# Patient Record
Sex: FEMALE | Employment: FULL TIME | URBAN - METROPOLITAN AREA
[De-identification: names, ages, dates, MRNs, and addresses within clinical notes are randomized per-mention and may not be internally consistent; named-entity substitution may affect disease eponyms.]

---

## 2017-02-28 ENCOUNTER — HOSPITAL ENCOUNTER (OUTPATIENT)
Dept: MAMMOGRAPHY | Age: 57
Discharge: HOME | End: 2017-02-28
Attending: FAMILY MEDICINE
Payer: COMMERCIAL

## 2017-02-28 DIAGNOSIS — Z91.89 AT RISK FOR OSTEOPOROSIS: ICD-10-CM

## 2017-02-28 PROCEDURE — 77080 DXA BONE DENSITY AXIAL: CPT

## 2017-03-08 ENCOUNTER — HOSPITAL ENCOUNTER (EMERGENCY)
Age: 57
Discharge: HOME | End: 2017-03-09
Attending: EMERGENCY MEDICINE
Payer: COMMERCIAL

## 2017-03-08 ENCOUNTER — APPOINTMENT (OUTPATIENT)
Dept: CT IMAGING | Age: 57
End: 2017-03-08
Attending: EMERGENCY MEDICINE
Payer: COMMERCIAL

## 2017-03-08 DIAGNOSIS — R51.9 HEADACHE, UNSPECIFIED HEADACHE TYPE: Primary | ICD-10-CM

## 2017-03-08 LAB
ALBUMIN SERPL BCP-MCNC: 3.6 G/DL (ref 3.5–5)
ALBUMIN/GLOB SERPL: 1 {RATIO} (ref 1.1–2.2)
ALP SERPL-CCNC: 63 U/L (ref 45–117)
ALT SERPL-CCNC: 29 U/L (ref 12–78)
ANION GAP BLD CALC-SCNC: 8 MMOL/L (ref 5–15)
AST SERPL W P-5'-P-CCNC: 19 U/L (ref 15–37)
ATRIAL RATE: 61 BPM
BASOPHILS # BLD AUTO: 0 K/UL (ref 0–0.1)
BASOPHILS # BLD: 1 % (ref 0–1)
BILIRUB SERPL-MCNC: 0.4 MG/DL (ref 0.2–1)
BUN SERPL-MCNC: 13 MG/DL (ref 6–20)
BUN/CREAT SERPL: 19 (ref 12–20)
CALCIUM SERPL-MCNC: 8.3 MG/DL (ref 8.5–10.1)
CALCULATED P AXIS, ECG09: 31 DEGREES
CALCULATED R AXIS, ECG10: 14 DEGREES
CALCULATED T AXIS, ECG11: 31 DEGREES
CHLORIDE SERPL-SCNC: 106 MMOL/L (ref 97–108)
CK SERPL-CCNC: 103 U/L (ref 26–192)
CO2 SERPL-SCNC: 27 MMOL/L (ref 21–32)
CREAT SERPL-MCNC: 0.7 MG/DL (ref 0.55–1.02)
DIAGNOSIS, 93000: NORMAL
EOSINOPHIL # BLD: 0 K/UL (ref 0–0.4)
EOSINOPHIL NFR BLD: 0 % (ref 0–7)
ERYTHROCYTE [DISTWIDTH] IN BLOOD BY AUTOMATED COUNT: 14 % (ref 11.5–14.5)
GLOBULIN SER CALC-MCNC: 3.6 G/DL (ref 2–4)
GLUCOSE SERPL-MCNC: 78 MG/DL (ref 65–100)
HCT VFR BLD AUTO: 36.3 % (ref 35–47)
HGB BLD-MCNC: 12.1 G/DL (ref 11.5–16)
LYMPHOCYTES # BLD AUTO: 24 % (ref 12–49)
LYMPHOCYTES # BLD: 0.9 K/UL (ref 0.8–3.5)
MCH RBC QN AUTO: 28.6 PG (ref 26–34)
MCHC RBC AUTO-ENTMCNC: 33.3 G/DL (ref 30–36.5)
MCV RBC AUTO: 85.8 FL (ref 80–99)
MONOCYTES # BLD: 0.8 K/UL (ref 0–1)
MONOCYTES NFR BLD AUTO: 20 % (ref 5–13)
NEUTS SEG # BLD: 2.2 K/UL (ref 1.8–8)
NEUTS SEG NFR BLD AUTO: 55 % (ref 32–75)
P-R INTERVAL, ECG05: 138 MS
PLATELET # BLD AUTO: 233 K/UL (ref 150–400)
POTASSIUM SERPL-SCNC: 4.6 MMOL/L (ref 3.5–5.1)
PROT SERPL-MCNC: 7.2 G/DL (ref 6.4–8.2)
Q-T INTERVAL, ECG07: 418 MS
QRS DURATION, ECG06: 80 MS
QTC CALCULATION (BEZET), ECG08: 420 MS
RBC # BLD AUTO: 4.23 M/UL (ref 3.8–5.2)
SODIUM SERPL-SCNC: 141 MMOL/L (ref 136–145)
TROPONIN I SERPL-MCNC: <0.04 NG/ML
VENTRICULAR RATE, ECG03: 61 BPM
WBC # BLD AUTO: 4 K/UL (ref 3.6–11)

## 2017-03-08 PROCEDURE — 77030003666 HC NDL SPINAL BD -A

## 2017-03-08 PROCEDURE — 84157 ASSAY OF PROTEIN OTHER: CPT | Performed by: EMERGENCY MEDICINE

## 2017-03-08 PROCEDURE — 75810000133 HC LUMBAR PUNCTURE

## 2017-03-08 PROCEDURE — 87205 SMEAR GRAM STAIN: CPT | Performed by: EMERGENCY MEDICINE

## 2017-03-08 PROCEDURE — 80053 COMPREHEN METABOLIC PANEL: CPT | Performed by: EMERGENCY MEDICINE

## 2017-03-08 PROCEDURE — 96361 HYDRATE IV INFUSION ADD-ON: CPT

## 2017-03-08 PROCEDURE — 87186 SC STD MICRODIL/AGAR DIL: CPT | Performed by: EMERGENCY MEDICINE

## 2017-03-08 PROCEDURE — 82945 GLUCOSE OTHER FLUID: CPT | Performed by: EMERGENCY MEDICINE

## 2017-03-08 PROCEDURE — 74011250637 HC RX REV CODE- 250/637: Performed by: EMERGENCY MEDICINE

## 2017-03-08 PROCEDURE — 70450 CT HEAD/BRAIN W/O DYE: CPT

## 2017-03-08 PROCEDURE — 82550 ASSAY OF CK (CPK): CPT | Performed by: EMERGENCY MEDICINE

## 2017-03-08 PROCEDURE — 87077 CULTURE AEROBIC IDENTIFY: CPT | Performed by: EMERGENCY MEDICINE

## 2017-03-08 PROCEDURE — 84484 ASSAY OF TROPONIN QUANT: CPT | Performed by: EMERGENCY MEDICINE

## 2017-03-08 PROCEDURE — 77030014143 HC TY PUNC LUMBR BD -A

## 2017-03-08 PROCEDURE — 93005 ELECTROCARDIOGRAM TRACING: CPT

## 2017-03-08 PROCEDURE — 36415 COLL VENOUS BLD VENIPUNCTURE: CPT | Performed by: EMERGENCY MEDICINE

## 2017-03-08 PROCEDURE — 70496 CT ANGIOGRAPHY HEAD: CPT

## 2017-03-08 PROCEDURE — 99285 EMERGENCY DEPT VISIT HI MDM: CPT

## 2017-03-08 PROCEDURE — 74011636320 HC RX REV CODE- 636/320: Performed by: EMERGENCY MEDICINE

## 2017-03-08 PROCEDURE — 96374 THER/PROPH/DIAG INJ IV PUSH: CPT

## 2017-03-08 PROCEDURE — 74011250636 HC RX REV CODE- 250/636: Performed by: EMERGENCY MEDICINE

## 2017-03-08 PROCEDURE — 96375 TX/PRO/DX INJ NEW DRUG ADDON: CPT

## 2017-03-08 PROCEDURE — 89050 BODY FLUID CELL COUNT: CPT | Performed by: EMERGENCY MEDICINE

## 2017-03-08 PROCEDURE — 85025 COMPLETE CBC W/AUTO DIFF WBC: CPT | Performed by: EMERGENCY MEDICINE

## 2017-03-08 RX ORDER — BUTALBITAL, ACETAMINOPHEN AND CAFFEINE 300; 40; 50 MG/1; MG/1; MG/1
1 CAPSULE ORAL
Qty: 20 CAP | Refills: 0 | Status: SHIPPED | OUTPATIENT
Start: 2017-03-08 | End: 2017-03-13

## 2017-03-08 RX ORDER — TRAMADOL HYDROCHLORIDE 50 MG/1
50 TABLET ORAL
Qty: 20 TAB | Refills: 0 | Status: ON HOLD | OUTPATIENT
Start: 2017-03-08 | End: 2017-03-12

## 2017-03-08 RX ORDER — KETOROLAC TROMETHAMINE 30 MG/ML
15 INJECTION, SOLUTION INTRAMUSCULAR; INTRAVENOUS
Status: COMPLETED | OUTPATIENT
Start: 2017-03-08 | End: 2017-03-08

## 2017-03-08 RX ORDER — SODIUM CHLORIDE 0.9 % (FLUSH) 0.9 %
10 SYRINGE (ML) INJECTION
Status: COMPLETED | OUTPATIENT
Start: 2017-03-08 | End: 2017-03-08

## 2017-03-08 RX ORDER — MORPHINE SULFATE 2 MG/ML
4 INJECTION, SOLUTION INTRAMUSCULAR; INTRAVENOUS
Status: COMPLETED | OUTPATIENT
Start: 2017-03-08 | End: 2017-03-08

## 2017-03-08 RX ORDER — ONDANSETRON 2 MG/ML
4 INJECTION INTRAMUSCULAR; INTRAVENOUS
Status: COMPLETED | OUTPATIENT
Start: 2017-03-08 | End: 2017-03-08

## 2017-03-08 RX ORDER — SODIUM CHLORIDE 9 MG/ML
50 INJECTION, SOLUTION INTRAVENOUS
Status: COMPLETED | OUTPATIENT
Start: 2017-03-08 | End: 2017-03-09

## 2017-03-08 RX ORDER — DEXAMETHASONE SODIUM PHOSPHATE 4 MG/ML
10 INJECTION, SOLUTION INTRA-ARTICULAR; INTRALESIONAL; INTRAMUSCULAR; INTRAVENOUS; SOFT TISSUE
Status: COMPLETED | OUTPATIENT
Start: 2017-03-08 | End: 2017-03-08

## 2017-03-08 RX ORDER — BUTALBITAL, ACETAMINOPHEN AND CAFFEINE 50; 325; 40 MG/1; MG/1; MG/1
2 TABLET ORAL
Status: COMPLETED | OUTPATIENT
Start: 2017-03-08 | End: 2017-03-08

## 2017-03-08 RX ADMIN — ONDANSETRON HYDROCHLORIDE 4 MG: 2 INJECTION, SOLUTION INTRAMUSCULAR; INTRAVENOUS at 18:25

## 2017-03-08 RX ADMIN — Medication 10 ML: at 21:55

## 2017-03-08 RX ADMIN — BUTALBITAL, ACETAMINOPHEN AND CAFFEINE 2 TABLET: 50; 325; 40 TABLET ORAL at 20:41

## 2017-03-08 RX ADMIN — KETOROLAC TROMETHAMINE 15 MG: 30 INJECTION, SOLUTION INTRAMUSCULAR at 18:26

## 2017-03-08 RX ADMIN — Medication 4 MG: at 21:27

## 2017-03-08 RX ADMIN — SODIUM CHLORIDE 1000 ML: 900 INJECTION, SOLUTION INTRAVENOUS at 18:23

## 2017-03-08 RX ADMIN — DEXAMETHASONE SODIUM PHOSPHATE 10 MG: 4 INJECTION, SOLUTION INTRAMUSCULAR; INTRAVENOUS at 18:28

## 2017-03-08 RX ADMIN — IOPAMIDOL 100 ML: 755 INJECTION, SOLUTION INTRAVENOUS at 21:55

## 2017-03-08 RX ADMIN — SODIUM CHLORIDE 50 ML/HR: 900 INJECTION, SOLUTION INTRAVENOUS at 21:55

## 2017-03-08 NOTE — ED NOTES
States she became dizzy yesterday after leaving work. Saw MD at place of work today and was referred to ED for further eval.  States that when she turns her head to the left she becomes very dizzy and when she turns her head to the right she becomes nauseated. Also complaining of bilateral temporal headache that is extending down into her jaw.

## 2017-03-08 NOTE — ED PROVIDER NOTES
HPI Comments: Savannah Sarmiento is a 49 y.o. female with PMHx of hypothyroid disease, presenting ambulatory to ED c/o intermittent dizziness for the past two days. Pt reports dizziness is worse with ambulation. She notes associated constant 7/10 throbbing diffuse HA, worse since last night. Pt states the HA is worse with moving her neck from side to side, unrelieved with taking Advil this morning. She also c/o diffuse neck pain, nausea, and photophobia secondary to HA. Pt notes she can hear her heartbeat. She reports evaluation for symptoms yesterday by a [facility] physician during which she had positive orthostatic test; pt notes she had other tests completed but does not provide specifics. Pt denies history of migraines and HA. She denies other PMHx. Pt specifically denies fever, CP, SOB, vomiting, vision changes, and numbness/tingling anywhere. PCP: Demetrius Rubin MD  Social Hx: never smoker; + EtOH (socially); There are no other complaints, changes, or physical findings at this time. Written by DEBRA Saenz, as dictated by Cisco Lee DO. The history is provided by the patient. Past Medical History:   Diagnosis Date    Thyroid disease     hypothyroid       Past Surgical History:   Procedure Laterality Date    HX ORTHOPAEDIC  2002    ACL repair, pt unsure which side    HX ORTHOPAEDIC  2013    left shoulder surgery    TX COLONOSCOPY FLX DX W/COLLJ SPEC WHEN PFRMD  5/6/2013              No family history on file. Social History     Social History    Marital status:      Spouse name: N/A    Number of children: N/A    Years of education: N/A     Occupational History    Not on file.      Social History Main Topics    Smoking status: Never Smoker    Smokeless tobacco: Not on file    Alcohol use Yes      Comment: socially    Drug use: Not on file    Sexual activity: Not on file     Other Topics Concern    Not on file     Social History Narrative ALLERGIES: Percocet [oxycodone-acetaminophen]    Review of Systems   Constitutional: Negative for fatigue and fever. Eyes: Positive for photophobia. Negative for visual disturbance. Respiratory: Negative for shortness of breath and wheezing. Cardiovascular: Negative for chest pain and leg swelling. Gastrointestinal: Positive for nausea. Negative for blood in stool, constipation, diarrhea and vomiting. Endocrine: Negative. Genitourinary: Negative for difficulty urinating and dysuria. Musculoskeletal: Positive for neck pain (diffuse). Skin: Negative for rash. Allergic/Immunologic: Negative. Neurological: Positive for dizziness and headaches (diffuse). Negative for weakness and numbness (or tingling). Hematological: Negative. Psychiatric/Behavioral: Negative. All other systems reviewed and are negative. Vitals:    03/08/17 1900 03/08/17 2042 03/08/17 2044 03/08/17 2046   BP: 154/79 149/68 143/85 137/82   Pulse:  79 81 83   Resp:       Temp:       SpO2: 97%      Weight:       Height:                Physical Exam   Constitutional: She is oriented to person, place, and time. She appears well-developed and well-nourished. HENT:   Head: Normocephalic and atraumatic. Mouth/Throat: Mucous membranes are normal.   Eyes: EOM are normal. Pupils are equal, round, and reactive to light. Neck: Normal range of motion. No JVD present. No tracheal deviation present. No neck stiffness, neck is supple;   Cardiovascular: Normal rate, regular rhythm, normal heart sounds and intact distal pulses. Exam reveals no gallop and no friction rub. No murmur heard. Pulmonary/Chest: Effort normal and breath sounds normal. No stridor. She has no wheezes. She has no rales. Abdominal: Soft. Bowel sounds are normal. She exhibits no distension and no mass. There is no tenderness. There is no guarding. Musculoskeletal: Normal range of motion. She exhibits no edema or tenderness.    Neurological: She is alert and oriented to person, place, and time. Skin: Skin is warm and dry. No rash noted. Psychiatric: She has a normal mood and affect. Her behavior is normal. Judgment and thought content normal.   Nursing note and vitals reviewed. MDM  Number of Diagnoses or Management Options  Diagnosis management comments: Pt presenting with headache and dizziness with walking. Pt was seen by her PCP today and was sent here for further evaluation. Pt ambulatory, with no focal deficits on exam. Pt has full ROM of her neck, no fevers, and no nuchal rigidity. Low suspicion for meningitis. DDx includes migraine headache, tension headache, cluster headache, dehydration, orthostatic hypotension, electrolyte abnormality. BP is WNL. Will treat symptomatically, check labs, orthostatics, then reassess. Amount and/or Complexity of Data Reviewed  Clinical lab tests: ordered and reviewed  Tests in the medicine section of CPT®: ordered and reviewed  Review and summarize past medical records: yes  Discuss the patient with other providers: yes (Neurosurgery)  Independent visualization of images, tracings, or specimens: yes    Patient Progress  Patient progress: stable    Procedures    EKG interpretation: (1431)  Rhythm: normal sinus rhythm; and regular . Rate (approx.): 61; Axis: normal; IA interval: normal; QRS interval: normal ; ST/T wave: normal; Other findings: normal.  Written by DEBRA Archibald, as dictated by Hever Paiz DD. Progress Note:  10:01 PM  Updated patient on lab and imaging results. Pt states she is feeling a little better but still having a significant headache. Pt had just received fioricet and morphine has been ordered. Discussed with patient that I had a low suspicion for meningitis given her non-toxic appearance and lack of nuchal rigidity. Discussed CTA to r/o vertebral artery dissection and patient was agreeable.  If imaging negative, will dc home with analgesia, pcp and neuro follow up. Pt was agreeable with plan. CONSULT NOTE:   10:32 PM  Jordon Calderon DO spoke with Dr. Saranya Biswas,   Specialty: Neurosurgery  Discussed pt's hx, disposition, and available diagnostic and imaging results. Reviewed care plans. Consultant agrees with plans as outlined. Dr. Saranya Biswas states pt's head CT findings are not causing her symptoms. Written by Marcin Mann ED Scribe, as dictated by Jordon Calderon DO. Procedure Note - Lumbar Puncture:   10:50 PM  Performed by Jordon Calderon DO. Immediately prior to the procedure, the patient was reevaluated and found suitable for the planned procedure and any planned medications. Immediately prior to the procedure a time out was called to verify the correct patient, procedure, equipment, staff, and marking as appropriate. The site prepped with Betadine. Anesthesia was obtained with 4 mLs 1% lidocaine. Patient position: sitting  Intervertebral space: L3-L4  A 20 gauge spinal needle was introduced with 4 attempts. CSF was collected, clear in appearance, and sent for analysis. Sterile dressing applied. Estimated blood loss: none  The procedure took 16-30 minutes, and pt tolerated well. Written by Marcin Mann ED Scribe, as dictated by Jordon Calderon DO. SIGN OUT:  11:51 PM  Patient's presentation, labs/imaging and plan of care was reviewed with Darion Chester MD as part of sign out. They will wait for the pt's LP results as part of the plan discussed with the patient. Darion Chester MD's assistance in completion of this plan is greatly appreciated but it should be noted that I will be the provider of record for this patient. Jordon Calderon DO     This note is prepared by Marcin Mann, acting as Scribe for Jordon Calderon DO. Jordon Calderon DO: The scribe's documentation has been prepared under my direction and personally reviewed by me in its entirety.  I confirm that the note above accurately reflects all work, treatment, procedures, and medical decision making performed by me. Progress Note:  1:15 AM  Pt reports feeling better without being given pain medication. Pt was sleeping and her heart rate was in mid 40s and after she woke up heart rate increased to mid 60s. Written by Rl Young ED Scribreuben, as dictated by Agatha Zaman MD.      LABORATORY TESTS:  Recent Results (from the past 12 hour(s))   EKG, 12 LEAD, INITIAL    Collection Time: 03/08/17  2:31 PM   Result Value Ref Range    Ventricular Rate 61 BPM    Atrial Rate 61 BPM    P-R Interval 138 ms    QRS Duration 80 ms    Q-T Interval 418 ms    QTC Calculation (Bezet) 420 ms    Calculated P Axis 31 degrees    Calculated R Axis 14 degrees    Calculated T Axis 31 degrees    Diagnosis       Normal sinus rhythm  No previous ECGs available  Confirmed by Morena Ramsey ([id number]) on 3/8/2017 9:48:53 PM     CBC WITH AUTOMATED DIFF    Collection Time: 03/08/17  3:12 PM   Result Value Ref Range    WBC 4.0 3.6 - 11.0 K/uL    RBC 4.23 3.80 - 5.20 M/uL    HGB 12.1 11.5 - 16.0 g/dL    HCT 36.3 35.0 - 47.0 %    MCV 85.8 80.0 - 99.0 FL    MCH 28.6 26.0 - 34.0 PG    MCHC 33.3 30.0 - 36.5 g/dL    RDW 14.0 11.5 - 14.5 %    PLATELET 704 856 - 042 K/uL    NEUTROPHILS 55 32 - 75 %    LYMPHOCYTES 24 12 - 49 %    MONOCYTES 20 (H) 5 - 13 %    EOSINOPHILS 0 0 - 7 %    BASOPHILS 1 0 - 1 %    ABS. NEUTROPHILS 2.2 1.8 - 8.0 K/UL    ABS. LYMPHOCYTES 0.9 0.8 - 3.5 K/UL    ABS. MONOCYTES 0.8 0.0 - 1.0 K/UL    ABS. EOSINOPHILS 0.0 0.0 - 0.4 K/UL    ABS.  BASOPHILS 0.0 0.0 - 0.1 K/UL   METABOLIC PANEL, COMPREHENSIVE    Collection Time: 03/08/17  3:12 PM   Result Value Ref Range    Sodium 141 136 - 145 mmol/L    Potassium 4.6 3.5 - 5.1 mmol/L    Chloride 106 97 - 108 mmol/L    CO2 27 21 - 32 mmol/L    Anion gap 8 5 - 15 mmol/L    Glucose 78 65 - 100 mg/dL    BUN 13 6 - 20 MG/DL    Creatinine 0.70 0.55 - 1.02 MG/DL    BUN/Creatinine ratio 19 12 - 20      GFR est AA >60 >60 ml/min/1.73m2    GFR est non-AA >60 >60 ml/min/1.73m2    Calcium 8.3 (L) 8.5 - 10.1 MG/DL    Bilirubin, total 0.4 0.2 - 1.0 MG/DL    ALT (SGPT) 29 12 - 78 U/L    AST (SGOT) 19 15 - 37 U/L    Alk. phosphatase 63 45 - 117 U/L    Protein, total 7.2 6.4 - 8.2 g/dL    Albumin 3.6 3.5 - 5.0 g/dL    Globulin 3.6 2.0 - 4.0 g/dL    A-G Ratio 1.0 (L) 1.1 - 2.2     CK W/ REFLX CKMB    Collection Time: 03/08/17  3:12 PM   Result Value Ref Range     26 - 192 U/L   TROPONIN I    Collection Time: 03/08/17  3:12 PM   Result Value Ref Range    Troponin-I, Qt. <0.04 <0.05 ng/mL   CELL COUNT, CSF    Collection Time: 03/08/17 11:19 PM   Result Value Ref Range    CSF TUBE NO. 1      CSF COLOR PINK      SPUN COLOR PINK      CSF APPEARANCE CLEAR      CSF RBCS 2970 (H) 0 /cu mm    CSF WBCS 0 0 - 5 /cu mm   GLUCOSE, CSF    Collection Time: 03/08/17 11:19 PM   Result Value Ref Range    Tube No. 3      Glucose,CSF 55 40 - 70 MG/DL   PROTEIN, CSF    Collection Time: 03/08/17 11:19 PM   Result Value Ref Range    Tube No. 3      Protein,CSF 55 (H) 15 - 45 MG/DL   CELL COUNT, CSF    Collection Time: 03/08/17 11:20 PM   Result Value Ref Range    CSF TUBE NO. 3      CSF COLOR COLORLESS      CSF APPEARANCE CLEAR      CSF RBCS 122 (H) 0 /cu mm    CSF WBCS 3 0 - 5 /cu mm   CULTURE, CSF W GRAM STAIN    Collection Time: 03/08/17 11:21 PM   Result Value Ref Range    Special Requests: NO SPECIAL REQUESTS      GRAM STAIN NO WBC'S SEEN      GRAM STAIN NO ORGANISMS SEEN      Culture result: PENDING        IMAGING RESULTS:  EXAM: CT HEAD WO CONT     INDICATION: dizziness, headache     COMPARISON: None.     TECHNIQUE: Unenhanced CT of the head was performed using 5 mm images. Brain and  bone windows were generated.  CT dose reduction was achieved through use of a  standardized protocol tailored for this examination and automatic exposure  control for dose modulation.      FINDINGS:  There Is no extra-axial fluid collection, hemorrhage shift or masses .     IMPRESSION  impression: Negative. 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 There is no vascular occlusion or significant  flow-limiting stenosis in the head or neck.     Preliminary report was provided by Dr. Rosey Kyle, the on-call radiologist, on  3/8/2017 at 2205 hours.     Final report to follow.     **END PRELIMINARY REPORT**      MEDICATIONS GIVEN:  Medications   sodium chloride 0.9 % bolus infusion 1,000 mL (1,000 mL IntraVENous New Bag 3/9/17 0031)   sodium chloride 0.9 % bolus infusion 1,000 mL (0 mL IntraVENous IV Completed 3/8/17 2042)   ondansetron (ZOFRAN) injection 4 mg (4 mg IntraVENous Given 3/8/17 1825)   dexamethasone (DECADRON) 4 mg/mL injection 10 mg (10 mg IntraVENous Given 3/8/17 1828)   ketorolac (TORADOL) injection 15 mg (15 mg IntraVENous Given 3/8/17 1826)   butalbital-acetaminophen-caffeine (FIORICET, ESGIC) -40 mg per tablet 2 Tab (2 Tabs Oral Given 3/8/17 2041)   morphine injection 4 mg (4 mg IntraVENous Given 3/8/17 2127)   sodium chloride (NS) flush 10 mL (10 mL IntraVENous Given 3/8/17 2155)   iopamidol (ISOVUE-370) 76 % injection 100 mL (100 mL IntraVENous Given 3/8/17 2155)   0.9% sodium chloride infusion (50 mL/hr IntraVENous New Bag 3/8/17 2155)       IMPRESSION:  1. Headache, unspecified headache type        PLAN:  1. Current Discharge Medication List      START taking these medications    Details   butalbital-acetaminophen-caff (FIORICET) -40 mg per capsule Take 1 Cap by mouth every four (4) hours as needed for Pain for up to 4 days. Max Daily Amount: 6 Caps. Qty: 20 Cap, Refills: 0      traMADol (ULTRAM) 50 mg tablet Take 1 Tab by mouth every six (6) hours as needed for Pain for up to 10 days. Max Daily Amount: 200 mg.   Qty: 20 Tab, Refills: 0         CONTINUE these medications which have NOT CHANGED    Details   HYDROmorphone (DILAUDID) 2 mg tablet Take 2 Tabs by mouth every four (4) hours as needed for Pain. Qty: 50 Tab, Refills: 0      levothyroxine (SYNTHROID) 25 mcg tablet Take 25 mcg by mouth Daily (before breakfast). 2.   Follow-up Information     Follow up With Details Comments Contact Info    Balta Contreras NP Schedule an appointment as soon as possible for a visit in 1 day      Rodríguez Sena MD Schedule an appointment as soon as possible for a visit  [id number] [facility]  [phone]      [facility] EMERGENCY DEPT  As needed, If symptoms worsen [address]  [address] [facility]  [phone]        Return to ED if worse     DISCHARGE NOTE  1:18 AM  The patient has been re-evaluated and is ready for discharge. Reviewed available results with patient. Counseled pt on diagnosis and care plan. Pt has expressed understanding, and all questions have been answered. Pt agrees with plan and agrees to F/U as recommended, or return to the ED if their sxs worsen. Discharge instructions have been provided and explained to the pt, along with reasons to return to the ED. Written by Rl Young, ED Scribe, as dictated by Agatha Zaman MD.      This note is prepared by Rl Young, acting as Scribe for Agatha Zaman MD.    Agatha Zaman MD : The scribe's documentation has been prepared under my direction and personally reviewed by me in its entirety. I confirm that the note above accurately reflects all work, treatment, procedures, and medical decision making performed by me.

## 2017-03-09 VITALS
HEART RATE: 62 BPM | WEIGHT: 126.13 LBS | HEIGHT: 59 IN | OXYGEN SATURATION: 95 % | BODY MASS INDEX: 25.43 KG/M2 | SYSTOLIC BLOOD PRESSURE: 105 MMHG | RESPIRATION RATE: 18 BRPM | DIASTOLIC BLOOD PRESSURE: 57 MMHG | TEMPERATURE: 98.5 F

## 2017-03-09 VITALS
TEMPERATURE: 98.4 F | HEIGHT: 59 IN | OXYGEN SATURATION: 97 % | RESPIRATION RATE: 18 BRPM | SYSTOLIC BLOOD PRESSURE: 112 MMHG | WEIGHT: 124.34 LBS | DIASTOLIC BLOOD PRESSURE: 56 MMHG | BODY MASS INDEX: 25.07 KG/M2 | HEART RATE: 83 BPM

## 2017-03-09 DIAGNOSIS — R51.9 HEADACHE, UNSPECIFIED HEADACHE TYPE: Primary | ICD-10-CM

## 2017-03-09 LAB
ALBUMIN SERPL BCP-MCNC: 3.2 G/DL (ref 3.5–5)
ALBUMIN/GLOB SERPL: 1 {RATIO} (ref 1.1–2.2)
ALP SERPL-CCNC: 58 U/L (ref 45–117)
ALT SERPL-CCNC: 27 U/L (ref 12–78)
ANION GAP BLD CALC-SCNC: 5 MMOL/L (ref 5–15)
AST SERPL W P-5'-P-CCNC: 16 U/L (ref 15–37)
BASOPHILS # BLD AUTO: 0 K/UL (ref 0–0.1)
BASOPHILS # BLD: 0 % (ref 0–1)
BILIRUB SERPL-MCNC: 0.2 MG/DL (ref 0.2–1)
BUN SERPL-MCNC: 12 MG/DL (ref 6–20)
BUN/CREAT SERPL: 16 (ref 12–20)
CALCIUM SERPL-MCNC: 8.2 MG/DL (ref 8.5–10.1)
CHARACTER SMN: CLEAR
CHARACTER SMN: CLEAR
CHLORIDE SERPL-SCNC: 104 MMOL/L (ref 97–108)
CO2 SERPL-SCNC: 29 MMOL/L (ref 21–32)
COLOR SPUN CSF: ABNORMAL
COLOR SPUN CSF: ABNORMAL
COLOR SPUN CSF: COLORLESS
CREAT SERPL-MCNC: 0.77 MG/DL (ref 0.55–1.02)
EOSINOPHIL # BLD: 0 K/UL (ref 0–0.4)
EOSINOPHIL NFR BLD: 0 % (ref 0–7)
ERYTHROCYTE [DISTWIDTH] IN BLOOD BY AUTOMATED COUNT: 14.5 % (ref 11.5–14.5)
GLOBULIN SER CALC-MCNC: 3.3 G/DL (ref 2–4)
GLUCOSE CSF-MCNC: 55 MG/DL (ref 40–70)
GLUCOSE SERPL-MCNC: 86 MG/DL (ref 65–100)
HCT VFR BLD AUTO: 35.7 % (ref 35–47)
HGB BLD-MCNC: 11.3 G/DL (ref 11.5–16)
LYMPHOCYTES # BLD AUTO: 12 % (ref 12–49)
LYMPHOCYTES # BLD: 1.1 K/UL (ref 0.8–3.5)
MCH RBC QN AUTO: 27.4 PG (ref 26–34)
MCHC RBC AUTO-ENTMCNC: 31.7 G/DL (ref 30–36.5)
MCV RBC AUTO: 86.7 FL (ref 80–99)
MONOCYTES # BLD: 0.9 K/UL (ref 0–1)
MONOCYTES NFR BLD AUTO: 10 % (ref 5–13)
NEUTS SEG # BLD: 6.7 K/UL (ref 1.8–8)
NEUTS SEG NFR BLD AUTO: 78 % (ref 32–75)
PLATELET # BLD AUTO: 260 K/UL (ref 150–400)
POTASSIUM SERPL-SCNC: 3.9 MMOL/L (ref 3.5–5.1)
PROT CSF-MCNC: 55 MG/DL (ref 15–45)
PROT SERPL-MCNC: 6.5 G/DL (ref 6.4–8.2)
RBC # BLD AUTO: 4.12 M/UL (ref 3.8–5.2)
RBC # CSF: 122 /CU MM
RBC # CSF: 2970 /CU MM
SODIUM SERPL-SCNC: 138 MMOL/L (ref 136–145)
TUBE # CSF: 1
TUBE # CSF: 3
WBC # BLD AUTO: 8.7 K/UL (ref 3.6–11)
WBC # CSF: 0 /CU MM (ref 0–5)
WBC # CSF: 3 /CU MM (ref 0–5)

## 2017-03-09 PROCEDURE — 85025 COMPLETE CBC W/AUTO DIFF WBC: CPT | Performed by: EMERGENCY MEDICINE

## 2017-03-09 PROCEDURE — 74011250636 HC RX REV CODE- 250/636: Performed by: EMERGENCY MEDICINE

## 2017-03-09 PROCEDURE — 96374 THER/PROPH/DIAG INJ IV PUSH: CPT

## 2017-03-09 PROCEDURE — 96375 TX/PRO/DX INJ NEW DRUG ADDON: CPT

## 2017-03-09 PROCEDURE — 80053 COMPREHEN METABOLIC PANEL: CPT | Performed by: EMERGENCY MEDICINE

## 2017-03-09 PROCEDURE — 96361 HYDRATE IV INFUSION ADD-ON: CPT

## 2017-03-09 PROCEDURE — 99285 EMERGENCY DEPT VISIT HI MDM: CPT

## 2017-03-09 RX ORDER — MORPHINE SULFATE 4 MG/ML
4 INJECTION, SOLUTION INTRAMUSCULAR; INTRAVENOUS
Status: COMPLETED | OUTPATIENT
Start: 2017-03-09 | End: 2017-03-09

## 2017-03-09 RX ORDER — METOCLOPRAMIDE HYDROCHLORIDE 5 MG/ML
10 INJECTION INTRAMUSCULAR; INTRAVENOUS
Status: COMPLETED | OUTPATIENT
Start: 2017-03-09 | End: 2017-03-09

## 2017-03-09 RX ORDER — DEXAMETHASONE SODIUM PHOSPHATE 4 MG/ML
10 INJECTION, SOLUTION INTRA-ARTICULAR; INTRALESIONAL; INTRAMUSCULAR; INTRAVENOUS; SOFT TISSUE
Status: COMPLETED | OUTPATIENT
Start: 2017-03-09 | End: 2017-03-09

## 2017-03-09 RX ORDER — ONDANSETRON 2 MG/ML
8 INJECTION INTRAMUSCULAR; INTRAVENOUS
Status: COMPLETED | OUTPATIENT
Start: 2017-03-09 | End: 2017-03-09

## 2017-03-09 RX ORDER — KETOROLAC TROMETHAMINE 30 MG/ML
30 INJECTION, SOLUTION INTRAMUSCULAR; INTRAVENOUS
Status: COMPLETED | OUTPATIENT
Start: 2017-03-09 | End: 2017-03-09

## 2017-03-09 RX ADMIN — DEXAMETHASONE SODIUM PHOSPHATE 10 MG: 4 INJECTION, SOLUTION INTRAMUSCULAR; INTRAVENOUS at 16:20

## 2017-03-09 RX ADMIN — Medication 4 MG: at 16:26

## 2017-03-09 RX ADMIN — ONDANSETRON 8 MG: 2 INJECTION INTRAMUSCULAR; INTRAVENOUS at 16:17

## 2017-03-09 RX ADMIN — SODIUM CHLORIDE 1000 ML: 900 INJECTION, SOLUTION INTRAVENOUS at 00:31

## 2017-03-09 RX ADMIN — SODIUM CHLORIDE 1000 ML: 900 INJECTION, SOLUTION INTRAVENOUS at 16:12

## 2017-03-09 RX ADMIN — METOCLOPRAMIDE 10 MG: 5 INJECTION, SOLUTION INTRAMUSCULAR; INTRAVENOUS at 16:23

## 2017-03-09 RX ADMIN — SODIUM CHLORIDE 1000 ML: 900 INJECTION, SOLUTION INTRAVENOUS at 16:26

## 2017-03-09 RX ADMIN — KETOROLAC TROMETHAMINE 30 MG: 30 INJECTION, SOLUTION INTRAMUSCULAR at 16:19

## 2017-03-09 NOTE — ED NOTES
Dr. Rich Ho gave and reviewed discharge instructions with the patient. The patient verbalized understanding. The patient was given opportunity for questions. Patient discharged in stable condition to the waiting room via wheelchair with RN and male visitor.

## 2017-03-09 NOTE — ED TRIAGE NOTES
Pt stated she was seen at [facility] yesterday for a headache, pt stated she felt better after morphine, pt stated her headache is still there, pt stated she had a CT scan and a lumbar puncture, pt stated she is having problems with her vision and she woke up with her right arm being numb , denies fever, +nausea

## 2017-03-09 NOTE — ED NOTES
Discharged home with written instructions and no questions. Ambulating out of department, gait steady.

## 2017-03-09 NOTE — ED NOTES
Bedside and Verbal shift change report given to [address] [place] [address] Street (oncoming nurse) by Skip Vogel (offgoing nurse).  Report included the following information SBAR, ED Summary, Intake/Output, MAR, Recent Results and Cardiac Rhythm SR.

## 2017-03-09 NOTE — ED NOTES
Assumed care of pt from [facility], RN at bedside with verbal report consisting of Situation, Background, Assessment, and Recommendations (SBAR). Pt is A&O x 4. Pt resting comfortably on the stretcher in a position of comfort. Call bell within reach. Side rails x 2. Cardiac monitor x 2. Stretcher locked in the lowest position. Concerns and questions addressed at this time. Pt in no acute distress at this the time. Will continue to monitor.

## 2017-03-09 NOTE — PROGRESS NOTES
Spoke with patient to refer to [facility] neur[facility]adiology, [phone]. Discussed CT results with patient. Patient states she is feeling worse today and at 1 point could not read the numbers on her insurance card this morning. Provider strongly advised patient multiple times to come back to be evaluated. Patient states she did not want to wait to be seen again. Provider again strongly advised she come back and be evaluated or she may even go to another ER as long as she is re-evaluated for her symptoms.    Cathi Wagoner, [address]

## 2017-03-09 NOTE — ED NOTES
Hourly rounds completed. Concerns and questions addressed at this time. Pt in no acute distress at this time. Call bell within reach. Side rails x 2. Cardiac Monitor x 2. Stretcher locked in lowest position. Consent obtained for Lumbar puncture.

## 2017-03-09 NOTE — ED NOTES
Hourly rounds completed. Concerns and questions addressed at this time. Pt in no acute distress at this time. Call bell within reach. Side rails x 2. Cardiac Monitor x 2. Stretcher locked in lowest position.

## 2017-03-09 NOTE — Clinical Note
Home to continue your current medications and make an appointment to follow up with Dr. Monisha Naik or someone in the office as soon as you can in follow up. Return for severe pain, fever or vomiting.

## 2017-03-09 NOTE — DISCHARGE INSTRUCTIONS

## 2017-03-10 ENCOUNTER — TELEPHONE (OUTPATIENT)
Dept: NEUROSURGERY | Age: 57
End: 2017-03-10

## 2017-03-10 NOTE — TELEPHONE ENCOUNTER
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 Pt had been seen earlier for acute HA, dizziness photophobia, and nausea. Pt had taken OTC without significant relief. No prior hx of similar complaints. She had been discharged with instructions to call and make appt. Per her subjective history and imaging report I determined that a clinic visit would be beneficial, does not have to be urgent. Her sx are unrelated to aneurysm. Emotional reassurance was given to patient that caverous aneurysms do not rupture or cause complications, nor do they cause the current sx she is experiencing. She made noted she was also having some numbness now. I requested that she may need to follow up with the ED again. She was hesitant with this advice. I told her we could see her in clinic next week and would show her imaging to her so she could visibly see the aneurysm. She will need to call back to schedule that appt, she agreed. I told her that her sx were more likely due to variant migraine, but was unable to make a definitive diagnosis over the telephone. Addenedum: later that day Dr Opal Crawford called, I reiterated the same information. She had gone to the ED.

## 2017-03-11 ENCOUNTER — APPOINTMENT (OUTPATIENT)
Dept: GENERAL RADIOLOGY | Age: 57
DRG: 103 | End: 2017-03-11
Attending: EMERGENCY MEDICINE
Payer: COMMERCIAL

## 2017-03-11 ENCOUNTER — HOSPITAL ENCOUNTER (INPATIENT)
Age: 57
LOS: 2 days | Discharge: HOME | DRG: 103 | End: 2017-03-13
Attending: EMERGENCY MEDICINE | Admitting: INTERNAL MEDICINE
Payer: COMMERCIAL

## 2017-03-11 DIAGNOSIS — J06.9 ACUTE UPPER RESPIRATORY INFECTION: ICD-10-CM

## 2017-03-11 DIAGNOSIS — F34.1 DYSTHYMIA: ICD-10-CM

## 2017-03-11 DIAGNOSIS — I67.1 RIGHT INTERNAL CAROTID ARTERY ANEURYSM: ICD-10-CM

## 2017-03-11 DIAGNOSIS — R51.9 ACUTE INTRACTABLE HEADACHE, UNSPECIFIED HEADACHE TYPE: Primary | ICD-10-CM

## 2017-03-11 LAB
ALBUMIN SERPL BCP-MCNC: 2.8 G/DL (ref 3.5–5)
ALBUMIN/GLOB SERPL: 0.9 {RATIO} (ref 1.1–2.2)
ALP SERPL-CCNC: 71 U/L (ref 45–117)
ALT SERPL-CCNC: 88 U/L (ref 12–78)
ANION GAP BLD CALC-SCNC: 8 MMOL/L (ref 5–15)
AST SERPL W P-5'-P-CCNC: 56 U/L (ref 15–37)
BASOPHILS # BLD AUTO: 0 K/UL (ref 0–0.1)
BASOPHILS # BLD: 0 % (ref 0–1)
BILIRUB SERPL-MCNC: 0.3 MG/DL (ref 0.2–1)
BUN SERPL-MCNC: 9 MG/DL (ref 6–20)
BUN/CREAT SERPL: 14 (ref 12–20)
CALCIUM SERPL-MCNC: 8 MG/DL (ref 8.5–10.1)
CHARACTER SMN: CLEAR
CHARACTER SMN: CLEAR
CHLORIDE SERPL-SCNC: 104 MMOL/L (ref 97–108)
CO2 SERPL-SCNC: 28 MMOL/L (ref 21–32)
COLOR SPUN CSF: COLORLESS
COLOR SPUN CSF: COLORLESS
CREAT SERPL-MCNC: 0.65 MG/DL (ref 0.55–1.02)
EOSINOPHIL # BLD: 0 K/UL (ref 0–0.4)
EOSINOPHIL NFR BLD: 0 % (ref 0–7)
ERYTHROCYTE [DISTWIDTH] IN BLOOD BY AUTOMATED COUNT: 14 % (ref 11.5–14.5)
GLOBULIN SER CALC-MCNC: 3.1 G/DL (ref 2–4)
GLUCOSE CSF-MCNC: 45 MG/DL (ref 40–70)
GLUCOSE SERPL-MCNC: 80 MG/DL (ref 65–100)
HCT VFR BLD AUTO: 33.9 % (ref 35–47)
HGB BLD-MCNC: 11 G/DL (ref 11.5–16)
LACTATE SERPL-SCNC: 0.7 MMOL/L (ref 0.4–2)
LYMPHOCYTES # BLD AUTO: 16 % (ref 12–49)
LYMPHOCYTES # BLD: 1.2 K/UL (ref 0.8–3.5)
MCH RBC QN AUTO: 27 PG (ref 26–34)
MCHC RBC AUTO-ENTMCNC: 32.4 G/DL (ref 30–36.5)
MCV RBC AUTO: 83.3 FL (ref 80–99)
MONOCYTES # BLD: 0.6 K/UL (ref 0–1)
MONOCYTES NFR BLD AUTO: 8 % (ref 5–13)
NEUTS SEG # BLD: 6 K/UL (ref 1.8–8)
NEUTS SEG NFR BLD AUTO: 76 % (ref 32–75)
PLATELET # BLD AUTO: 214 K/UL (ref 150–400)
POTASSIUM SERPL-SCNC: 3.6 MMOL/L (ref 3.5–5.1)
PROT CSF-MCNC: 30 MG/DL (ref 15–45)
PROT SERPL-MCNC: 5.9 G/DL (ref 6.4–8.2)
RBC # BLD AUTO: 4.07 M/UL (ref 3.8–5.2)
RBC # CSF: 0 /CU MM
RBC # CSF: 85 /CU MM
SODIUM SERPL-SCNC: 140 MMOL/L (ref 136–145)
TUBE # CSF: 1
TUBE # CSF: 2
TUBE # CSF: 2
TUBE # CSF: 4
WBC # BLD AUTO: 7.8 K/UL (ref 3.6–11)
WBC # CSF: 0 /CU MM (ref 0–5)
WBC # CSF: 0 /CU MM (ref 0–5)

## 2017-03-11 PROCEDURE — 83605 ASSAY OF LACTIC ACID: CPT | Performed by: EMERGENCY MEDICINE

## 2017-03-11 PROCEDURE — 96375 TX/PRO/DX INJ NEW DRUG ADDON: CPT

## 2017-03-11 PROCEDURE — 87040 BLOOD CULTURE FOR BACTERIA: CPT | Performed by: EMERGENCY MEDICINE

## 2017-03-11 PROCEDURE — 74011250636 HC RX REV CODE- 250/636: Performed by: INTERNAL MEDICINE

## 2017-03-11 PROCEDURE — 74011000258 HC RX REV CODE- 258: Performed by: EMERGENCY MEDICINE

## 2017-03-11 PROCEDURE — 99285 EMERGENCY DEPT VISIT HI MDM: CPT

## 2017-03-11 PROCEDURE — 74011000258 HC RX REV CODE- 258: Performed by: INTERNAL MEDICINE

## 2017-03-11 PROCEDURE — 84157 ASSAY OF PROTEIN OTHER: CPT | Performed by: EMERGENCY MEDICINE

## 2017-03-11 PROCEDURE — 74011250637 HC RX REV CODE- 250/637: Performed by: INTERNAL MEDICINE

## 2017-03-11 PROCEDURE — 74011250636 HC RX REV CODE- 250/636: Performed by: EMERGENCY MEDICINE

## 2017-03-11 PROCEDURE — 77030003666 HC NDL SPINAL BD -A

## 2017-03-11 PROCEDURE — 96365 THER/PROPH/DIAG IV INF INIT: CPT

## 2017-03-11 PROCEDURE — 009U3ZX DRAINAGE OF SPINAL CANAL, PERCUTANEOUS APPROACH, DIAGNOSTIC: ICD-10-PCS | Performed by: EMERGENCY MEDICINE

## 2017-03-11 PROCEDURE — 86695 HERPES SIMPLEX TYPE 1 TEST: CPT | Performed by: INTERNAL MEDICINE

## 2017-03-11 PROCEDURE — 80053 COMPREHEN METABOLIC PANEL: CPT | Performed by: EMERGENCY MEDICINE

## 2017-03-11 PROCEDURE — 36415 COLL VENOUS BLD VENIPUNCTURE: CPT | Performed by: EMERGENCY MEDICINE

## 2017-03-11 PROCEDURE — 82945 GLUCOSE OTHER FLUID: CPT | Performed by: EMERGENCY MEDICINE

## 2017-03-11 PROCEDURE — 65660000000 HC RM CCU STEPDOWN

## 2017-03-11 PROCEDURE — 96367 TX/PROPH/DG ADDL SEQ IV INF: CPT

## 2017-03-11 PROCEDURE — 85025 COMPLETE CBC W/AUTO DIFF WBC: CPT | Performed by: EMERGENCY MEDICINE

## 2017-03-11 PROCEDURE — 75810000133 HC LUMBAR PUNCTURE

## 2017-03-11 PROCEDURE — 77030014143 HC TY PUNC LUMBR BD -A

## 2017-03-11 PROCEDURE — 71010 XR CHEST PORT: CPT

## 2017-03-11 PROCEDURE — 89050 BODY FLUID CELL COUNT: CPT | Performed by: EMERGENCY MEDICINE

## 2017-03-11 PROCEDURE — 87070 CULTURE OTHR SPECIMN AEROBIC: CPT | Performed by: EMERGENCY MEDICINE

## 2017-03-11 RX ORDER — IBUPROFEN 200 MG
600 TABLET ORAL
COMMUNITY
End: 2017-04-11 | Stop reason: ALTCHOICE

## 2017-03-11 RX ORDER — FLUOXETINE 10 MG/1
20 TABLET ORAL DAILY
COMMUNITY
End: 2018-08-02

## 2017-03-11 RX ORDER — LEVOTHYROXINE SODIUM 25 UG/1
25 TABLET ORAL
Status: DISCONTINUED | OUTPATIENT
Start: 2017-03-12 | End: 2017-03-13 | Stop reason: HOSPADM

## 2017-03-11 RX ORDER — MORPHINE SULFATE 4 MG/ML
4 INJECTION, SOLUTION INTRAMUSCULAR; INTRAVENOUS
Status: COMPLETED | OUTPATIENT
Start: 2017-03-11 | End: 2017-03-11

## 2017-03-11 RX ORDER — SODIUM CHLORIDE 9 MG/ML
125 INJECTION, SOLUTION INTRAVENOUS CONTINUOUS
Status: DISCONTINUED | OUTPATIENT
Start: 2017-03-11 | End: 2017-03-13 | Stop reason: HOSPADM

## 2017-03-11 RX ORDER — TEMAZEPAM 30 MG/1
CAPSULE ORAL
COMMUNITY
End: 2017-03-30

## 2017-03-11 RX ORDER — MORPHINE SULFATE 2 MG/ML
2 INJECTION, SOLUTION INTRAMUSCULAR; INTRAVENOUS
Status: DISCONTINUED | OUTPATIENT
Start: 2017-03-11 | End: 2017-03-12

## 2017-03-11 RX ORDER — HYDROMORPHONE HYDROCHLORIDE 1 MG/ML
1 INJECTION, SOLUTION INTRAMUSCULAR; INTRAVENOUS; SUBCUTANEOUS ONCE
Status: COMPLETED | OUTPATIENT
Start: 2017-03-11 | End: 2017-03-11

## 2017-03-11 RX ORDER — IBUPROFEN 400 MG/1
400 TABLET ORAL
Status: DISCONTINUED | OUTPATIENT
Start: 2017-03-11 | End: 2017-03-12

## 2017-03-11 RX ORDER — ONDANSETRON 2 MG/ML
4 INJECTION INTRAMUSCULAR; INTRAVENOUS
Status: COMPLETED | OUTPATIENT
Start: 2017-03-11 | End: 2017-03-11

## 2017-03-11 RX ORDER — ONDANSETRON 2 MG/ML
4 INJECTION INTRAMUSCULAR; INTRAVENOUS
Status: DISCONTINUED | OUTPATIENT
Start: 2017-03-11 | End: 2017-03-13 | Stop reason: HOSPADM

## 2017-03-11 RX ORDER — SODIUM CHLORIDE 0.9 % (FLUSH) 0.9 %
5-10 SYRINGE (ML) INJECTION EVERY 8 HOURS
Status: DISCONTINUED | OUTPATIENT
Start: 2017-03-11 | End: 2017-03-13 | Stop reason: HOSPADM

## 2017-03-11 RX ORDER — VANCOMYCIN/0.9 % SOD CHLORIDE 1.5G/250ML
1500 PLASTIC BAG, INJECTION (ML) INTRAVENOUS ONCE
Status: COMPLETED | OUTPATIENT
Start: 2017-03-11 | End: 2017-03-13

## 2017-03-11 RX ORDER — SODIUM CHLORIDE 0.9 % (FLUSH) 0.9 %
5-10 SYRINGE (ML) INJECTION AS NEEDED
Status: DISCONTINUED | OUTPATIENT
Start: 2017-03-11 | End: 2017-03-13 | Stop reason: HOSPADM

## 2017-03-11 RX ORDER — DEXTROAMPHETAMINE SACCHARATE, AMPHETAMINE ASPARTATE, DEXTROAMPHETAMINE SULFATE AND AMPHETAMINE SULFATE 7.5; 7.5; 7.5; 7.5 MG/1; MG/1; MG/1; MG/1
15 TABLET ORAL
COMMUNITY
End: 2017-03-30

## 2017-03-11 RX ORDER — DEXTROAMPHETAMINE SACCHARATE, AMPHETAMINE ASPARTATE, DEXTROAMPHETAMINE SULFATE AND AMPHETAMINE SULFATE 7.5; 7.5; 7.5; 7.5 MG/1; MG/1; MG/1; MG/1
20 TABLET ORAL 2 TIMES DAILY
COMMUNITY
End: 2018-08-02

## 2017-03-11 RX ORDER — BISACODYL 5 MG
5 TABLET, DELAYED RELEASE (ENTERIC COATED) ORAL DAILY PRN
Status: DISCONTINUED | OUTPATIENT
Start: 2017-03-11 | End: 2017-03-13 | Stop reason: HOSPADM

## 2017-03-11 RX ORDER — ACETAMINOPHEN 325 MG/1
650 TABLET ORAL
Status: DISCONTINUED | OUTPATIENT
Start: 2017-03-11 | End: 2017-03-13 | Stop reason: HOSPADM

## 2017-03-11 RX ORDER — DEXAMETHASONE SODIUM PHOSPHATE 4 MG/ML
10 INJECTION, SOLUTION INTRA-ARTICULAR; INTRALESIONAL; INTRAMUSCULAR; INTRAVENOUS; SOFT TISSUE EVERY 6 HOURS
Status: DISCONTINUED | OUTPATIENT
Start: 2017-03-11 | End: 2017-03-12

## 2017-03-11 RX ORDER — DEXAMETHASONE SODIUM PHOSPHATE 4 MG/ML
10 INJECTION, SOLUTION INTRA-ARTICULAR; INTRALESIONAL; INTRAMUSCULAR; INTRAVENOUS; SOFT TISSUE
Status: COMPLETED | OUTPATIENT
Start: 2017-03-11 | End: 2017-03-11

## 2017-03-11 RX ORDER — ALPRAZOLAM 2 MG/1
2 TABLET, EXTENDED RELEASE ORAL
COMMUNITY
End: 2017-03-30

## 2017-03-11 RX ORDER — HYDRALAZINE HYDROCHLORIDE 20 MG/ML
10 INJECTION INTRAMUSCULAR; INTRAVENOUS
Status: DISCONTINUED | OUTPATIENT
Start: 2017-03-11 | End: 2017-03-13 | Stop reason: HOSPADM

## 2017-03-11 RX ADMIN — VANCOMYCIN HYDROCHLORIDE 1500 MG: 10 INJECTION, POWDER, LYOPHILIZED, FOR SOLUTION INTRAVENOUS at 15:31

## 2017-03-11 RX ADMIN — MORPHINE SULFATE 4 MG: 4 INJECTION, SOLUTION INTRAMUSCULAR; INTRAVENOUS at 14:27

## 2017-03-11 RX ADMIN — SODIUM CHLORIDE 1000 ML: 900 INJECTION, SOLUTION INTRAVENOUS at 14:18

## 2017-03-11 RX ADMIN — SODIUM CHLORIDE 125 ML/HR: 900 INJECTION, SOLUTION INTRAVENOUS at 20:00

## 2017-03-11 RX ADMIN — HYDROMORPHONE HYDROCHLORIDE 1 MG: 1 INJECTION, SOLUTION INTRAMUSCULAR; INTRAVENOUS; SUBCUTANEOUS at 17:41

## 2017-03-11 RX ADMIN — IBUPROFEN 400 MG: 400 TABLET ORAL at 21:28

## 2017-03-11 RX ADMIN — AMPICILLIN SODIUM 2 G: 2 INJECTION, POWDER, FOR SOLUTION INTRAMUSCULAR; INTRAVENOUS at 21:29

## 2017-03-11 RX ADMIN — AMPICILLIN SODIUM 2 G: 2 INJECTION, POWDER, FOR SOLUTION INTRAMUSCULAR; INTRAVENOUS at 17:46

## 2017-03-11 RX ADMIN — Medication 10 ML: at 20:01

## 2017-03-11 RX ADMIN — ACYCLOVIR SODIUM 244 MG: 50 INJECTION, SOLUTION INTRAVENOUS at 20:01

## 2017-03-11 RX ADMIN — CEFTRIAXONE 2 G: 2 INJECTION, POWDER, FOR SOLUTION INTRAMUSCULAR; INTRAVENOUS at 14:19

## 2017-03-11 RX ADMIN — ONDANSETRON HYDROCHLORIDE 4 MG: 2 INJECTION, SOLUTION INTRAMUSCULAR; INTRAVENOUS at 14:24

## 2017-03-11 RX ADMIN — DEXAMETHASONE SODIUM PHOSPHATE 10 MG: 4 INJECTION, SOLUTION INTRAMUSCULAR; INTRAVENOUS at 17:33

## 2017-03-11 RX ADMIN — DEXAMETHASONE SODIUM PHOSPHATE 10 MG: 4 INJECTION, SOLUTION INTRAMUSCULAR; INTRAVENOUS at 14:29

## 2017-03-11 NOTE — ED NOTES
Assumed care of patient for shift change. Bedside verbal report received from [facility]. Patient alert and oriented x 4 and c/o HA and posterior neck pain 5/10. 0.9% NS infusing left AC 20g PIV which is intact. NSR on cardiac monitor. VSS. Vancomycin infusion pending.  at bedside and call bell in reach. Will continue to monitor.

## 2017-03-11 NOTE — ED PROVIDER NOTES
HPI Comments: Norah Keating is a 49 y.o. female, pmhx significant for hypothyroidism, spinal stenosis, who presents ambulatory to the ED c/o gradually worsening 7/10 HA with associated photophobia, 9/10 neck pain with movement, nausea worse with movement, chills and BLE weakness x 3/8/2017. Pt reports that she had dizziness at onset of her HA on 3/8/17, so she came into the [id number] [facility] ED that night and was dx'd with an internal carotid aneurysm. They did a spinal tap and found bacteria. However, she refused admission and was d/c with rx's for tramadol and Fioricet. Pt had little relief from her pain medications, so she went to [facility] ED the next day, where she received pain control and was d/c. She has not been out of bed since her [facility] ED visit save going to the bathroom and that she has not been eating or drinking due to her nausea. Of note, pt states that while at home for the past 2 days, she began developing cold sxs including congestion. She mentions a small amount of blood present when her sputum comes up. Pt specifically denies fever, vomiting, numbness or tingling in her BLE. PCP: Demetrius Rubin MD      Social Hx: -tobacco, +EtOH (socially), -Illicit Drugs   FHx: no pertinent family hx   Medication Allergies: percocet      There are no other complaints, changes, or physical findings at this time. The history is provided by the patient. Past Medical History:   Diagnosis Date    Spinal stenosis     Thyroid disease     hypothyroid       Past Surgical History:   Procedure Laterality Date    HX ORTHOPAEDIC  2002    ACL repair, pt unsure which side    HX ORTHOPAEDIC  2013    left shoulder surgery    VT COLONOSCOPY FLX DX W/COLLJ SPEC WHEN PFRMD  5/6/2013              History reviewed. No pertinent family history. Social History     Social History    Marital status:      Spouse name: N/A    Number of children: N/A    Years of education: N/A     Occupational History    Not on file. Social History Main Topics    Smoking status: Never Smoker    Smokeless tobacco: Not on file    Alcohol use Yes      Comment: socially    Drug use: No    Sexual activity: Not on file     Other Topics Concern    Not on file     Social History Narrative         ALLERGIES: Percocet [oxycodone-acetaminophen]    Review of Systems   Constitutional: Positive for chills. Eyes: Negative. Respiratory: Negative for chest tightness. Gastrointestinal: Positive for nausea. Negative for vomiting. Endocrine: Negative for heat intolerance. Genitourinary: Negative. Musculoskeletal: Positive for neck pain. Skin: Negative for rash. Allergic/Immunologic: Negative for immunocompromised state. Neurological: Positive for dizziness, weakness (BLE) and headaches. Negative for numbness (or tingling of the BLE). Hematological: Does not bruise/bleed easily. Psychiatric/Behavioral: Negative. All other systems reviewed and are negative. Patient Vitals for the past 12 hrs:   Temp Pulse Resp BP SpO2   03/11/17 1500 - - - 121/77 90 %   03/11/17 1430 - 62 - 131/78 97 %   03/11/17 1345 - (!) 56 - 140/81 94 %   03/11/17 1330 - (!) 55 - 122/76 96 %   03/11/17 1300 - (!) 59 18 121/77 93 %   03/11/17 1230 - 60 - 125/75 94 %   03/11/17 1208 98 °F (36.7 °C) 64 18 147/76 98 %       Physical Exam   Constitutional: She is oriented to person, place, and time. She appears well-developed and well-nourished. She appears distressed (moderate). HENT:   Head: Normocephalic and atraumatic. Eyes: EOM are normal. Pupils are equal, round, and reactive to light. Neck: Normal range of motion. Neck non-tender, but pain with movement    Cardiovascular: Normal rate, regular rhythm and normal heart sounds. Pulmonary/Chest: Effort normal and breath sounds normal. No respiratory distress. Lungs clear    Abdominal: Soft. Bowel sounds are normal. She exhibits no mass. There is no tenderness.    Musculoskeletal: Normal range of motion. She exhibits no edema. Neurological: She is alert and oriented to person, place, and time. Coordination normal.   Photophobic  Sensory motor intact    Skin: Skin is warm and dry. Psychiatric: She has a normal mood and affect. Nursing note and vitals reviewed. MDM  Number of Diagnoses or Management Options  Acute intractable headache, unspecified headache type:   Acute upper respiratory infection:   Right internal carotid artery aneurysm:   Diagnosis management comments: DDx: meningitis, viral syndrome, intractable HA, internal carotid aneurysm, dehydration, bronchitis, pneumonia        Amount and/or Complexity of Data Reviewed  Clinical lab tests: ordered and reviewed  Tests in the radiology section of CPT®: ordered and reviewed  Review and summarize past medical records: yes  Discuss the patient with other providers: yes (Hospitalist )  Independent visualization of images, tracings, or specimens: yes    Patient Progress  Patient progress: stable    ED Course       Procedures  CONSULT NOTE:   1:52 PM  Bruce Ahumada MD spoke with Dr. Saleem Galvan,   Specialty: Hospitalist  Discussed pt's hx, disposition, and available diagnostic and imaging results. Reviewed care plans. Consultant will admit  Written by Lauren Zamora ED Scribe, as dictated by Bruce Ahumada MD.    4:10 PM  Dr. Gary Heard believes that her last LP could have been contaminated, so she would like another one. Written by Lauren Zamora ED Scribe as dictated by Bruce Ahumada MD    Procedure Note - Lumbar Puncture:   4:42 PM  Performed by Bruce Ahumada MD .     Immediately prior to the procedure, the patient was reevaluated and found suitable for the planned procedure and any planned medications. Immediately prior to the procedure a time out was called to verify the correct patient, procedure, equipment, staff, and marking as appropriate. The site prepped with Betadine. Anesthesia was obtained with 5 mLs 1% lidocaine. Patient position: seated  Intervertebral space: L4-L5  A 20 gauge spinal needle was introduced with 4 attempts. CSF was collected, clear in appearance, and sent for analysis. Sterile dressing applied. Estimated blood loss: 4 mLs  The procedure took 16-30 minutes, and pt tolerated well. Written by Allison Solano and Ella Koroma, ED Scribes, as dictated by Nicanor Higgins MD.        LABORATORY TESTS:  Recent Results (from the past 12 hour(s))   CBC WITH AUTOMATED DIFF    Collection Time: 03/11/17  1:21 PM   Result Value Ref Range    WBC 7.8 3.6 - 11.0 K/uL    RBC 4.07 3.80 - 5.20 M/uL    HGB 11.0 (L) 11.5 - 16.0 g/dL    HCT 33.9 (L) 35.0 - 47.0 %    MCV 83.3 80.0 - 99.0 FL    MCH 27.0 26.0 - 34.0 PG    MCHC 32.4 30.0 - 36.5 g/dL    RDW 14.0 11.5 - 14.5 %    PLATELET 409 712 - 087 K/uL    NEUTROPHILS 76 (H) 32 - 75 %    LYMPHOCYTES 16 12 - 49 %    MONOCYTES 8 5 - 13 %    EOSINOPHILS 0 0 - 7 %    BASOPHILS 0 0 - 1 %    ABS. NEUTROPHILS 6.0 1.8 - 8.0 K/UL    ABS. LYMPHOCYTES 1.2 0.8 - 3.5 K/UL    ABS. MONOCYTES 0.6 0.0 - 1.0 K/UL    ABS. EOSINOPHILS 0.0 0.0 - 0.4 K/UL    ABS. BASOPHILS 0.0 0.0 - 0.1 K/UL   METABOLIC PANEL, COMPREHENSIVE    Collection Time: 03/11/17  1:21 PM   Result Value Ref Range    Sodium 140 136 - 145 mmol/L    Potassium 3.6 3.5 - 5.1 mmol/L    Chloride 104 97 - 108 mmol/L    CO2 28 21 - 32 mmol/L    Anion gap 8 5 - 15 mmol/L    Glucose 80 65 - 100 mg/dL    BUN 9 6 - 20 MG/DL    Creatinine 0.65 0.55 - 1.02 MG/DL    BUN/Creatinine ratio 14 12 - 20      GFR est AA >60 >60 ml/min/1.73m2    GFR est non-AA >60 >60 ml/min/1.73m2    Calcium 8.0 (L) 8.5 - 10.1 MG/DL    Bilirubin, total 0.3 0.2 - 1.0 MG/DL    ALT (SGPT) 88 (H) 12 - 78 U/L    AST (SGOT) 56 (H) 15 - 37 U/L    Alk.  phosphatase 71 45 - 117 U/L    Protein, total 5.9 (L) 6.4 - 8.2 g/dL    Albumin 2.8 (L) 3.5 - 5.0 g/dL    Globulin 3.1 2.0 - 4.0 g/dL    A-G Ratio 0.9 (L) 1.1 - 2.2     LACTIC ACID, PLASMA    Collection Time: 03/11/17 1:21 PM   Result Value Ref Range    Lactic acid 0.7 0.4 - 2.0 MMOL/L       MEDICATIONS GIVEN:  Medications   cefTRIAXone (ROCEPHIN) 2 g in 0.9% sodium chloride (MBP/ADV) 50 mL (2 g IntraVENous New Bag 3/11/17 1419)   vancomycin (VANCOCIN) 1500 mg in  ml infusion (1,500 mg IntraVENous New Bag 3/11/17 1531)   vancomycin (VANCOCIN) 1,000 mg in 0.9% sodium chloride (MBP/ADV) 250 mL (not administered)   sodium chloride (NS) flush 5-10 mL (not administered)   sodium chloride (NS) flush 5-10 mL (not administered)   0.9% sodium chloride infusion (not administered)   ibuprofen (MOTRIN) tablet 400 mg (not administered)   ondansetron (ZOFRAN) injection 4 mg (not administered)   bisacodyl (DULCOLAX) tablet 5 mg (not administered)   dexamethasone (DECADRON) 4 mg/mL injection 10 mg (10 mg IntraVENous Given 3/11/17 1429)   sodium chloride 0.9 % bolus infusion 1,000 mL (1,000 mL IntraVENous New Bag 3/11/17 1418)   morphine injection 4 mg (4 mg IntraVENous Given 3/11/17 1427)   ondansetron (ZOFRAN) injection 4 mg (4 mg IntraVENous Given 3/11/17 1424)       IMPRESSION:  1. Acute intractable headache, unspecified headache type    2. Right internal carotid artery aneurysm    3. Acute upper respiratory infection        PLAN: Admit to Hospitalist  1:53 PM  Patient is being admitted to the hospital by Dr. Stacy Goldsmith. The results of their tests and reasons for their admission have been discussed with them and/or available family. They convey agreement and understanding for the need to be admitted and for their admission diagnosis. Written by DEBRA Gimenez, as dictated by Sana Pope MD.    This note is prepared by Carol Rod acting as scribe for MD Sana Lanier MD : The scribe's documentation has been prepared under my direction and personally reviewed by me in its entirety.  I confirm that the note above accurately reflects all work, treatment, procedures, and medical decision making performed by me.

## 2017-03-11 NOTE — ED TRIAGE NOTES
Pt notified by YULIYA Rubio that she has bacteria in her spinal fluid from an LP earlier this week and advised to return to the ED.  Pt continues to have a YEH

## 2017-03-11 NOTE — ED NOTES
TRANSFER - OUT REPORT:    Verbal report given to [address] Vahid Wade RN on Nikki Whitley  being transferred to [facility] for routine progression of care       Report consisted of patients Situation, Background, Assessment and   Recommendations(SBAR). Information from the following report(s) SBAR, ED Summary, Procedure Summary, Intake/Output, MAR and Recent Results was reviewed with the receiving nurse. Lines:   Peripheral IV 03/11/17 Left Antecubital (Active)   Site Assessment Clean, dry, & intact 3/11/2017  1:19 PM   Phlebitis Assessment 0 3/11/2017  1:19 PM   Infiltration Assessment 0 3/11/2017  1:19 PM   Dressing Status Clean, dry, & intact 3/11/2017  1:19 PM   Dressing Type Transparent 3/11/2017  1:19 PM   Hub Color/Line Status Pink;Flushed 3/11/2017  1:19 PM   Action Taken Catheter retaped 3/11/2017  1:19 PM       Peripheral IV 03/11/17 Right Wrist (Active)   Site Assessment Clean, dry, & intact 3/11/2017  5:33 PM   Phlebitis Assessment 0 3/11/2017  5:33 PM   Infiltration Assessment 0 3/11/2017  5:33 PM   Dressing Status Clean, dry, & intact 3/11/2017  5:33 PM   Dressing Type Tape;Transparent 3/11/2017  5:33 PM   Hub Color/Line Status Pink;Flushed;Patent 3/11/2017  5:33 PM        Opportunity for questions and clarification was provided.       Patient transported with:   [employer]

## 2017-03-11 NOTE — ED NOTES
From the hallway, could hear IV pump beeping. Went into the room, the patient was lying in bed with her head covered with the blanket. Patients arm was repositioned and the pump was reset.

## 2017-03-11 NOTE — ED NOTES
Pt called and asked to come back to ED because bacteria was grown in her spinal fluid sample from her visit this past Wednesday. Pt states she continues to have headache and the medicine that was prescribed at last visit is not relieving her pain.

## 2017-03-11 NOTE — PROGRESS NOTES
Pharmacy Clarification of Prior to Admission Medication Regimen     The patient was interviewed regarding current PTA medication list, use and drug allergies;  family member present in room and obtained permission from patient to discuss drug regimen with visitor(s) present. The patient was questioned regarding use of any other inhalers, topical products, over the counter medications, herbal medications, vitamin products or ophthalmic/nasal/otic medication use. Allergy Update: confirmed allergy to oxycodone/APAP as itching    Recommendations/Findings: The following amendments were made to the patient's active medication list on file at [facility]:   1) Additions:    Alprazolam XR   Fluoxetine   Dextroamphetamine-amphetamine   Ibuprofen   Temazepam     2) Deletions:    Hydromorphone     3) Changes: NONE        -Clarified PTA med list with information provided by Katy Cook, patient and patient's family member. PTA medication list was corrected to the following:     Prior to Admission Medications   Prescriptions Last Dose Informant Patient Reported? Taking? ALPRAZolam (XANAX XR) 2 mg XR tablet 3/7/2017 Significant Other Yes Yes   Sig: Take 2 mg by mouth every morning. FLUoxetine (PROZAC) 10 mg tablet 3/11/2017 at Unknown time Significant Other Yes Yes   Sig: Take 20 mg by mouth daily. butalbital-acetaminophen-caff (FIORICET) -40 mg per capsule 3/11/2017 at Unknown time Significant Other No Yes   Sig: Take 1 Cap by mouth every four (4) hours as needed for Pain for up to 4 days. Max Daily Amount: 6 Caps. dextroamphetamine-amphetamine (ADDERALL) 30 mg tablet 3/7/2017 Significant Other Yes Yes   Sig: Take 30 mg by mouth daily. Dextroamphetamine-amphetamine 30 mg tab administration instructions: Take one tab orally every morning and 1/2 tab (15 mg) every afternoon)   dextroamphetamine-amphetamine (ADDERALL) 30 mg tablet 3/7/2017 Significant Other Yes Yes   Sig: Take 15 mg by mouth daily (with lunch). Dextroamphetamine-amphetamine 30 mg tab administration instructions: Take one tab orally every morning and 1/2 tab (15 mg) every afternoon)   ibuprofen (MOTRIN) 200 mg tablet 3/10/2017 at Unknown time Significant Other Yes Yes   Sig: Take 600 mg by mouth every twelve (12) hours as needed for Pain.   levothyroxine (SYNTHROID) 25 mcg tablet 3/11/2017 at Unknown time Significant Other Yes Yes   Sig: Take 25 mcg by mouth Daily (before breakfast). temazepam (RESTORIL) 30 mg capsule 3/10/2017 Significant Other Yes Yes   Sig: Take  by mouth nightly as needed for Sleep.   traMADol (ULTRAM) 50 mg tablet 3/10/2017 at Unknown time Significant Other No Yes   Sig: Take 1 Tab by mouth every six (6) hours as needed for Pain for up to 10 days. Max Daily Amount: 200 mg.       Facility-Administered Medications: None          Thank you,  Lauren Francis, PharmD, BCPS  Clinical Pharmacy Specialist

## 2017-03-11 NOTE — PROGRESS NOTES
Called and spoke with patient and  with her permission. She feels no better, notes headache and stiffness in her neck and has been in bed since 3/9 due to the pain. Discussed gram + cocci in CSF, could be contaminate however may not and since she is feeling worse I advised her to be re-evaluated. Patient and  agree and he will bring her in shortly.   Lorraine Singh PA-C

## 2017-03-11 NOTE — PROGRESS NOTES
Pharmacy Automatic Renal Dosing Protocol - Antimicrobials    Indication for Antimicrobials: CNS infection  Current Regimen of Each Antimicrobial (Start Day & Day of Therapy):  Vancomycin - pharmacy dosing (start 3/11, day 1)  Ceftriaxone 2 gm every 12 hours (start 3/11, day 1)    Goal Vancomycin Level (if needed): 15-20  Level(s) Ordered (if needed):   Measured / Extrapolated Vancomycin Levels (if drawn):     Significant Cultures:   3/8 CSF: GPC pending    CAPD, Hemodialysis or Renal Replacement Therapy: n/a   Paralysis, amputations, malnutrition: n/a  Recent Labs      17   1321  17   1335  17   1512   CREA  0.65  0.77  0.70   BUN  9  12  13   WBC  7.8  8.7  4.0     Temp (24hrs), Av °F (36.7 °C), Min:98 °F (36.7 °C), Max:98 °F (36.7 °C)    Creatinine Clearance (Creatinine Clearance (ml/min)):  70    Impression/Plan:   Vancomycin 1500 mg x 1 dose, then 1000 mg every 12 hours       Pharmacy will follow daily and adjust medications as appropriate for renal function and/or serum levels.     Thank you,  Balta Bustillo, PHARMD

## 2017-03-11 NOTE — IP AVS SNAPSHOT
[address] [facility] 
[phone] Patient: Gabbi Paniagua MRN: [id number] :1960 You are allergic to the following Allergen Reactions Percocet (Oxycodone-Acetaminophen) Itching Immunizations Administered for This Admission Name Date Influenza Vaccine (Quad) PF 3/13/2017 Recent Documentation Weight BMI OB Status Smoking Status 55.5 kg 24.71 kg/m2 Postmenopausal Never Smoker Unresulted Labs Order Current Status CULTURE, BLOOD, PAIRED Preliminary result CULTURE, CSF W GRAM STAIN Preliminary result Emergency Contacts Name Discharge Info Relation Home Work Mobile Bebo Gama  Spouse [3] [phone] About your hospitalization You were admitted on:  2017 You last received care in the:  [facility] 3 NEUROSCIENCE TELEMETRY You were discharged on:  2017 Unit phone number:  [phone] Why you were hospitalized Your primary diagnosis was:  Not on File Your diagnoses also included:  Headache Providers Seen During Your Hospitalizations Provider Role Specialty Primary office phone Jazmyn Duenas MD Attending Provider Emergency Medicine [phone] Brad Wagoner MD Attending Provider Internal Medicine [phone] Regla Valdez MD Attending Provider Internal Medicine [phone] Your Primary Care Physician (PCP) Primary Care Physician Office Phone Office Fax OTHER, PHYS ** None ** ** None ** Follow-up Information Follow up With Details Comments Contact Info Demetrius Rubin MD   Patient can only remember the practice name and not the physician Your Appointments   8:40 AM EDT  
[facility] MAMMO SCREENING with ED [facility] 3 [facility] [address]) [address] [facility] [address] [address] [phone] Shower or bathe using soap and water. Do not use deodorant, powder, perfumes, or lotion the day of your exam.  If your prior mammograms were not performed at [place] [address] please bring films with you or forward prior images 2 days before your procedure. Check in at registration 15min before your appointment time unless you were instructed to do otherwise. A script is not necessary, but if you have one, please bring it on the day of the mammogram or have it faxed to the department. [facility] [phone] [facility]  [phone] [facility] [address] [place]  [phone] [phone] [place] [address] [facility] [phone] Patient should report to outpatient registration (Medical Office Building One) 30 minutes prior to the appointment time unless instructed otherwise. Current Discharge Medication List  
  
START taking these medications Dose & Instructions Dispensing Information Comments Morning Noon Evening Bedtime  
 cyclobenzaprine 10 mg tablet Commonly known as:  FLEXERIL Your last dose was: Your next dose is: Other:  _________ Dose:  5 mg Take 0.5 Tabs by mouth three (3) times daily as needed for Muscle Spasm(s). Quantity:  30 Tab Refills:  0  
     
   
   
   
  
 methylPREDNISolone 4 mg tablet Commonly known as:  Rad Dima Your last dose was: Your next dose is: Other:  _________ Take 1 tablets 3 times a day for 2 days, then take 1 tablet 2 times a day for 2 days, then take 1 tablet daily for 2 days then stop Quantity:  1 Dose Pack Refills:  0 CONTINUE these medications which have NOT CHANGED Dose & Instructions Dispensing Information Comments Morning Noon Evening Bedtime ALPRAZolam 2 mg XR tablet Commonly known as:  XANAX XR Your last dose was: Your next dose is: Other:  _________ Dose:  2 mg Take 2 mg by mouth every morning. Refills:  0  
     
   
   
   
  
 * dextroamphetamine-amphetamine 30 mg tablet Commonly known as:  ADDERALL Your last dose was: Your next dose is: Other:  _________ Dose:  30 mg Take 30 mg by mouth daily. Dextroamphetamine-amphetamine 30 mg tab administration instructions: Take one tab orally every morning and 1/2 tab (15 mg) every afternoon) Refills:  0  
     
   
   
   
  
 * dextroamphetamine-amphetamine 30 mg tablet Commonly known as:  ADDERALL Your last dose was: Your next dose is: Other:  _________ Dose:  15 mg Take 15 mg by mouth daily (with lunch). Dextroamphetamine-amphetamine 30 mg tab administration instructions: Take one tab orally every morning and 1/2 tab (15 mg) every afternoon) Refills:  0 FLUoxetine 10 mg tablet Commonly known as:  PROzac Your last dose was: Your next dose is: Other:  _________ Dose:  20 mg Take 20 mg by mouth daily. Refills:  0  
     
   
   
   
  
 ibuprofen 200 mg tablet Commonly known as:  MOTRIN Your last dose was: Your next dose is: Other:  _________ Dose:  600 mg Take 600 mg by mouth every twelve (12) hours as needed for Pain. Refills:  0  
     
   
   
   
  
 levothyroxine 25 mcg tablet Commonly known as:  SYNTHROID Your last dose was: Your next dose is: Other:  _________ Dose:  25 mcg Take 25 mcg by mouth Daily (before breakfast). Refills:  0  
     
   
   
   
  
 temazepam 30 mg capsule Commonly known as:  RESTORIL Your last dose was: Your next dose is: Other:  _________ Take  by mouth nightly as needed for Sleep. Refills:  0  
     
   
   
   
  
 * Notice:   This list has 2 medication(s) that are the same as other medications prescribed for you. Read the directions carefully, and ask your doctor or other care provider to review them with you. STOP taking these medications   
 butalbital-acetaminophen-caff -40 mg per capsule Commonly known as:  FIORICET  
   
  
 traMADol 50 mg tablet Commonly known as:  ULTRAM  
   
  
  
  
Where to Get Your Medications Information on where to get these meds will be given to you by the nurse or doctor. ! Ask your nurse or doctor about these medications  
  cyclobenzaprine 10 mg tablet  
 methylPREDNISolone 4 mg tablet Discharge Instructions None Discharge Orders None Introducing [facility] SERVICES! Sen Ja[employer] introduces [employer] patient portal. Now you can access parts of your medical record, email your doctor's office, and request medication refills online. 1. In your internet browser, go to [email or web address] 2. Click on the First Time User? Click Here link in the Sign In box. You will see the New Member Sign Up page. 3. Enter your [employer] Access Code exactly as it appears below. You will not need to use this code after youve completed the sign-up process. If you do not sign up before the expiration date, you must request a new code. · [employer] Access Code: [email or web address] Expires: 5/24/2017 11:33 AM 
 
4. Enter the last four digits of your Social Security Number (xxxx) and Date of Birth (mm/dd/yyyy) as indicated and click Submit. You will be taken to the next sign-up page. 5. Create a [employer] ID. This will be your [employer] login ID and cannot be changed, so think of one that is secure and easy to remember. 6. Create a [employer] password. You can change your password at any time. 7. Enter your Password Reset Question and Answer. This can be used at a later time if you forget your password. 8. Enter your e-mail address. You will receive e-mail notification when new information is available in [address] 9. Click Sign Up. You can now view and download portions of your medical record. 10. Click the Download Summary menu link to download a portable copy of your medical information. If you have questions, please visit the Frequently Asked Questions section of the [employer] website. Remember, [employer] is NOT to be used for urgent needs. For medical emergencies, dial 911. Now available from your iPhone and Android! General Information Please provide this summary of care documentation to your next provider. Patient Signature:  ____________________________________________________________ Date:  ____________________________________________________________  
  
[facility] Provider Signature:  ____________________________________________________________ Date:  ____________________________________________________________

## 2017-03-11 NOTE — H&P
Hospitalist Admission Note    NAME: Clint Abreu   :  1960   MRN:  [id number]     Date/Time:  3/11/2017 4:09 PM    Patient PCP: Renita Rubin MD  ________________________________________________________________________    My assessment of this patient's clinical condition and my plan of care is as follows. Assessment / Plan:  Headache  Presumed Acute Meningitis  -head CT/CTA head and neck on 3/8 reviewed  -presenting with headache,nuchal rigidity, however no fever. Last LP not convincing for meningitis, however culture grew gram + cocci in the setting of persistent HA and nuchal rigidity and photophobia  -pt received rocephin and vancomycin ~ 2 hrs ago, discussed with ED physician to repeat LP due to concern of contaminant on previous csf culture  -will continue empiric abx with rocephin, vancomycin, and ampicillin given her age  -Acyclovir will be added for HSV coverage. Will need to f/u with csf culture and de-escalate abx  -high dose decadron x4 days, de-escalate as appropriate  -gentle IVF  -tylenol and Fioricet prn for pain and fever control if possible. Avoid narcs to avoid medication overuse headache    Anxiety  -stable    Hypothyroidism  -continue synthroid      Code Status: Full  Surrogate Decision Maker:  Jimmie Chauhan at [phone]    DVT Prophylaxis: scds  GI Prophylaxis: not indicated    Baseline: independent        Subjective:   CHIEF COMPLAINT: headache with photophobia and nuchal rigidity    HISTORY OF PRESENT ILLNESS:     Clint Abreu is a 49 y.o.  female w p hx significant for hypothyroidism, anxiety, spinal stenosis present to ED c/o of worsening HA associated with 8/10 in severity, photophobia, neck pain with any movement, nausea and poor appetite for ~4 days. Per hx and chart review, pt presented to ED on 3/8 for similar symptoms, had LP done then and was sent home on pain meds.   She presented to [facility] ED the next day for similar complaints and was also sent home to continue pain meds for headache. Patient states since last ER visit, she had remained debilitated, in bed with poor appetite. She states her HA is throbbing, lights and movment of neck makes HA worst.  No relief with pain meds. She was called today to return to ED due to gram + cocci on csf culture. Patient denies any known fevers, cp, sob, palpitations, n/v/d. In the ED, vitals stable. cxr neg. CT head/CTA head and neck reviewed. We were asked to admit for work up and evaluation of the above problems. Past Medical History:   Diagnosis Date    Spinal stenosis     Thyroid disease     hypothyroid        Past Surgical History:   Procedure Laterality Date    HX ORTHOPAEDIC  2002    ACL repair, pt unsure which side    HX ORTHOPAEDIC  2013    left shoulder surgery    WI COLONOSCOPY FLX DX W/COLLJ SPEC WHEN PFRMD  5/6/2013            Social History   Substance Use Topics    Smoking status: Never Smoker    Smokeless tobacco: Not on file    Alcohol use Yes      Comment: socially        History reviewed. No pertinent family history. Allergies   Allergen Reactions    Percocet [Oxycodone-Acetaminophen] Itching        Prior to Admission medications    Medication Sig Start Date End Date Taking? Authorizing Provider   butalbital-acetaminophen-caff (FIORICET) -40 mg per capsule Take 1 Cap by mouth every four (4) hours as needed for Pain for up to 4 days. Max Daily Amount: 6 Caps. 3/8/17 3/12/17  Bridget Murrell DO   traMADol (ULTRAM) 50 mg tablet Take 1 Tab by mouth every six (6) hours as needed for Pain for up to 10 days. Max Daily Amount: 200 mg. 3/8/17 3/18/17  Bridget Murrell DO   HYDROmorphone (DILAUDID) 2 mg tablet Take 2 Tabs by mouth every four (4) hours as needed for Pain. 3/8/13   Jordy Buckley MD   levothyroxine (SYNTHROID) 25 mcg tablet Take 25 mcg by mouth Daily (before breakfast).     Historical Provider       REVIEW OF SYSTEMS:     I am not able to complete the review of systems because: The patient is intubated and sedated    The patient has altered mental status due to his acute medical problems    The patient has baseline aphasia from prior stroke(s)    The patient has baseline dementia and is not reliable historian    The patient is in acute medical distress and unable to provide information           Total of 12 systems reviewed as follows:       POSITIVE= underlined text  Negative = text not underlined  General:  fever, chills, sweats, generalized weakness, weight loss/gain,      loss of appetite   Eyes:    blurred vision, eye pain, loss of vision, double vision  ENT:    rhinorrhea, pharyngitis, + neck pain with movement   Respiratory:   cough, sputum production, SOB, MANN, wheezing, pleuritic pain   Cardiology:   chest pain, palpitations, orthopnea, PND, edema, syncope   Gastrointestinal:  abdominal pain , N/V, diarrhea, dysphagia, constipation, bleeding   Genitourinary:  frequency, urgency, dysuria, hematuria, incontinence   Muskuloskeletal :  arthralgia, myalgia, back pain  Hematology:  easy bruising, nose or gum bleeding, lymphadenopathy   Dermatological: rash, ulceration, pruritis, color change / jaundice  Endocrine:   hot flashes or polydipsia   Neurological:  headache, dizziness, confusion, focal weakness, paresthesia,     Speech difficulties, memory loss, gait difficulty  Psychological: Feelings of anxiety, depression, agitation    Objective:   VITALS:    Visit Vitals    /77    Pulse 62    Temp 98 °F (36.7 °C)    Resp 18    Wt 55.5 kg (122 lb 5.7 oz)    SpO2 90%    BMI 24.71 kg/m2       PHYSICAL EXAM:    General:    Alert, cooperative, no distress, appears stated age. HEENT: Atraumatic, anicteric sclerae, pink conjunctivae     No oral ulcers, mucosa moist, + nuchal rigidity, photophobia  Neck:  Supple, symmetrical,  thyroid: non tender  Lungs:   Clear to auscultation bilaterally. No Wheezing or Rhonchi. No rales.   Chest wall:  No tenderness No Accessory muscle use. Heart:   Regular  rhythm,  No  murmur   No edema  Abdomen:   Soft, non-tender. Not distended. Bowel sounds normal  Extremities: No cyanosis. No clubbing      Capillary refill normal,  Radial pulse 2+  Skin:     Not pale. Not Jaundiced  No rashes   Psych:  Good insight. Not depressed. Not anxious or agitated. Neurologic: EOMs intact. No facial asymmetry. No aphasia or slurred speech. Symmetrical strength, Sensation grossly intact. Alert and oriented X 4.     _______________________________________________________________________  Care Plan discussed with:    Comments   Patient x    Family  x    RN     Care Manager                    Consultant:      _______________________________________________________________________  Expected  Disposition:   Home with Family x   HH/PT/OT/RN    SNF/LTC    ALEJO    ________________________________________________________________________  TOTAL TIME:  61 Minutes    Critical Care Provided     Minutes non procedure based      Comments    x Reviewed previous records   >50% of visit spent in counseling and coordination of care  Discussion with patient and/or family and questions answered       ________________________________________________________________________  Signed: Sherron Buneo MD    Procedures: see electronic medical records for all procedures/Xrays and details which were not copied into this note but were reviewed prior to creation of Plan.     LAB DATA REVIEWED:    Recent Results (from the past 24 hour(s))   CBC WITH AUTOMATED DIFF    Collection Time: 03/11/17  1:21 PM   Result Value Ref Range    WBC 7.8 3.6 - 11.0 K/uL    RBC 4.07 3.80 - 5.20 M/uL    HGB 11.0 (L) 11.5 - 16.0 g/dL    HCT 33.9 (L) 35.0 - 47.0 %    MCV 83.3 80.0 - 99.0 FL    MCH 27.0 26.0 - 34.0 PG    MCHC 32.4 30.0 - 36.5 g/dL    RDW 14.0 11.5 - 14.5 %    PLATELET 843 386 - 731 K/uL    NEUTROPHILS 76 (H) 32 - 75 %    LYMPHOCYTES 16 12 - 49 %    MONOCYTES 8 5 - 13 %    EOSINOPHILS 0 0 - 7 %    BASOPHILS 0 0 - 1 %    ABS. NEUTROPHILS 6.0 1.8 - 8.0 K/UL    ABS. LYMPHOCYTES 1.2 0.8 - 3.5 K/UL    ABS. MONOCYTES 0.6 0.0 - 1.0 K/UL    ABS. EOSINOPHILS 0.0 0.0 - 0.4 K/UL    ABS. BASOPHILS 0.0 0.0 - 0.1 K/UL   METABOLIC PANEL, COMPREHENSIVE    Collection Time: 03/11/17  1:21 PM   Result Value Ref Range    Sodium 140 136 - 145 mmol/L    Potassium 3.6 3.5 - 5.1 mmol/L    Chloride 104 97 - 108 mmol/L    CO2 28 21 - 32 mmol/L    Anion gap 8 5 - 15 mmol/L    Glucose 80 65 - 100 mg/dL    BUN 9 6 - 20 MG/DL    Creatinine 0.65 0.55 - 1.02 MG/DL    BUN/Creatinine ratio 14 12 - 20      GFR est AA >60 >60 ml/min/1.73m2    GFR est non-AA >60 >60 ml/min/1.73m2    Calcium 8.0 (L) 8.5 - 10.1 MG/DL    Bilirubin, total 0.3 0.2 - 1.0 MG/DL    ALT (SGPT) 88 (H) 12 - 78 U/L    AST (SGOT) 56 (H) 15 - 37 U/L    Alk.  phosphatase 71 45 - 117 U/L    Protein, total 5.9 (L) 6.4 - 8.2 g/dL    Albumin 2.8 (L) 3.5 - 5.0 g/dL    Globulin 3.1 2.0 - 4.0 g/dL    A-G Ratio 0.9 (L) 1.1 - 2.2     LACTIC ACID, PLASMA    Collection Time: 03/11/17  1:21 PM   Result Value Ref Range    Lactic acid 0.7 0.4 - 2.0 MMOL/L

## 2017-03-11 NOTE — ED NOTES
Patient laying supine as instructed. CSF samples to lab. Discussed proper labeling with . Pharmacist in to discuss home meds with patient.  Lunch served to patient after approval from Dr. Luis Muhammad.

## 2017-03-11 NOTE — ED NOTES
Bedside shift change report given to Terese Coats RN (oncoming nurse) by Carl King RN and Devika Pond RN (offgoing nurse). Report included the following information SBAR, Kardex, ED Summary and MAR. Pt resting comfortably with call bell within reach.    at bedside

## 2017-03-11 NOTE — ED NOTES
LP performed by Dr. Chuck Joiner while patient in in sitting position. Patient tolerated well.  Samples obtained by MD.

## 2017-03-12 LAB
ANION GAP BLD CALC-SCNC: 10 MMOL/L (ref 5–15)
BASOPHILS # BLD AUTO: 0 K/UL (ref 0–0.1)
BASOPHILS # BLD: 0 % (ref 0–1)
BUN SERPL-MCNC: 9 MG/DL (ref 6–20)
BUN/CREAT SERPL: 12 (ref 12–20)
CALCIUM SERPL-MCNC: 7.2 MG/DL (ref 8.5–10.1)
CHLORIDE SERPL-SCNC: 108 MMOL/L (ref 97–108)
CO2 SERPL-SCNC: 27 MMOL/L (ref 21–32)
CREAT SERPL-MCNC: 0.74 MG/DL (ref 0.55–1.02)
DIFFERENTIAL METHOD BLD: ABNORMAL
EOSINOPHIL # BLD: 0 K/UL (ref 0–0.4)
EOSINOPHIL NFR BLD: 0 % (ref 0–7)
ERYTHROCYTE [DISTWIDTH] IN BLOOD BY AUTOMATED COUNT: 14.2 % (ref 11.5–14.5)
GLUCOSE SERPL-MCNC: 194 MG/DL (ref 65–100)
HCT VFR BLD AUTO: 34.7 % (ref 35–47)
HERPES SIMPLEX VIRUS, CSF, UHSPT: NORMAL
HGB BLD-MCNC: 11 G/DL (ref 11.5–16)
LYMPHOCYTES # BLD AUTO: 10 % (ref 12–49)
LYMPHOCYTES # BLD: 0.6 K/UL (ref 0.8–3.5)
MCH RBC QN AUTO: 27 PG (ref 26–34)
MCHC RBC AUTO-ENTMCNC: 31.7 G/DL (ref 30–36.5)
MCV RBC AUTO: 85 FL (ref 80–99)
MONOCYTES # BLD: 0.2 K/UL (ref 0–1)
MONOCYTES NFR BLD AUTO: 3 % (ref 5–13)
NEUTS SEG # BLD: 5.5 K/UL (ref 1.8–8)
NEUTS SEG NFR BLD AUTO: 87 % (ref 32–75)
PLATELET # BLD AUTO: 225 K/UL (ref 150–400)
POTASSIUM SERPL-SCNC: 4.1 MMOL/L (ref 3.5–5.1)
RBC # BLD AUTO: 4.08 M/UL (ref 3.8–5.2)
RBC MORPH BLD: ABNORMAL
SODIUM SERPL-SCNC: 145 MMOL/L (ref 136–145)
WBC # BLD AUTO: 6.3 K/UL (ref 3.6–11)

## 2017-03-12 PROCEDURE — 74011250636 HC RX REV CODE- 250/636: Performed by: EMERGENCY MEDICINE

## 2017-03-12 PROCEDURE — 74011250637 HC RX REV CODE- 250/637: Performed by: INTERNAL MEDICINE

## 2017-03-12 PROCEDURE — 74011250636 HC RX REV CODE- 250/636: Performed by: PSYCHIATRY & NEUROLOGY

## 2017-03-12 PROCEDURE — 74011000258 HC RX REV CODE- 258: Performed by: INTERNAL MEDICINE

## 2017-03-12 PROCEDURE — 74011250636 HC RX REV CODE- 250/636: Performed by: HOSPITALIST

## 2017-03-12 PROCEDURE — 74011000250 HC RX REV CODE- 250: Performed by: INTERNAL MEDICINE

## 2017-03-12 PROCEDURE — 80048 BASIC METABOLIC PNL TOTAL CA: CPT | Performed by: INTERNAL MEDICINE

## 2017-03-12 PROCEDURE — 36415 COLL VENOUS BLD VENIPUNCTURE: CPT | Performed by: INTERNAL MEDICINE

## 2017-03-12 PROCEDURE — 74011000258 HC RX REV CODE- 258: Performed by: EMERGENCY MEDICINE

## 2017-03-12 PROCEDURE — 74011250636 HC RX REV CODE- 250/636: Performed by: INTERNAL MEDICINE

## 2017-03-12 PROCEDURE — 65660000000 HC RM CCU STEPDOWN

## 2017-03-12 PROCEDURE — 85025 COMPLETE CBC W/AUTO DIFF WBC: CPT | Performed by: INTERNAL MEDICINE

## 2017-03-12 RX ORDER — METHYLPREDNISOLONE 4 MG/1
4 TABLET ORAL
Status: DISCONTINUED | OUTPATIENT
Start: 2017-03-17 | End: 2017-03-13 | Stop reason: HOSPADM

## 2017-03-12 RX ORDER — METHYLPREDNISOLONE 4 MG/1
4 TABLET ORAL
Status: DISCONTINUED | OUTPATIENT
Start: 2017-03-16 | End: 2017-03-13 | Stop reason: HOSPADM

## 2017-03-12 RX ORDER — FLUOXETINE HYDROCHLORIDE 20 MG/1
20 CAPSULE ORAL DAILY
Status: DISCONTINUED | OUTPATIENT
Start: 2017-03-12 | End: 2017-03-13 | Stop reason: HOSPADM

## 2017-03-12 RX ORDER — CYCLOBENZAPRINE HCL 10 MG
5 TABLET ORAL 3 TIMES DAILY
Status: DISCONTINUED | OUTPATIENT
Start: 2017-03-12 | End: 2017-03-13 | Stop reason: HOSPADM

## 2017-03-12 RX ORDER — MORPHINE SULFATE 2 MG/ML
2 INJECTION, SOLUTION INTRAMUSCULAR; INTRAVENOUS
Status: DISCONTINUED | OUTPATIENT
Start: 2017-03-12 | End: 2017-03-13 | Stop reason: HOSPADM

## 2017-03-12 RX ORDER — METHYLPREDNISOLONE 4 MG/1
4 TABLET ORAL ONCE
Status: COMPLETED | OUTPATIENT
Start: 2017-03-12 | End: 2017-03-12

## 2017-03-12 RX ORDER — METHYLPREDNISOLONE 4 MG/1
4 TABLET ORAL
Status: DISCONTINUED | OUTPATIENT
Start: 2017-03-15 | End: 2017-03-13 | Stop reason: HOSPADM

## 2017-03-12 RX ORDER — METHYLPREDNISOLONE 4 MG/1
8 TABLET ORAL ONCE
Status: COMPLETED | OUTPATIENT
Start: 2017-03-12 | End: 2017-03-12

## 2017-03-12 RX ORDER — METHYLPREDNISOLONE 4 MG/1
4 TABLET ORAL
Status: DISCONTINUED | OUTPATIENT
Start: 2017-03-14 | End: 2017-03-13 | Stop reason: HOSPADM

## 2017-03-12 RX ORDER — KETOROLAC TROMETHAMINE 30 MG/ML
30 INJECTION, SOLUTION INTRAMUSCULAR; INTRAVENOUS EVERY 6 HOURS
Status: DISCONTINUED | OUTPATIENT
Start: 2017-03-12 | End: 2017-03-12

## 2017-03-12 RX ORDER — METHYLPREDNISOLONE 4 MG/1
8 TABLET ORAL ONCE
Status: DISCONTINUED | OUTPATIENT
Start: 2017-03-13 | End: 2017-03-13 | Stop reason: HOSPADM

## 2017-03-12 RX ORDER — HYDROMORPHONE HYDROCHLORIDE 1 MG/ML
1 INJECTION, SOLUTION INTRAMUSCULAR; INTRAVENOUS; SUBCUTANEOUS
Status: DISCONTINUED | OUTPATIENT
Start: 2017-03-12 | End: 2017-03-13 | Stop reason: HOSPADM

## 2017-03-12 RX ORDER — METHYLPREDNISOLONE 4 MG/1
4 TABLET ORAL
Status: DISCONTINUED | OUTPATIENT
Start: 2017-03-13 | End: 2017-03-13 | Stop reason: HOSPADM

## 2017-03-12 RX ORDER — DIPHENHYDRAMINE HYDROCHLORIDE 50 MG/ML
25 INJECTION, SOLUTION INTRAMUSCULAR; INTRAVENOUS ONCE
Status: COMPLETED | OUTPATIENT
Start: 2017-03-12 | End: 2017-03-12

## 2017-03-12 RX ADMIN — ACYCLOVIR SODIUM 244 MG: 50 INJECTION, SOLUTION INTRAVENOUS at 11:13

## 2017-03-12 RX ADMIN — Medication 10 ML: at 22:09

## 2017-03-12 RX ADMIN — SODIUM CHLORIDE 125 ML/HR: 900 INJECTION, SOLUTION INTRAVENOUS at 21:56

## 2017-03-12 RX ADMIN — ACYCLOVIR SODIUM 244 MG: 50 INJECTION, SOLUTION INTRAVENOUS at 06:39

## 2017-03-12 RX ADMIN — CYCLOBENZAPRINE HYDROCHLORIDE 5 MG: 10 TABLET, FILM COATED ORAL at 15:05

## 2017-03-12 RX ADMIN — AMPICILLIN SODIUM 2 G: 2 INJECTION, POWDER, FOR SOLUTION INTRAMUSCULAR; INTRAVENOUS at 12:46

## 2017-03-12 RX ADMIN — METHYLPREDNISOLONE 4 MG: 4 TABLET ORAL at 16:26

## 2017-03-12 RX ADMIN — SODIUM CHLORIDE 10 MG: 9 INJECTION INTRAMUSCULAR; INTRAVENOUS; SUBCUTANEOUS at 15:07

## 2017-03-12 RX ADMIN — FLUOXETINE 20 MG: 20 CAPSULE ORAL at 11:41

## 2017-03-12 RX ADMIN — Medication 10 ML: at 14:53

## 2017-03-12 RX ADMIN — CEFTRIAXONE 2 G: 2 INJECTION, POWDER, FOR SOLUTION INTRAMUSCULAR; INTRAVENOUS at 15:02

## 2017-03-12 RX ADMIN — CEFTRIAXONE 2 G: 2 INJECTION, POWDER, FOR SOLUTION INTRAMUSCULAR; INTRAVENOUS at 01:57

## 2017-03-12 RX ADMIN — DEXAMETHASONE SODIUM PHOSPHATE 10 MG: 4 INJECTION, SOLUTION INTRAMUSCULAR; INTRAVENOUS at 01:00

## 2017-03-12 RX ADMIN — AMPICILLIN SODIUM 2 G: 2 INJECTION, POWDER, FOR SOLUTION INTRAMUSCULAR; INTRAVENOUS at 08:41

## 2017-03-12 RX ADMIN — ACETAMINOPHEN 650 MG: 325 TABLET, FILM COATED ORAL at 08:40

## 2017-03-12 RX ADMIN — METHYLPREDNISOLONE 8 MG: 4 TABLET ORAL at 21:56

## 2017-03-12 RX ADMIN — METHYLPREDNISOLONE 8 MG: 4 TABLET ORAL at 16:25

## 2017-03-12 RX ADMIN — DIPHENHYDRAMINE HYDROCHLORIDE 25 MG: 50 INJECTION, SOLUTION INTRAMUSCULAR; INTRAVENOUS at 15:06

## 2017-03-12 RX ADMIN — AMPICILLIN SODIUM 2 G: 2 INJECTION, POWDER, FOR SOLUTION INTRAMUSCULAR; INTRAVENOUS at 00:59

## 2017-03-12 RX ADMIN — Medication 10 ML: at 06:39

## 2017-03-12 RX ADMIN — DEXAMETHASONE SODIUM PHOSPHATE 10 MG: 4 INJECTION, SOLUTION INTRAMUSCULAR; INTRAVENOUS at 11:21

## 2017-03-12 RX ADMIN — DEXAMETHASONE SODIUM PHOSPHATE 10 MG: 4 INJECTION, SOLUTION INTRAMUSCULAR; INTRAVENOUS at 06:39

## 2017-03-12 RX ADMIN — VANCOMYCIN HYDROCHLORIDE 1000 MG: 1 INJECTION, POWDER, LYOPHILIZED, FOR SOLUTION INTRAVENOUS at 03:46

## 2017-03-12 RX ADMIN — Medication 2 MG: at 08:40

## 2017-03-12 RX ADMIN — LEVOTHYROXINE SODIUM 25 MCG: 25 TABLET ORAL at 06:39

## 2017-03-12 RX ADMIN — METHYLPREDNISOLONE 4 MG: 4 TABLET ORAL at 20:25

## 2017-03-12 RX ADMIN — HYDROMORPHONE HYDROCHLORIDE 1 MG: 1 INJECTION, SOLUTION INTRAMUSCULAR; INTRAVENOUS; SUBCUTANEOUS at 11:21

## 2017-03-12 RX ADMIN — CYCLOBENZAPRINE HYDROCHLORIDE 5 MG: 10 TABLET, FILM COATED ORAL at 21:56

## 2017-03-12 RX ADMIN — Medication 2 MG: at 03:04

## 2017-03-12 NOTE — PROGRESS NOTES
* No surgery found *  Bedside and Verbal shift change report given to Anjel Manley (oncoming nurse) by [facility] (offgoing nurse). Report included the following information SBAR, Kardex, Intake/Output, MAR and Recent Results. Zone Phone:   [phone]      Significant changes during shift:  Pt c/o headache 6/10. Prn motrin given with little to no relief. PRN IV morphine ordered. Pt sinus phi during the night in the mid 40's, pt asymptomatic. Patient Information    Kory Smalls  49 y.o.  3/11/2017 12:09 PM by Alfred Guevara MD. Kory Smalls was admitted from Home    Problem List    Patient Active Problem List    Diagnosis Date Noted    Headache 03/11/2017     Past Medical History:   Diagnosis Date    Spinal stenosis     Thyroid disease     hypothyroid         Core Measures:    CVA: No No  CHF:No No  PNA:No No      Activity Status:    OOB to Chair No  Ambulated this shift Yes   Bed Rest No    Supplemental O2: (If Applicable)    NC No  NRB No  Venti-mask No  On  Liters/min      LINES AND DRAINS:    Central Line? No Placement date  Reason Medically Necessary     PICC LINE? No Placement date Reason Medically Necessary     Urinary Catheter? No Placement Date  Reason Medically Necessary     DVT prophylaxis:    DVT prophylaxis Med- No  DVT prophylaxis SCD or JOYCE- No     Wounds: (If Applicable)    Wounds- No    Location     Patient Safety:    Falls Score Total Score: 1  Safety Level_______  Bed Alarm On? No  Sitter? No    Plan for upcoming shift: IV antibiotics, IV fluids, IV steroids.          Discharge Plan: No     Active Consults:  IP CONSULT TO HOSPITALIST

## 2017-03-12 NOTE — PROGRESS NOTES
System downtime was enacted for one hour to accomodate the ending of Daylight Saving Time at 2:00 a.m. Clinical documentation has been captured on paper to be scanned into the system. Medications administered during this time have been entered when the system became available.

## 2017-03-12 NOTE — PROGRESS NOTES
No surgery found *  Bedside and Verbal shift change report given to Phillip Woods R.N  (oncoming nurse) by Tino Anderson R.N (offgoing nurse). Report included the following information SBAR, Kardex, Intake/Output, MAR and Recent Results. Sinus Terrance     Zone Phone: [phone]        Significant changes during shift:  HR sustained to 40-46 ,drop to 38-39 briefly,pt.is asymptomatic.           Patient Information     Wilfredo Virk  49 y.o.  3/11/2017 12:09 PM by Nader Luong MD. Wilfredo Virk was admitted from Home     Problem List          Patient Active Problem List     Diagnosis Date Noted    Headache 03/11/2017           Past Medical History:   Diagnosis Date    Spinal stenosis      Thyroid disease       hypothyroid            Core Measures:     CVA: No No  CHF:No No  PNA:No No        Activity Status:     OOB to Chair No  Ambulated this shift Yes   Bed Rest No     Supplemental O2: (If Applicable)     NC No  NRB No  Venti-mask No  On Liters/min        LINES AND DRAINS:     Central Line? No Placement date Reason Medically Necessary      PICC LINE? No Placement date Reason Medically Necessary      Urinary Catheter? No Placement Date Reason Medically Necessary      DVT prophylaxis:     DVT prophylaxis Med- No  DVT prophylaxis SCD or JOYCE- No      Wounds: (If Applicable)     Wounds- No     Location      Patient Safety:     Falls Score Total Score: 1  Safety Level_______  Bed Alarm On? No  Sitter?  No     Plan for upcoming shift:  IV fluids, po steroids.            Discharge Plan: No      Active Consults:  IP CONSULT TO HOSPITALIST

## 2017-03-12 NOTE — PROGRESS NOTES
Hospitalist Progress Note    NAME: Gabbi Paniagua   :  1960   MRN:  [id number]   Hospital day:  1      Hospitalist: Regla Valdez MD       Assessment / Plan:  Intractable headache  -initially concern for meningitis however initial culture appears to be contaminant, repeat cell count normal, protein normal  -appreciate neurology eval, likely viral syndrome  -stopped all antibiotics  -continue steroids per neuro for pain  -started flexeril for tension like symptoms  -trial IV benadryl and compazine    Right internal carotid artery aneurysm  -4x5 mm on CTA  -will need neurosurgery follow up    Anxiety  Hypothyroidism   -cont home med      Code status: Full  Prophylaxis: SCD  Recommended Disposition: HOME     Subjective:     Chief Complaint: headache  Continues to have headache, dilaudid does help a little  Says it is mainly on the top the head over the neck worse with flexion of neck        Review of Systems:  Symptom Y/N Comments  Symptom Y/N Comments   Fever/Chills n   Chest Pain     Poor Appetite n   Edema     Cough    Abdominal Pain     Sputum    Joint Pain     SOB/MANN    Pruritis/Rash     Nausea/vomit    Tolerating PT/OT     Diarrhea    Tolerating Diet     Constipation    Other       Could NOT obtain due to:      Objective:     VITALS:   Last 24hrs VS reviewed since prior progress note.  Most recent are:  Patient Vitals for the past 24 hrs:   Temp Pulse Resp BP SpO2   17 1552 97.7 °F (36.5 °C) (!) 50 16 120/74 96 %   17 1120 97.4 °F (36.3 °C) (!) 47 18 174/88 96 %   17 0749 97.7 °F (36.5 °C) (!) 41 18 133/80 98 %   17 0431 97.5 °F (36.4 °C) (!) 45 18 118/66 98 %   17 0104 97.5 °F (36.4 °C) (!) 48 18 129/83 96 %   17 1947 97.7 °F (36.5 °C) 72 18 110/62 91 %   17 1830 - - - 122/70 93 %   17 1750 - - - 126/69 92 %   17 1700 - - - 94/70 95 %     No intake or output data in the 24 hours ending 17 1651     PHYSICAL EXAM:  General: Alert, cooperative, no acute distress    EENT:  EOMI. Anicteric sclerae. Mucous membranes moist  Resp:  CTA bilaterally, no wheezing or rales. No accessory muscle use  CV:  Regular rhythm, no edema  GI:  Soft, non distended, non tender. +Bowel sounds  Neurologic:  Alert and oriented X 3, normal speech  Psych:   Good insight, not anxious nor agitated  Skin:  No rashes, no jaundice  MSK:  Some tenderness over the back of neck  ________________________________________________________________________  Care Plan discussed with:    Comments   Patient x    Family      RN x    Care Manager     Consultant                        Multidiciplinary team rounds were held today with , nursing, pharmacist and clinical coordinator. Patient's plan of care was discussed; medications were reviewed and discharge planning was addressed. ________________________________________________________________________  Total NON critical care TIME:  20   Minutes  ________________________________________________________________________  Papo Huggins MD     Procedures: see electronic medical records for all procedures/Xrays and details which were not copied into this note but were reviewed prior to creation of Plan. LABS:  I reviewed today's most current labs and imaging studies.   Pertinent labs include:  Recent Labs      03/12/17   0111 03/11/17   1321   WBC  6.3  7.8   HGB  11.0*  11.0*   HCT  34.7*  33.9*   PLT  225  214     Recent Labs      03/12/17   0111  03/11/17   1321   NA  145  140   K  4.1  3.6   CL  108  104   CO2  27  28   GLU  194*  80   BUN  9  9   CREA  0.74  0.65   CA  7.2*  8.0*   ALB   --   2.8*   TBILI   --   0.3   SGOT   --   56*   ALT   --   88*       Signed: Papo Huggins MD

## 2017-03-12 NOTE — ROUTINE PROCESS
Bedside and Verbal shift change report given to [address] [place], [place] [address]Odin gregoryRN (oncoming nurse) by Titus Wilkes RN (offgoing nurse). Report included the following information SBAR, Kardex, Intake/Output, MAR and Recent Results.     Zone Phone: [phone]        Significant changes during shift: None           Patient Information     Kar Baez  49 y.o.  3/11/2017 12:09 PM by Gale Prieto MD. Kar Baez was admitted from Home     Problem List          Patient Active Problem List     Diagnosis Date Noted    Headache 03/11/2017           Past Medical History:   Diagnosis Date    Spinal stenosis      Thyroid disease       hypothyroid            Core Measures:     CVA: No No  CHF:No No  PNA:No No        Activity Status:     OOB to Chair No  Ambulated this shift Yes   Bed Rest No     Supplemental O2: (If Applicable)     NC No  NRB No  Venti-mask No  On Liters/min        LINES AND DRAINS:     Central Line? No Placement date Reason Medically Necessary      PICC LINE? No Placement date Reason Medically Necessary      Urinary Catheter? No Placement Date Reason Medically Necessary      DVT prophylaxis:     DVT prophylaxis Med- No  DVT prophylaxis SCD or JOYCE- No      Wounds: (If Applicable)     Wounds- No     Location      Patient Safety:     Falls Score Total Score: 1  Safety Level_______  Bed Alarm On? No  Sitter?  No     Plan for upcoming shift: None             Discharge Plan: No      Active Consults:  IP CONSULT TO HOSPITALIST

## 2017-03-12 NOTE — CONSULTS
IP CONSULT TO NEUROLOGY  Consult performed by: Kaye Cintron  Consult ordered by: [facility]  Reason for consult: headache            NEUROLOGY HISTORY AND PHYSICAL    Name Pham Vega Age 49 y.o. MRN [id number]  1960     Consulting Physician: Angel Paz MD      Chief Complaint:  Headache     This is a 49 y.o. right handed female with no significant medical history. Began feeling malaise and fatigue approximately 5 days ago. She developed a holocephalic headache with photophobia and nausea. She was seen in ED and underwent a spinal tap on the . This was bloody and contaminated with staph. She was called back and treated empirically for bacterial meningitis. A second tap was clean. Incidentally and a cavernous segment right ICA aneurysm was seen. I have personally reviewed the chart   I have personally reviewed available imaging   I have the reviewed the available laboratory results. Assessment and Plan     1. Acute intractable headache, unspecified headache type  Most likely unrelated to the aneurysm and more likely a viral syndrome. Symptoms are improving Start a medrol dose Lyndon and continue hydration. 2. Right internal carotid artery aneurysm  Discussed the aneurysm and the necessity of neurosurgery follow up. Discussed the lethality of aneurysmal rupture. 3. Hypothyroidism  Continue levoxyl    4.  Depression  Continue prozac      Patient Allergies    Percocet [oxycodone-acetaminophen]     Current Facility-Administered Medications   Medication Dose Route Frequency    HYDROmorphone (PF) (DILAUDID) injection 1 mg  1 mg IntraVENous Q6H PRN    morphine injection 2 mg  2 mg IntraVENous Q3H PRN    FLUoxetine (PROzac) capsule 20 mg  20 mg Oral DAILY    cyclobenzaprine (FLEXERIL) tablet 5 mg  5 mg Oral TID    methylPREDNISolone (MEDROL) tablet 8 mg  8 mg Oral ONCE    Followed by   [facility] methylPREDNISolone (MEDROL) tablet 4 mg  4 mg Oral ONCE    Followed by   [facility] methylPREDNISolone (MEDROL) tablet 4 mg  4 mg Oral ONCE    Followed by   Etienne Goncalves methylPREDNISolone (MEDROL) tablet 8 mg  8 mg Oral ONCE    Followed by   Pavithra Griffith ON 3/13/2017] methylPREDNISolone (MEDROL) tablet 4 mg  4 mg Oral TID WITH MEALS    Followed by   Pavithra Griffith ON 3/13/2017] methylPREDNISolone (MEDROL) tablet 8 mg  8 mg Oral ONCE    Followed by   Pavithra Griffith ON 3/14/2017] methylPREDNISolone (MEDROL) tablet 4 mg  4 mg Oral QID WITH MEALS    Followed by   Pavithra Griffith ON 3/15/2017] methylPREDNISolone (MEDROL) tablet 4 mg  4 mg Oral TID WITH MEALS    Followed by   Pavithra Griffith ON 3/16/2017] methylPREDNISolone (MEDROL) tablet 4 mg  4 mg Oral ACB/HS    Followed by   Pavithra Griffith ON 3/17/2017] methylPREDNISolone (MEDROL) tablet 4 mg  4 mg Oral ACB    sodium chloride (NS) flush 5-10 mL  5-10 mL IntraVENous Q8H    sodium chloride (NS) flush 5-10 mL  5-10 mL IntraVENous PRN    0.9% sodium chloride infusion  125 mL/hr IntraVENous CONTINUOUS    ondansetron (ZOFRAN) injection 4 mg  4 mg IntraVENous Q4H PRN    bisacodyl (DULCOLAX) tablet 5 mg  5 mg Oral DAILY PRN    levothyroxine (SYNTHROID) tablet 25 mcg  25 mcg Oral ACB    hydrALAZINE (APRESOLINE) 20 mg/mL injection 10 mg  10 mg IntraVENous Q6H PRN    acetaminophen (TYLENOL) tablet 650 mg  650 mg Oral Q6H PRN    influenza vaccine 2016-17 (36mos+)(PF) (FLUZONE/FLUARIX/FLULAVAL QUAD) injection 0.5 mL  0.5 mL IntraMUSCular PRIOR TO DISCHARGE       Past Medical History:   Diagnosis Date    Spinal stenosis     Thyroid disease     hypothyroid       Social History   Substance Use Topics    Smoking status: Never Smoker    Smokeless tobacco: Not on file    Alcohol use Yes      Comment: socially     Family History  No seizures  No aneurysm  No sudden death    Review of Systems   Constitutional: Negative for chills and fever. HENT: Positive for tinnitus. Negative for ear pain. Eyes: Positive for blurred vision and pain. Negative for discharge. Respiratory: Negative for cough and hemoptysis. Cardiovascular: Negative for chest pain and claudication. Gastrointestinal: Negative for constipation and diarrhea. Genitourinary: Negative for flank pain and hematuria. Musculoskeletal: Negative for back pain and myalgias. Skin: Negative for itching and rash. Neurological: Positive for dizziness and headaches. Endo/Heme/Allergies: Negative for environmental allergies. Does not bruise/bleed easily. Psychiatric/Behavioral: Positive for depression. Negative for hallucinations, substance abuse and suicidal ideas. Visit Vitals    /88     Pulse (!) 47    Temp 97.4 °F (36.3 °C)    Resp 18    Wt 122 lb 5.7 oz (55.5 kg)    LMP 04/22/2013    SpO2 96%    BMI 24.71 kg/m2      Neurologic Exam     Mental Status   Oriented to person. Oriented to place. Speech: speech is normal   Level of consciousness: alert  Knowledge: good. Cranial Nerves     CN II   Visual fields full to confrontation. CN III, IV, VI   Pupils are equal, round, and reactive to light. Extraocular motions are normal.     CN V   Facial sensation intact. CN VII   Facial expression full, symmetric. CN VIII   Hearing: intact    CN IX, X   Palate: symmetric  Right gag reflex: normal  Left gag reflex: normal    CN XII   Tongue: not atrophic  Fasciculations: absent  Tongue deviation: none    Motor Exam   Muscle bulk: normal  Overall muscle tone: normal    Strength   Strength 5/5 throughout. Sensory Exam   Light touch normal.   Vibration normal.   Proprioception normal.   Pinprick normal.     Gait, Coordination, and Reflexes     Gait  Gait: (deferred)    Coordination   Romberg: negative  Finger to nose coordination: normal    Tremor   Resting tremor: absent  Intention tremor: absent    Reflexes   Reflexes 2+ except as noted.        Imaging    CT Results (most recent):    Results from Hospital Encounter encounter on 03/08/17   CTA HEAD NECK W CONT    IMPRESSION:    5 x 4 mm right cavernous internal carotid artery aneurysm. No significant flow-limiting stenosis involving cervical carotid arteries. Patent vertebrobasilar system. No major large vessel intracranial vascular occlusive change. Recommend consult interventional neuroradiology.           Lab Review  Lab Results   Component Value Date/Time    WBC 6.3 03/12/2017 01:11 AM    HCT 34.7 03/12/2017 01:11 AM    HGB 11.0 03/12/2017 01:11 AM    PLATELET 964 24/25/4899 01:11 AM     Lab Results   Component Value Date/Time    Sodium 145 03/12/2017 01:11 AM    Potassium 4.1 03/12/2017 01:11 AM    Chloride 108 03/12/2017 01:11 AM    CO2 27 03/12/2017 01:11 AM    Glucose 194 03/12/2017 01:11 AM    BUN 9 03/12/2017 01:11 AM    Creatinine 0.74 03/12/2017 01:11 AM    Calcium 7.2 03/12/2017 01:11 AM

## 2017-03-13 VITALS
TEMPERATURE: 98.1 F | RESPIRATION RATE: 16 BRPM | BODY MASS INDEX: 24.71 KG/M2 | HEART RATE: 60 BPM | WEIGHT: 122.36 LBS | OXYGEN SATURATION: 95 % | SYSTOLIC BLOOD PRESSURE: 133 MMHG | DIASTOLIC BLOOD PRESSURE: 74 MMHG

## 2017-03-13 PROCEDURE — 90686 IIV4 VACC NO PRSV 0.5 ML IM: CPT | Performed by: INTERNAL MEDICINE

## 2017-03-13 PROCEDURE — 90471 IMMUNIZATION ADMIN: CPT

## 2017-03-13 PROCEDURE — 74011250636 HC RX REV CODE- 250/636: Performed by: INTERNAL MEDICINE

## 2017-03-13 PROCEDURE — 74011250637 HC RX REV CODE- 250/637: Performed by: INTERNAL MEDICINE

## 2017-03-13 PROCEDURE — 97161 PT EVAL LOW COMPLEX 20 MIN: CPT

## 2017-03-13 PROCEDURE — 74011250636 HC RX REV CODE- 250/636: Performed by: PSYCHIATRY & NEUROLOGY

## 2017-03-13 RX ORDER — CYCLOBENZAPRINE HCL 10 MG
5 TABLET ORAL
Qty: 30 TAB | Refills: 0 | Status: SHIPPED | OUTPATIENT
Start: 2017-03-13 | End: 2017-03-30

## 2017-03-13 RX ORDER — METHYLPREDNISOLONE 4 MG/1
TABLET ORAL
Qty: 1 DOSE PACK | Refills: 0 | Status: SHIPPED | OUTPATIENT
Start: 2017-03-13 | End: 2017-03-30

## 2017-03-13 RX ORDER — LORAZEPAM 1 MG/1
1 TABLET ORAL ONCE
Status: COMPLETED | OUTPATIENT
Start: 2017-03-13 | End: 2017-03-13

## 2017-03-13 RX ADMIN — Medication 10 ML: at 13:12

## 2017-03-13 RX ADMIN — Medication 10 ML: at 05:24

## 2017-03-13 RX ADMIN — METHYLPREDNISOLONE 4 MG: 4 TABLET ORAL at 13:12

## 2017-03-13 RX ADMIN — SODIUM CHLORIDE 125 ML/HR: 900 INJECTION, SOLUTION INTRAVENOUS at 05:24

## 2017-03-13 RX ADMIN — FLUOXETINE 20 MG: 20 CAPSULE ORAL at 08:49

## 2017-03-13 RX ADMIN — HYDRALAZINE HYDROCHLORIDE 10 MG: 20 INJECTION INTRAMUSCULAR; INTRAVENOUS at 09:40

## 2017-03-13 RX ADMIN — LEVOTHYROXINE SODIUM 25 MCG: 25 TABLET ORAL at 08:49

## 2017-03-13 RX ADMIN — LORAZEPAM 1 MG: 1 TABLET ORAL at 09:39

## 2017-03-13 RX ADMIN — INFLUENZA VIRUS VACCINE 0.5 ML: 15; 15; 15; 15 SUSPENSION INTRAMUSCULAR at 09:39

## 2017-03-13 RX ADMIN — CYCLOBENZAPRINE HYDROCHLORIDE 5 MG: 10 TABLET, FILM COATED ORAL at 08:49

## 2017-03-13 RX ADMIN — METHYLPREDNISOLONE 4 MG: 4 TABLET ORAL at 08:49

## 2017-03-13 NOTE — PROGRESS NOTES
Occupational Therapy    Acknowledge OT order and completed chart review. Discussed patient with nursing who cleared patient for therapy. Attempted OT eval.  Patient sleeping in bed and male visitor present politely requested defer at this time. Discussed with PT who reports patient was up ad jayson. Will follow up this afternoon as schedule allows.        Thank you,    Ajit Cruz OTR/L

## 2017-03-13 NOTE — PROGRESS NOTES
Patient blood pressure 184/88, heart rate of 40. Dr. Katie Heard notified. Dr. Katie Heard stated hydralazine would be fine. Patient also experiencing anxiety, orders given for 1 mg ativan PO now.

## 2017-03-13 NOTE — PROGRESS NOTES
Pt.'s HR sustained between 40-46 and drop to 38,pt.is asymptomatic and vital signs is stable. Notified Dr. Noemi Nielsen ( the on call hospitalist ). No order was made except to monitor pt.

## 2017-03-13 NOTE — PROGRESS NOTES
Discharge instructions provided to patient and family member. Instructions included AVS, prescriptions, and information regarding new medications. All questions answered. Patient to leave via volunteers.

## 2017-03-13 NOTE — ROUTINE PROCESS
Bedside and Verbal shift change report given to TRAVIS Luna (oncoming nurse) by Esha Hernandez RN (offgoing nurse). Report included the following information SBAR, Kardex, Intake/Output, MAR and Recent Results.     Zone Phone: [phone]        Significant changes during shift: None           Patient Information     Amelia Benson  49 y.o.  3/11/2017 12:09 PM by Alona Fritz MD. Amelia Benson was admitted from Home     Problem List          Patient Active Problem List     Diagnosis Date Noted    Headache 03/11/2017           Past Medical History:   Diagnosis Date    Spinal stenosis      Thyroid disease       hypothyroid            Core Measures:     CVA: No No  CHF:No No  PNA:No No        Activity Status:     OOB to Chair No  Ambulated this shift Yes   Bed Rest No     Supplemental O2: (If Applicable)     NC No  NRB No  Venti-mask No  On Liters/min        LINES AND DRAINS:     Central Line? No Placement date Reason Medically Necessary      PICC LINE? No Placement date Reason Medically Necessary      Urinary Catheter? No Placement Date Reason Medically Necessary      DVT prophylaxis:     DVT prophylaxis Med- No  DVT prophylaxis SCD or JOYCE- No      Wounds: (If Applicable)     Wounds- No     Location      Patient Safety:     Falls Score Total Score: 1  Safety Level_______  Bed Alarm On? No  Sitter?  No     Plan for upcoming shift: None             Discharge Plan: No      Active Consults:  IP CONSULT TO HOSPITALIST

## 2017-03-13 NOTE — PROGRESS NOTES
physical Therapy EVALUATION/DISCHARGE  Patient: Dayna Morales (66 y.o. female)  Date: 3/13/2017  Primary Diagnosis: Headache        Precautions:      ASSESSMENT :  Based on the objective data described below, the patient presents with baseline level of functional mobility at this time. Patient demonstrates no concerns with mobility as noted below. She denies HA at this time an no pain with flexion of cervical spine. No further PT needs or concerns; encouraged patient to mobilize at least 3 times daily throughout remainder of admission in order to maintain current strength and functioning with RN notified of such - she is safe to be up ad jayson. Skilled physical therapy is not indicated at this time. PLAN :  Discharge Recommendations: None  Further Equipment Recommendations for Discharge: none     SUBJECTIVE:   Patient stated do you mountain bike too?     OBJECTIVE DATA SUMMARY:   HISTORY:    Past Medical History:   Diagnosis Date    Spinal stenosis     Thyroid disease     hypothyroid     Past Surgical History:   Procedure Laterality Date    HX ORTHOPAEDIC  2002    ACL repair, pt unsure which side    HX ORTHOPAEDIC  2013    left shoulder surgery    ND COLONOSCOPY FLX DX W/COLLJ SPEC WHEN PFRMD  5/6/2013          Prior Level of Function/Home Situation: Independent, enjoys mountain biking, no falls, living with spouse. Personal factors and/or comorbidities impacting plan of care:     Home Situation  Home Environment: Private residence  One/Two Story Residence: One story  Living Alone: No  Support Systems: Family member(s), Friends \ neighbors, Spouse/Significant Other/Partner  Patient Expects to be Discharged to[de-identified] Private residence  Current DME Used/Available at Home: None    EXAMINATION/PRESENTATION/DECISION MAKING:   Critical Behavior:  Neurologic State: Alert  Orientation Level: Oriented X4  Cognition: Appropriate decision making  Safety/Judgement: Awareness of environment  Hearing:   Auditory  Auditory Impairment: None  Range Of Motion:  AROM: Within functional limits                       Strength:    Strength: Within functional limits                    Tone & Sensation:   Tone: Normal              Sensation: Intact               Coordination:  Coordination: Within functional limits  Vision: WNL      Functional Mobility:  Bed Mobility:  Rolling: Independent  Supine to Sit: Independent     Scooting: Independent  Transfers:  Sit to Stand: Independent  Stand to Sit: Independent                       Balance:   Sitting: Intact; Without support  Standing: Intact; Without support  Ambulation/Gait Training:  Distance (ft): 300 Feet (ft)  Assistive Device: Gait belt  Ambulation - Level of Assistance: Independent     Gait Description (WDL): Exceptions to WDL                                           No concerns. Stairs:  Number of Stairs Trained: 12  Stairs - Level of Assistance: Independent   Rail Use: Right  (step through)     Functional Measure:  Barthel Index:    Bathin  Bladder: 10  Bowels: 10  Groomin  Dressing: 10  Feeding: 10  Mobility: 15  Stairs: 10  Toilet Use: 10  Transfer (Bed to Chair and Back): 15  Total: 100       Barthel and G-code impairment scale:  Percentage of impairment CH  0% CI  1-19% CJ  20-39% CK  40-59% CL  60-79% CM  80-99% CN  100%   Barthel Score 0-100 100 99-80 79-60 59-40 20-39 1-19   0   Barthel Score 0-20 20 17-19 13-16 9-12 5-8 1-4 0      The Barthel ADL Index: Guidelines  1. The index should be used as a record of what a patient does, not as a record of what a patient could do. 2. The main aim is to establish degree of independence from any help, physical or verbal, however minor and for whatever reason. 3. The need for supervision renders the patient not independent. 4. A patient's performance should be established using the best available evidence.  Asking the patient, friends/relatives and nurses are the usual sources, but direct observation and common sense are also important. However direct testing is not needed. 5. Usually the patient's performance over the preceding 24-48 hours is important, but occasionally longer periods will be relevant. 6. Middle categories imply that the patient supplies over 50 per cent of the effort. 7. Use of aids to be independent is allowed. Evin Maher., Barthel, D.W. (8848). Functional evaluation: the Barthel Index. [facility] (14)2. BAM Price, Netta Hernandez., [place] Tang., [facility] Pencil, [facility] (1999). Measuring the change indisability after inpatient rehabilitation; comparison of the responsiveness of the Barthel Index and Functional [place] Measure. Journal of Neurology, Neurosurgery, and Psychiatry, 66(4), 834-357. Bri Valenzuela, JAYLON, JUDE Damian, & Serena English M.A. (2004.) Assessment of post-stroke quality of life in cost-effectiveness studies: The usefulness of the Barthel Index and the EuroQoL-5D. Quality of Life Research, 13, 973-25         G codes: In compliance with CMSs Claims Based Outcome Reporting, the following G-code set was chosen for this patient based on their primary functional limitation being treated: The outcome measure chosen to determine the severity of the functional limitation was the Barthel with a score of 100/100 which was correlated with the impairment scale. ? Mobility - Walking and Moving Around:     - CURRENT STATUS: CH - 0% impaired, limited or restricted    - GOAL STATUS: CH - 0% impaired, limited or restricted    - D/C STATUS:  CH - 0% impaired, limited or restricted   Activity Tolerance: WNL    Please refer to the flowsheet for vital signs taken during this treatment.   After treatment:   [x]   Patient left in no apparent distress sitting up in chair  []   Patient left in no apparent distress in bed  [x]   Call bell left within reach  [x]   Nursing notified  [x]   Caregiver present  []   Bed alarm activated    COMMUNICATION/EDUCATION: Communication/Collaboration:  [x]   Fall prevention education was provided and the patient/caregiver indicated understanding. [x]   Patient/family have participated as able and agree with findings and recommendations. []   Patient is unable to participate in plan of care at this time.   Findings and recommendations were discussed with: Occupational Therapist    Thank you for this referral.  Asim Montgomery, PT, DPT    Time Calculation: 14 mins

## 2017-03-16 ENCOUNTER — HOSPITAL ENCOUNTER (OUTPATIENT)
Dept: MAMMOGRAPHY | Age: 57
Discharge: HOME | End: 2017-03-16
Attending: FAMILY MEDICINE
Payer: COMMERCIAL

## 2017-03-16 ENCOUNTER — TELEPHONE (OUTPATIENT)
Dept: NEUROLOGY | Age: 57
End: 2017-03-16

## 2017-03-16 DIAGNOSIS — Z12.31 VISIT FOR SCREENING MAMMOGRAM: ICD-10-CM

## 2017-03-16 LAB
BACTERIA SPEC CULT: ABNORMAL
BACTERIA SPEC CULT: NORMAL
GRAM STN SPEC: ABNORMAL
GRAM STN SPEC: ABNORMAL
SERVICE CMNT-IMP: ABNORMAL
SERVICE CMNT-IMP: NORMAL

## 2017-03-16 PROCEDURE — 77067 SCR MAMMO BI INCL CAD: CPT

## 2017-03-16 NOTE — TELEPHONE ENCOUNTER
Called the patient advised that [facility] didn't have any other appointments available;    Patient also wanted to know if she would be able to fly, or do any other strenuous activity since she has the aneurysm. She is going on vacation and is very concerned.  Advised would send a message to Dr Kasandra Yadav since he is on call in the hospital

## 2017-03-16 NOTE — TELEPHONE ENCOUNTER
Patient was seen recenly in the hospital by Arminda Pearson, she is now having very painful headaches and she would like to know if it would be ok for her to fly on an airplane.  Patient is requesting a sooner apptmt with that [facility] for March 30th

## 2017-03-18 LAB
BACTERIA SPEC CULT: NORMAL
GRAM STN SPEC: NORMAL
GRAM STN SPEC: NORMAL
SERVICE CMNT-IMP: NORMAL

## 2017-03-20 NOTE — TELEPHONE ENCOUNTER
Patient was called and  was informed   There is no clear evidence of reported aneurysm rupture in air flight. Hence it may be assumed that there is no relation between intracranial aneurysm rupture and air travel, or its occurrence is extremely rare. However, there is a theoretical risk and based on this information she may decide wether the benefit outweighs the risk.

## 2017-03-21 NOTE — DISCHARGE SUMMARY
Hospitalist Discharge Summary     Patient ID:  Annette Dupree  [id number]  49 y.o.  1960    PCP on record: Demetrius Rubin MD    Admit date: 3/11/2017  Discharge date: 3/13/2017      DISCHARGE DIAGNOSIS:    Intractable headache, right internal carotid artery aneurysm, anxiety hypothyoridism      CONSULTATIONS:  IP CONSULT TO NEUROLOGY    Excerpted HPI from H&P of Sandy Coulter MD:  Annette Dupree is a 49 y.o.  female w p hx significant for hypothyroidism, anxiety, spinal stenosis present to ED c/o of worsening HA associated with 8/10 in severity, photophobia, neck pain with any movement, nausea and poor appetite for ~4 days. Per hx and chart review, pt presented to ED on 3/8 for similar symptoms, had LP done then and was sent home on pain meds. She presented to [facility] ED the next day for similar complaints and was also sent home to continue pain meds for headache. Patient states since last ER visit, she had remained debilitated, in bed with poor appetite. She states her HA is throbbing, lights and movment of neck makes HA worst. No relief with pain meds. She was called today to return to ED due to gram + cocci on csf culture. Patient denies any known fevers, cp, sob, palpitations, n/v/d. In the ED, vitals stable. cxr neg. CT head/CTA head and neck reviewed. ______________________________________________________________________  DISCHARGE SUMMARY/HOSPITAL COURSE:  for full details see H&P, daily progress notes, labs, consult notes.      Intractable headache  -initially concern for meningitis however initial culture appears to be contaminant, repeat cell count normal, protein normal  -appreciate neurology eval, likely viral syndrome  -stopped all antibiotics  -continue steroids per neuro for pain, medrol ruben  -f/u with neurology if symptoms perisist to eval      Right internal carotid artery aneurysm  -4x5 mm on CTA  -will need neurosurgery follow up     Anxiety  Hypothyroidism   -cont home med        _______________________________________________________________________  Patient seen and examined by me on discharge day. Pertinent Findings:  Gen:    Not in distress  Chest: Clear lungs  CVS:   Regular rhythm. No edema  Abd:  Soft, not distended, not tender  Neuro:  Alert, oriented, speech normal  _______________________________________________________________________  DISCHARGE MEDICATIONS:   Discharge Medication List as of 3/13/2017  1:28 PM      START taking these medications    Details   cyclobenzaprine (FLEXERIL) 10 mg tablet Take 0.5 Tabs by mouth three (3) times daily as needed for Muscle Spasm(s). , Print, Disp-30 Tab, R-0      methylPREDNISolone (MEDROL DOSEPACK) 4 mg tablet Take 1 tablets 3 times a day for 2 days, then take 1 tablet 2 times a day for 2 days, then take 1 tablet daily for 2 days then stop, Print, Disp-1 Dose Pack, R-0         CONTINUE these medications which have NOT CHANGED    Details   FLUoxetine (PROZAC) 10 mg tablet Take 20 mg by mouth daily. , Historical Med      !! dextroamphetamine-amphetamine (ADDERALL) 30 mg tablet Take 30 mg by mouth daily. Dextroamphetamine-amphetamine 30 mg tab administration instructions: Take one tab orally every morning and 1/2 tab (15 mg) every afternoon), Historical Med      !! dextroamphetamine-amphetamine (ADDERALL) 30 mg tablet Take 15 mg by mouth daily (with lunch). Dextroamphetamine-amphetamine 30 mg tab administration instructions: Take one tab orally every morning and 1/2 tab (15 mg) every afternoon), Historical Med      ibuprofen (MOTRIN) 200 mg tablet Take 600 mg by mouth every twelve (12) hours as needed for Pain., Historical Med      temazepam (RESTORIL) 30 mg capsule Take  by mouth nightly as needed for Sleep., Historical Med      ALPRAZolam (XANAX XR) 2 mg XR tablet Take 2 mg by mouth every morning., Historical Med      levothyroxine (SYNTHROID) 25 mcg tablet Take 25 mcg by mouth Daily (before breakfast). , Historical Med !! - Potential duplicate medications found. Please discuss with provider. STOP taking these medications       butalbital-acetaminophen-caff (FIORICET) -40 mg per capsule Comments:   Reason for Stopping:         traMADol (ULTRAM) 50 mg tablet Comments:   Reason for Stopping:               My Recommended Diet, Activity, Wound Care, and follow-up labs are listed in the patient's Discharge Insturctions which I have personally completed and reviewed.     _______________________________________________________________________  DISPOSITION:    Home with Family:    Home with HH/PT/OT/RN:    SNF/LTC:    ALEJO:    OTHER:        Condition at Discharge:  Stable  _______________________________________________________________________  Follow up with:   PCP : Demetrius Rubin MD  Follow-up Information     Follow up With Details Comments Contact Info    Demetrius Rubin MD   Patient can only remember the practice name and not the physician                Total time in minutes spent coordinating this discharge (includes going over instructions, follow-up, prescriptions, and preparing report for sign off to her PCP) :  35 minutes    Signed:  Mayuri Nur MD

## 2017-03-30 ENCOUNTER — OFFICE VISIT (OUTPATIENT)
Dept: NEUROLOGY | Age: 57
End: 2017-03-30

## 2017-03-30 VITALS
BODY MASS INDEX: 24.39 KG/M2 | HEIGHT: 59 IN | HEART RATE: 69 BPM | OXYGEN SATURATION: 98 % | DIASTOLIC BLOOD PRESSURE: 64 MMHG | WEIGHT: 121 LBS | SYSTOLIC BLOOD PRESSURE: 100 MMHG

## 2017-03-30 DIAGNOSIS — I72.0 CAROTID ANEURYSM, RIGHT (HCC): ICD-10-CM

## 2017-03-30 DIAGNOSIS — R41.3 MEMORY LOSS: ICD-10-CM

## 2017-03-30 DIAGNOSIS — R51.9 HEADACHE, CHRONIC DAILY: Primary | ICD-10-CM

## 2017-03-30 RX ORDER — TEMAZEPAM 30 MG/1
CAPSULE ORAL
Refills: 1 | COMMUNITY
Start: 2017-03-14 | End: 2018-04-16

## 2017-03-30 RX ORDER — ALPRAZOLAM 1 MG/1
TABLET ORAL
Refills: 1 | COMMUNITY
Start: 2017-02-21 | End: 2018-08-02

## 2017-03-30 RX ORDER — AMITRIPTYLINE HYDROCHLORIDE 25 MG/1
25 TABLET, FILM COATED ORAL
Qty: 30 TAB | Refills: 5 | Status: SHIPPED | OUTPATIENT
Start: 2017-03-30 | End: 2017-09-07 | Stop reason: SDUPTHER

## 2017-03-30 NOTE — MR AVS SNAPSHOT
Visit Information Date & Time Provider Department Dept. Phone Encounter #  
 3/30/2017  8:00 AM Neha Grant NP [facility] [phone] [id number] Follow-up Instructions Return for after neuropsych. Upcoming Health Maintenance Date Due Hepatitis C Screening 1960 DTaP/Tdap/Td series (1 - Tdap) 12/11/1983 PAP AKA CERVICAL CYTOLOGY 12/11/1983 FOBT Q 1 YEAR AGE 50-75 12/11/2012 BREAST CANCER SCRN MAMMOGRAM 3/16/2019 Allergies as of 3/30/2017  Review Complete On: 3/30/2017 By: Nelson Sandy LPN Severity Noted Reaction Type Reactions Percocet [Oxycodone-acetaminophen]  03/04/2013    Itching Current Immunizations  Never Reviewed Name Date Influenza Vaccine (Quad) PF 3/13/2017  9:39 AM  
  
 Not reviewed this visit You Were Diagnosed With   
  
 Codes Comments Headache, chronic daily    -  Primary ICD-10-CM: R51 ICD-9-CM: 784.0 Carotid aneurysm, right (HCC)     ICD-10-CM: I72.0 ICD-9-CM: 442.81 Memory loss     ICD-10-CM: R41.3 ICD-9-CM: 780.93 Vitals BP Pulse Height(growth percentile) Weight(growth percentile) LMP SpO2  
 100/64 69 4' 11\" (1.499 m) 121 lb (54.9 kg) 04/22/2013 98% BMI OB Status Smoking Status 24.44 kg/m2 Postmenopausal Never Smoker Vitals History BMI and BSA Data Body Mass Index Body Surface Area  
 24.44 kg/m 2 1.51 m 2 Preferred Pharmacy Pharmacy Name Phone [id number] [phone] Your Updated Medication List  
  
   
This list is accurate as of: 3/30/17  8:49 AM.  Always use your most recent med list.  
  
  
  
  
 ALPRAZolam 1 mg tablet Commonly known as:  XANAX  
TAKE 1/2-1 TABLET ORALLY DAILY AS NEEDED  
  
 amitriptyline 25 mg tablet Commonly known as:  ELAVIL Take 1 Tab by mouth nightly. dextroamphetamine-amphetamine 30 mg tablet Commonly known as:  ADDERALL Take 30 mg by mouth daily. Dextroamphetamine-amphetamine 30 mg tab administration instructions: Take one tab orally every morning and 1/2 tab (15 mg) every afternoon) FLUoxetine 10 mg tablet Commonly known as:  PROzac Take 20 mg by mouth daily. ibuprofen 200 mg tablet Commonly known as:  MOTRIN Take 600 mg by mouth every twelve (12) hours as needed for Pain.  
  
 levothyroxine 25 mcg tablet Commonly known as:  SYNTHROID Take 25 mcg by mouth Daily (before breakfast). temazepam 30 mg capsule Commonly known as:  RESTORIL  
TAKE ONE CAPSULE BY MOUTH AT BEDTIME AS NEEDED FOR SLEEP Prescriptions Sent to Pharmacy Refills  
 amitriptyline (ELAVIL) 25 mg tablet 5 Sig: Take 1 Tab by mouth nightly. Class: Normal  
 Pharmacy: [facility][id number][address]  #: [phone] Route: Oral  
  
We Performed the Following REFERRAL TO INTERVENTIONAL RADIOLOGY [VZN77 Custom] REFERRAL TO NEUROPSYCHOLOGY [HIC44 Custom] Comments:  
 Memory testing, h/o concussion, r/o dementia, pseudodementia, adderall [facility]: 
Vera Pickett PHD   
Address: [address] [place] [address] [address] [place] [address] [phone] Follow-up Instructions Return for after neuropsych. Referral Information Referral ID Referred By Referred To  
  
 [id number] Arelis Qiu Department of Radiology [address] [place] [address], [address] [place] [address] Visits Status Start Date End Date 1 New Request 3/30/17 3/30/18 If your referral has a status of pending review or denied, additional information will be sent to support the outcome of this decision. Referral ID Referred By Referred To  
 [id number] Sammye Smoke V Not Available Visits Status Start Date End Date 1 New Request 3/30/17 3/30/18 If your referral has a status of pending review or denied, additional information will be sent to support the outcome of this decision. Patient Instructions PRESCRIPTION REFILL POLICY [facility] Statement to Patients April 1, 2014 In an effort to ensure the large volume of patient prescription refills is processed in the most efficient and expeditious manner, we are asking our patients to assist us by calling your Pharmacy for all prescription refills, this will include also your  Mail Order Pharmacy. The pharmacy will contact our office electronically to continue the refill process. Please do not wait until the last minute to call your pharmacy. We need at least 48 hours (2days) to fill prescriptions. We also encourage you to call your pharmacy before going to  your prescription to make sure it is ready. With regard to controlled substance prescription refill requests (narcotic refills) that need to be picked up at our office, we ask your cooperation by providing us with at least 72 hours (3days) notice that you will need a refill. We will not refill narcotic prescription refill requests after 4:00pm on any weekday, Monday through Thursday, or after 2:00pm on Fridays, or on the weekends. We encourage everyone to explore another way of getting your prescription refill request processed using [employer], our patient web portal through our electronic medical record system. [employer] is an efficient and effective way to communicate your medication request directly to the office and  downloadable as an esperanza on your smart phone . [employer] also features a review functionality that allows you to view your medication list as well as leave messages for your physician. Are you ready to get connected? If so please review the attatched instructions or speak to any of our staff to get you set up right away! Thank you so much for your cooperation. Should you have any questions please contact our Practice Administrator. The Physicians and Staff,  [facility] A Healthy Lifestyle: Care Instructions Your Care Instructions A healthy lifestyle can help you feel good, stay at a healthy weight, and have plenty of energy for both work and play. A healthy lifestyle is something you can share with your whole family. A healthy lifestyle also can lower your risk for serious health problems, such as high blood pressure, heart disease, and diabetes. You can follow a few steps listed below to improve your health and the health of your family. Follow-up care is a key part of your treatment and safety. Be sure to make and go to all appointments, and call your doctor if you are having problems. Its also a good idea to know your test results and keep a list of the medicines you take. How can you care for yourself at home? · Do not eat too much sugar, fat, or fast foods. You can still have dessert and treats now and then. The goal is moderation. · Start small to improve your eating habits. Pay attention to portion sizes, drink less juice and soda pop, and eat more fruits and vegetables. ¨ Eat a healthy amount of food. A 3-ounce serving of meat, for example, is about the size of a deck of cards. Fill the rest of your plate with vegetables and whole grains. ¨ Limit the amount of soda and sports drinks you have every day. Drink more water when you are thirsty. ¨ Eat at least 5 servings of fruits and vegetables every day. It may seem like a lot, but it is not hard to reach this goal. A serving or helping is 1 piece of fruit, 1 cup of vegetables, or 2 cups of leafy, raw vegetables. Have an apple or some carrot sticks as an afternoon snack instead of a candy bar. Try to have fruits and/or vegetables at every meal. 
· Make exercise part of your daily routine.  You may want to start with simple activities, such as walking, bicycling, or slow swimming. Try to be active 30 to 60 minutes every day. You do not need to do all 30 to 60 minutes all at once. For example, you can exercise 3 times a day for 10 or 20 minutes. Moderate exercise is safe for most people, but it is always a good idea to talk to your doctor before starting an exercise program. 
· Keep moving. Carina Mottas the lawn, work in the garden, or [employer]. Take the stairs instead of the elevator at work. · If you smoke, quit. People who smoke have an increased risk for heart attack, stroke, cancer, and other lung illnesses. Quitting is hard, but there are ways to boost your chance of quitting tobacco for good. ¨ Use nicotine gum, patches, or lozenges. ¨ Ask your doctor about stop-smoking programs and medicines. ¨ Keep trying. In addition to reducing your risk of diseases in the future, you will notice some benefits soon after you stop using tobacco. If you have shortness of breath or asthma symptoms, they will likely get better within a few weeks after you quit. · Limit how much alcohol you drink. Moderate amounts of alcohol (up to 2 drinks a day for men, 1 drink a day for women) are okay. But drinking too much can lead to liver problems, high blood pressure, and other health problems. Family health If you have a family, there are many things you can do together to improve your health. · Eat meals together as a family as often as possible. · Eat healthy foods. This includes fruits, vegetables, lean meats and dairy, and whole grains. · Include your family in your fitness plan. Most people think of activities such as jogging or tennis as the way to fitness, but there are many ways you and your family can be more active. Anything that makes you breathe hard and gets your heart pumping is exercise. Here are some tips: 
¨ Walk to do errands or to take your child to school or the bus. ¨ Go for a family bike ride after dinner instead of watching TV. Where can you learn more? Go to http://andrew-rocky.info/. Enter U179 in the search box to learn more about \"A Healthy Lifestyle: Care Instructions. \" Current as of: July 26, 2016 Content Version: 11.2 © 3560-2833 [employer]. Care instructions adapted under license by [employer] (which disclaims liability or warranty for this information). If you have questions about a medical condition or this instruction, always ask your healthcare professional. [address] any warranty or liability for your use of this information. Introducing [facility]! Dear Randolph Warner 0546: 
Thank you for requesting a [employer] account. Our records indicate that you already have an active [employer] account. You can access your account anytime at [email or web address] Did you know that you can access your hospital and ER discharge instructions at any time in [employer]? You can also review all of your test results from your hospital stay or ER visit. Additional Information If you have questions, please visit the Frequently Asked Questions section of the [employer] website at [email or web address] Remember, [employer] is NOT to be used for urgent needs. For medical emergencies, dial 911. Now available from your iPhone and Android! Please provide this summary of care documentation to your next provider. Your primary care clinician is listed as Phys Other. If you have any questions after today's visit, please call [phone].

## 2017-03-30 NOTE — PATIENT INSTRUCTIONS
[facility]   Statement to Patients  April 1, 2014      In an effort to ensure the large volume of patient prescription refills is processed in the most efficient and expeditious manner, we are asking our patients to assist us by calling your Pharmacy for all prescription refills, this will include also your  Mail Order Pharmacy. The pharmacy will contact our office electronically to continue the refill process. Please do not wait until the last minute to call your pharmacy. We need at least 48 hours (2days) to fill prescriptions. We also encourage you to call your pharmacy before going to  your prescription to make sure it is ready. With regard to controlled substance prescription refill requests (narcotic refills) that need to be picked up at our office, we ask your cooperation by providing us with at least 72 hours (3days) notice that you will need a refill. We will not refill narcotic prescription refill requests after 4:00pm on any weekday, Monday through Thursday, or after 2:00pm on Fridays, or on the weekends. We encourage everyone to explore another way of getting your prescription refill request processed using [employer], our patient web portal through our electronic medical record system. [employer] is an efficient and effective way to communicate your medication request directly to the office and  downloadable as an esperanza on your smart phone . [employer] also features a review functionality that allows you to view your medication list as well as leave messages for your physician. Are you ready to get connected? If so please review the attatched instructions or speak to any of our staff to get you set up right away! Thank you so much for your cooperation. Should you have any questions please contact our Practice Administrator.     The Physicians and Staff,  [facility]          A Healthy Lifestyle: Care Instructions  Your Care Instructions  A healthy lifestyle can help you feel good, stay at a healthy weight, and have plenty of energy for both work and play. A healthy lifestyle is something you can share with your whole family. A healthy lifestyle also can lower your risk for serious health problems, such as high blood pressure, heart disease, and diabetes. You can follow a few steps listed below to improve your health and the health of your family. Follow-up care is a key part of your treatment and safety. Be sure to make and go to all appointments, and call your doctor if you are having problems. Its also a good idea to know your test results and keep a list of the medicines you take. How can you care for yourself at home? · Do not eat too much sugar, fat, or fast foods. You can still have dessert and treats now and then. The goal is moderation. · Start small to improve your eating habits. Pay attention to portion sizes, drink less juice and soda pop, and eat more fruits and vegetables. ¨ Eat a healthy amount of food. A 3-ounce serving of meat, for example, is about the size of a deck of cards. Fill the rest of your plate with vegetables and whole grains. ¨ Limit the amount of soda and sports drinks you have every day. Drink more water when you are thirsty. ¨ Eat at least 5 servings of fruits and vegetables every day. It may seem like a lot, but it is not hard to reach this goal. A serving or helping is 1 piece of fruit, 1 cup of vegetables, or 2 cups of leafy, raw vegetables. Have an apple or some carrot sticks as an afternoon snack instead of a candy bar. Try to have fruits and/or vegetables at every meal.  · Make exercise part of your daily routine. You may want to start with simple activities, such as walking, bicycling, or slow swimming. Try to be active 30 to 60 minutes every day. You do not need to do all 30 to 60 minutes all at once. For example, you can exercise 3 times a day for 10 or 20 minutes.  Moderate exercise is safe for most people, but it is always a good idea to talk to your doctor before starting an exercise program.  · Keep moving. Carina Mottas the lawn, work in the garden, or [employer]. Take the stairs instead of the elevator at work. · If you smoke, quit. People who smoke have an increased risk for heart attack, stroke, cancer, and other lung illnesses. Quitting is hard, but there are ways to boost your chance of quitting tobacco for good. ¨ Use nicotine gum, patches, or lozenges. ¨ Ask your doctor about stop-smoking programs and medicines. ¨ Keep trying. In addition to reducing your risk of diseases in the future, you will notice some benefits soon after you stop using tobacco. If you have shortness of breath or asthma symptoms, they will likely get better within a few weeks after you quit. · Limit how much alcohol you drink. Moderate amounts of alcohol (up to 2 drinks a day for men, 1 drink a day for women) are okay. But drinking too much can lead to liver problems, high blood pressure, and other health problems. Family health  If you have a family, there are many things you can do together to improve your health. · Eat meals together as a family as often as possible. · Eat healthy foods. This includes fruits, vegetables, lean meats and dairy, and whole grains. · Include your family in your fitness plan. Most people think of activities such as jogging or tennis as the way to fitness, but there are many ways you and your family can be more active. Anything that makes you breathe hard and gets your heart pumping is exercise. Here are some tips:  ¨ Walk to do errands or to take your child to school or the bus. ¨ Go for a family bike ride after dinner instead of watching TV. Where can you learn more? Go to http://andrew-rocky.info/. Enter V617 in the search box to learn more about \"A Healthy Lifestyle: Care Instructions. \"  Current as of: July 26, 2016  Content Version: 11.2  © 6012-6838 [employer]. Care instructions adapted under license by [employer] (which disclaims liability or warranty for this information). If you have questions about a medical condition or this instruction, always ask your healthcare professional. [address] any warranty or liability for your use of this information.
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
## 2017-04-11 ENCOUNTER — OFFICE VISIT (OUTPATIENT)
Dept: NEUROSURGERY | Age: 57
End: 2017-04-11

## 2017-04-11 VITALS
WEIGHT: 122 LBS | TEMPERATURE: 98.1 F | BODY MASS INDEX: 24.6 KG/M2 | SYSTOLIC BLOOD PRESSURE: 165 MMHG | RESPIRATION RATE: 18 BRPM | DIASTOLIC BLOOD PRESSURE: 86 MMHG | OXYGEN SATURATION: 98 % | HEIGHT: 59 IN | HEART RATE: 86 BPM

## 2017-04-11 DIAGNOSIS — I67.1 ANEURYSM OF CAVERNOUS PORTION OF RIGHT INTERNAL CAROTID ARTERY: Primary | ICD-10-CM

## 2017-04-11 RX ORDER — CLOPIDOGREL BISULFATE 75 MG/1
75 TABLET ORAL DAILY
Qty: 30 TAB | Refills: 0 | Status: SHIPPED | OUTPATIENT
Start: 2017-04-11 | End: 2017-04-11 | Stop reason: CLARIF
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
## 2017-04-11 NOTE — LETTER
4/11/2017 Patient:  Phu Hernandez YOB: 1960 Date of Visit: 4/11/2017 Dear Marcin Rubin MD 
Patient Can Only Remember The Practice Name And Not The Physician 
VIA Mail I have has the pleasure of seeing Phu Hernandez today 4/11/2017, who as you know is a 48 yo female who presents today as a new patient for incidental finding of a Right carotid cave ICA aneurysm. Pt originally presented to ED after 4 -5 day onset of worse HA, photophobia and fluctuating dizziness. Initially she was discharged to home, but then returned after LP was abnormal. She was readmitted and treated for bacterial meningitis. The 2nd LP was clean. 
  
Today patient states HA are lingering, but significantly improved with with time. She denies photosensitivity, continues to have occasional dizziness especially prominent with movement. I have had a long conversation with the patient and her  discussing the natural history of the disease, and the risks, benefits and alternatives to treatment. The patient voices understanding and would like consider he options before making a decision. Thank you for referring this pleasant patient to us. If you have any questions please feel free to email me at [email or web address], or call me in my office at [phone] or my cell [phone]. Avni Zhang MD 
Professor of Radiology, Neurology and Neurological Surgery [facility] [address]  [address] [facility] [address] [place]

## 2017-04-11 NOTE — MR AVS SNAPSHOT
Visit Information Date & Time Provider Department Dept. Phone Encounter #  
 4/11/2017 11:00 AM Tahmina Gambino MD [facility] Neurointerventional Surgery [phone] [id number] Follow-up Instructions Return in about 6 months (around 10/11/2017), or clinc visit and MRA. Upcoming Health Maintenance Date Due Hepatitis C Screening 1960 DTaP/Tdap/Td series (1 - Tdap) 12/11/1983 PAP AKA CERVICAL CYTOLOGY 12/11/1983 FOBT Q 1 YEAR AGE 50-75 12/11/2012 BREAST CANCER SCRN MAMMOGRAM 3/16/2019 Allergies as of 4/11/2017  Review Complete On: 4/11/2017 By: Nettie Gao Severity Noted Reaction Type Reactions Percocet [Oxycodone-acetaminophen]  03/04/2013    Itching Current Immunizations  Never Reviewed Name Date Influenza Vaccine (Quad) PF 3/13/2017  9:39 AM  
  
 Not reviewed this visit You Were Diagnosed With   
  
 Codes Comments Aneurysm of cavernous portion of right internal carotid artery    -  Primary ICD-10-CM: I67.1 ICD-9-CM: 437.3 Vitals BP Pulse Temp Resp Height(growth percentile) Weight(growth percentile) 165/86 (BP 1 Location: Left arm, BP Patient Position: Sitting) 86 98.1 °F (36.7 °C) (Oral) 18 4' 11\" (1.499 m) 122 lb (55.3 kg) LMP SpO2 BMI OB Status Smoking Status 04/22/2013 98% 24.64 kg/m2 Postmenopausal Never Smoker Vitals History BMI and BSA Data Body Mass Index Body Surface Area  
 24.64 kg/m 2 1.52 m 2 Preferred Pharmacy Pharmacy Name Phone [id number] [address] [phone] Your Updated Medication List  
  
   
This list is accurate as of: 4/11/17  1:22 PM.  Always use your most recent med list.  
  
  
  
  
 ALPRAZolam 1 mg tablet Commonly known as:  XANAX  
TAKE 1/2-1 TABLET ORALLY DAILY AS NEEDED  
  
 amitriptyline 25 mg tablet Commonly known as:  ELAVIL Take 1 Tab by mouth nightly. dextroamphetamine-amphetamine 30 mg tablet Commonly known as:  ADDERALL Take 30 mg by mouth daily. Dextroamphetamine-amphetamine 30 mg tab administration instructions: Take one tab orally every morning and 1/2 tab (15 mg) every afternoon) FLUoxetine 10 mg tablet Commonly known as:  PROzac Take 20 mg by mouth daily. levothyroxine 25 mcg tablet Commonly known as:  SYNTHROID Take 25 mcg by mouth Daily (before breakfast). temazepam 30 mg capsule Commonly known as:  RESTORIL  
TAKE ONE CAPSULE BY MOUTH AT BEDTIME AS NEEDED FOR SLEEP Follow-up Instructions Return in about 6 months (around 10/11/2017), or clinc visit and MRA. Introducing [facility]! Dear Bari Hancock: 
Thank you for requesting a Thismoment account. Our records indicate that you already have an active Thismoment account. You can access your account anytime at [email or web address] Did you know that you can access your hospital and ER discharge instructions at any time in Thismoment? You can also review all of your test results from your hospital stay or ER visit. Additional Information If you have questions, please visit the Frequently Asked Questions section of the Thismoment website at [email or web address] Remember, Thismoment is NOT to be used for urgent needs. For medical emergencies, dial 911. Now available from your iPhone and Android! Please provide this summary of care documentation to your next provider. Your primary care clinician is listed as Phys Other. If you have any questions after today's visit, please call [phone].

## 2017-05-17 ENCOUNTER — TELEPHONE (OUTPATIENT)
Dept: NEUROLOGY | Age: 57
End: 2017-05-17

## 2017-05-17 NOTE — TELEPHONE ENCOUNTER
----- Message from Deepa Cedeño sent at 5/17/2017 12:23 PM EDT -----  Regarding: Roddy ESPARZA/Telephone  Pt wanted to speak with the nurse about how should she handle her f/up after her appt.  Pt best contact number [phone]

## 2017-06-09 ENCOUNTER — OFFICE VISIT (OUTPATIENT)
Dept: NEUROLOGY | Age: 57
End: 2017-06-09

## 2017-06-09 VITALS
SYSTOLIC BLOOD PRESSURE: 110 MMHG | DIASTOLIC BLOOD PRESSURE: 66 MMHG | OXYGEN SATURATION: 98 % | BODY MASS INDEX: 24.8 KG/M2 | HEART RATE: 111 BPM | HEIGHT: 59 IN | WEIGHT: 123 LBS

## 2017-06-09 DIAGNOSIS — F09 COGNITIVE DISORDER: ICD-10-CM

## 2017-06-09 DIAGNOSIS — F32.A DEPRESSION, UNSPECIFIED DEPRESSION TYPE: ICD-10-CM

## 2017-06-09 DIAGNOSIS — F41.9 ANXIETY: ICD-10-CM

## 2017-06-09 DIAGNOSIS — I72.0 CAROTID ANEURYSM, RIGHT (HCC): ICD-10-CM

## 2017-06-09 DIAGNOSIS — G44.219 EPISODIC TENSION-TYPE HEADACHE, NOT INTRACTABLE: Primary | ICD-10-CM
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
## 2017-06-09 NOTE — MR AVS SNAPSHOT
Visit Information Date & Time Provider Department Dept. Phone Encounter #  
 6/9/2017  3:00 PM Cheryl Jj NP [facility] [phone] [id number] Follow-up Instructions Return if symptoms worsen or fail to improve. Upcoming Health Maintenance Date Due Hepatitis C Screening 1960 DTaP/Tdap/Td series (1 - Tdap) 12/11/1983 PAP AKA CERVICAL CYTOLOGY 12/11/1983 FOBT Q 1 YEAR AGE 50-75 12/11/2012 INFLUENZA AGE 9 TO ADULT 8/1/2017 BREAST CANCER SCRN MAMMOGRAM 3/16/2019 Allergies as of 6/9/2017  Review Complete On: 6/9/2017 By: Teodora Moya LPN Severity Noted Reaction Type Reactions Percocet [Oxycodone-acetaminophen]  03/04/2013    Itching Current Immunizations  Never Reviewed Name Date Influenza Vaccine (Quad) PF 3/13/2017  9:39 AM  
  
 Not reviewed this visit You Were Diagnosed With   
  
 Codes Comments Episodic tension-type headache, not intractable    -  Primary ICD-10-CM: H76.022 ICD-9-CM: 339.11 Cognitive disorder     ICD-10-CM: F09 ICD-9-CM: 294.9 Depression, unspecified depression type     ICD-10-CM: F32.9 ICD-9-CM: 490 Anxiety     ICD-10-CM: F41.9 ICD-9-CM: 300.00 Vitals BP Pulse Height(growth percentile) Weight(growth percentile) LMP SpO2  
 110/66 (!) 111 4' 11\" (1.499 m) 123 lb (55.8 kg) 04/22/2013 98% BMI OB Status Smoking Status 24.84 kg/m2 Postmenopausal Never Smoker Vitals History BMI and BSA Data Body Mass Index Body Surface Area  
 24.84 kg/m 2 1.52 m 2 Preferred Pharmacy Pharmacy Name Phone [id number], [address] [phone] Your Updated Medication List  
  
   
This list is accurate as of: 6/9/17  3:30 PM.  Always use your most recent med list.  
  
  
  
  
 ALPRAZolam 1 mg tablet Commonly known as:  Beba Coburn TAKE 1/2-1 TABLET ORALLY DAILY AS NEEDED  
  
 amitriptyline 25 mg tablet Commonly known as:  ELAVIL Take 1 Tab by mouth nightly. dextroamphetamine-amphetamine 30 mg tablet Commonly known as:  ADDERALL Take 30 mg by mouth daily. Dextroamphetamine-amphetamine 30 mg tab administration instructions: Take one tab orally every morning and 1/2 tab (15 mg) every afternoon) FLUoxetine 10 mg tablet Commonly known as:  PROzac Take 20 mg by mouth daily. levothyroxine 25 mcg tablet Commonly known as:  SYNTHROID Take 25 mcg by mouth Daily (before breakfast). temazepam 30 mg capsule Commonly known as:  RESTORIL  
TAKE ONE CAPSULE BY MOUTH AT BEDTIME AS NEEDED FOR SLEEP Follow-up Instructions Return if symptoms worsen or fail to improve. Patient Instructions I recommend that you  follow-up with a psychologist for therapy in addition to your psychiatrist 
 
If you feel that your mood issues and stress are under better control, you can call Dr. Tristian Shelby office to repeat memory testing in 12-18 months Call our officeand make an appointment if your headaches are getting worse PRESCRIPTION REFILL POLICY [facility] Statement to Patients April 1, 2014 In an effort to ensure the large volume of patient prescription refills is processed in the most efficient and expeditious manner, we are asking our patients to assist us by calling your Pharmacy for all prescription refills, this will include also your  Mail Order Pharmacy. The pharmacy will contact our office electronically to continue the refill process. Please do not wait until the last minute to call your pharmacy. We need at least 48 hours (2days) to fill prescriptions. We also encourage you to call your pharmacy before going to  your prescription to make sure it is ready.   
 
With regard to controlled substance prescription refill requests (narcotic refills) that need to be picked up at our office, we ask your cooperation by providing us with at least 72 hours (3days) notice that you will need a refill. We will not refill narcotic prescription refill requests after 4:00pm on any weekday, Monday through Thursday, or after 2:00pm on Fridays, or on the weekends. We encourage everyone to explore another way of getting your prescription refill request processed using [employer], our patient web portal through our electronic medical record system. [employer] is an efficient and effective way to communicate your medication request directly to the office and  downloadable as an esperanza on your smart phone . [employer] also features a review functionality that allows you to view your medication list as well as leave messages for your physician. Are you ready to get connected? If so please review the attatched instructions or speak to any of our staff to get you set up right away! Thank you so much for your cooperation. Should you have any questions please contact our Practice Administrator. The Physicians and Staff,  [facility] A Healthy Lifestyle: Care Instructions Your Care Instructions A healthy lifestyle can help you feel good, stay at a healthy weight, and have plenty of energy for both work and play. A healthy lifestyle is something you can share with your whole family. A healthy lifestyle also can lower your risk for serious health problems, such as high blood pressure, heart disease, and diabetes. You can follow a few steps listed below to improve your health and the health of your family. Follow-up care is a key part of your treatment and safety. Be sure to make and go to all appointments, and call your doctor if you are having problems. Its also a good idea to know your test results and keep a list of the medicines you take. How can you care for yourself at home? · Do not eat too much sugar, fat, or fast foods. You can still have dessert and treats now and then. The goal is moderation. · Start small to improve your eating habits. Pay attention to portion sizes, drink less juice and soda pop, and eat more fruits and vegetables. ¨ Eat a healthy amount of food. A 3-ounce serving of meat, for example, is about the size of a deck of cards. Fill the rest of your plate with vegetables and whole grains. ¨ Limit the amount of soda and sports drinks you have every day. Drink more water when you are thirsty. ¨ Eat at least 5 servings of fruits and vegetables every day. It may seem like a lot, but it is not hard to reach this goal. A serving or helping is 1 piece of fruit, 1 cup of vegetables, or 2 cups of leafy, raw vegetables. Have an apple or some carrot sticks as an afternoon snack instead of a candy bar. Try to have fruits and/or vegetables at every meal. 
· Make exercise part of your daily routine. You may want to start with simple activities, such as walking, bicycling, or slow swimming. Try to be active 30 to 60 minutes every day. You do not need to do all 30 to 60 minutes all at once. For example, you can exercise 3 times a day for 10 or 20 minutes. Moderate exercise is safe for most people, but it is always a good idea to talk to your doctor before starting an exercise program. 
· Keep moving. Koreen Meli the lawn, work in the garden, or [employer]. Take the stairs instead of the elevator at work. · If you smoke, quit. People who smoke have an increased risk for heart attack, stroke, cancer, and other lung illnesses. Quitting is hard, but there are ways to boost your chance of quitting tobacco for good. ¨ Use nicotine gum, patches, or lozenges. ¨ Ask your doctor about stop-smoking programs and medicines. ¨ Keep trying.  
In addition to reducing your risk of diseases in the future, you will notice some benefits soon after you stop using tobacco. If you have shortness of breath or asthma symptoms, they will likely get better within a few weeks after you quit. · Limit how much alcohol you drink. Moderate amounts of alcohol (up to 2 drinks a day for men, 1 drink a day for women) are okay. But drinking too much can lead to liver problems, high blood pressure, and other health problems. Family health If you have a family, there are many things you can do together to improve your health. · Eat meals together as a family as often as possible. · Eat healthy foods. This includes fruits, vegetables, lean meats and dairy, and whole grains. · Include your family in your fitness plan. Most people think of activities such as jogging or tennis as the way to fitness, but there are many ways you and your family can be more active. Anything that makes you breathe hard and gets your heart pumping is exercise. Here are some tips: 
¨ Walk to do errands or to take your child to school or the bus. ¨ Go for a family bike ride after dinner instead of watching TV. Where can you learn more? Go to http://andrew-rocky.info/. Enter O630 in the search box to learn more about \"A Healthy Lifestyle: Care Instructions. \" Current as of: July 26, 2016 Content Version: 11.2 © 4371-6631 [employer]. Care instructions adapted under license by [employer] (which disclaims liability or warranty for this information). If you have questions about a medical condition or this instruction, always ask your healthcare professional. [place] [address] any warranty or liability for your use of this information. Introducing [facility]! Dear Regla Faulkner: 
Thank you for requesting a [employer] account. Our records indicate that you already have an active [employer] account. You can access your account anytime at [email or web address] Did you know that you can access your hospital and ER discharge instructions at any time in CoverMe? You can also review all of your test results from your hospital stay or ER visit. Additional Information If you have questions, please visit the Frequently Asked Questions section of the CoverMe website at [email or web address] Remember, CoverMe is NOT to be used for urgent needs. For medical emergencies, dial 911. Now available from your iPhone and Android! Please provide this summary of care documentation to your next provider. Your primary care clinician is listed as Phys Other. If you have any questions after today's visit, please call [phone].

## 2017-06-09 NOTE — PATIENT INSTRUCTIONS
I recommend that you  follow-up with a psychologist for therapy in addition to your psychiatrist    If you feel that your mood issues and stress are under better control, you can call Dr. Wang Roe office to repeat memory testing in 12-18 months    Call our officeand make an appointment if your headaches are getting worse           [address] [facility]   Statement to Patients  April 1, 2014      In an effort to ensure the large volume of patient prescription refills is processed in the most efficient and expeditious manner, we are asking our patients to assist us by calling your Pharmacy for all prescription refills, this will include also your  Mail Order Pharmacy. The pharmacy will contact our office electronically to continue the refill process. Please do not wait until the last minute to call your pharmacy. We need at least 48 hours (2days) to fill prescriptions. We also encourage you to call your pharmacy before going to  your prescription to make sure it is ready. With regard to controlled substance prescription refill requests (narcotic refills) that need to be picked up at our office, we ask your cooperation by providing us with at least 72 hours (3days) notice that you will need a refill. We will not refill narcotic prescription refill requests after 4:00pm on any weekday, Monday through Thursday, or after 2:00pm on Fridays, or on the weekends. We encourage everyone to explore another way of getting your prescription refill request processed using [employer], our patient web portal through our electronic medical record system. [employer] is an efficient and effective way to communicate your medication request directly to the office and  downloadable as an esperanza on your smart phone . [employer] also features a review functionality that allows you to view your medication list as well as leave messages for your physician. Are you ready to get connected?  If so please review the attatched instructions or speak to any of our staff to get you set up right away! Thank you so much for your cooperation. Should you have any questions please contact our Practice Administrator. The Physicians and Staff,  [facility]          A Healthy Lifestyle: Care Instructions  Your Care Instructions  A healthy lifestyle can help you feel good, stay at a healthy weight, and have plenty of energy for both work and play. A healthy lifestyle is something you can share with your whole family. A healthy lifestyle also can lower your risk for serious health problems, such as high blood pressure, heart disease, and diabetes. You can follow a few steps listed below to improve your health and the health of your family. Follow-up care is a key part of your treatment and safety. Be sure to make and go to all appointments, and call your doctor if you are having problems. Its also a good idea to know your test results and keep a list of the medicines you take. How can you care for yourself at home? · Do not eat too much sugar, fat, or fast foods. You can still have dessert and treats now and then. The goal is moderation. · Start small to improve your eating habits. Pay attention to portion sizes, drink less juice and soda pop, and eat more fruits and vegetables. ¨ Eat a healthy amount of food. A 3-ounce serving of meat, for example, is about the size of a deck of cards. Fill the rest of your plate with vegetables and whole grains. ¨ Limit the amount of soda and sports drinks you have every day. Drink more water when you are thirsty. ¨ Eat at least 5 servings of fruits and vegetables every day. It may seem like a lot, but it is not hard to reach this goal. A serving or helping is 1 piece of fruit, 1 cup of vegetables, or 2 cups of leafy, raw vegetables. Have an apple or some carrot sticks as an afternoon snack instead of a candy bar.  Try to have fruits and/or vegetables at every meal.  · Make exercise part of your daily routine. You may want to start with simple activities, such as walking, bicycling, or slow swimming. Try to be active 30 to 60 minutes every day. You do not need to do all 30 to 60 minutes all at once. For example, you can exercise 3 times a day for 10 or 20 minutes. Moderate exercise is safe for most people, but it is always a good idea to talk to your doctor before starting an exercise program.  · Keep moving. [employer] the lawn, work in the garden, or [employer]. Take the stairs instead of the elevator at work. · If you smoke, quit. People who smoke have an increased risk for heart attack, stroke, cancer, and other lung illnesses. Quitting is hard, but there are ways to boost your chance of quitting tobacco for good. ¨ Use nicotine gum, patches, or lozenges. ¨ Ask your doctor about stop-smoking programs and medicines. ¨ Keep trying. In addition to reducing your risk of diseases in the future, you will notice some benefits soon after you stop using tobacco. If you have shortness of breath or asthma symptoms, they will likely get better within a few weeks after you quit. · Limit how much alcohol you drink. Moderate amounts of alcohol (up to 2 drinks a day for men, 1 drink a day for women) are okay. But drinking too much can lead to liver problems, high blood pressure, and other health problems. Family health  If you have a family, there are many things you can do together to improve your health. · Eat meals together as a family as often as possible. · Eat healthy foods. This includes fruits, vegetables, lean meats and dairy, and whole grains. · Include your family in your fitness plan. Most people think of activities such as jogging or tennis as the way to fitness, but there are many ways you and your family can be more active. Anything that makes you breathe hard and gets your heart pumping is exercise.  Here are some tips:  ¨ Walk to do errands or to take your child to school or the bus. ¨ Go for a family bike ride after dinner instead of watching TV. Where can you learn more? Go to http://andrew-rocky.info/. Enter V607 in the search box to learn more about \"A Healthy Lifestyle: Care Instructions. \"  Current as of: July 26, 2016  Content Version: 11.2  © 9251-7078 [employer]. Care instructions adapted under license by [employer] (which disclaims liability or warranty for this information). If you have questions about a medical condition or this instruction, always ask your healthcare professional. [place] [address] any warranty or liability for your use of this information.

## 2017-06-22 ENCOUNTER — TELEPHONE (OUTPATIENT)
Dept: NEUROLOGY | Age: 57
End: 2017-06-22

## 2017-06-22 ENCOUNTER — OFFICE VISIT (OUTPATIENT)
Dept: NEUROLOGY | Age: 57
End: 2017-06-22

## 2017-06-22 VITALS
SYSTOLIC BLOOD PRESSURE: 90 MMHG | HEART RATE: 75 BPM | OXYGEN SATURATION: 97 % | RESPIRATION RATE: 18 BRPM | DIASTOLIC BLOOD PRESSURE: 50 MMHG

## 2017-06-22 DIAGNOSIS — S06.0X0A CONCUSSION, WITHOUT LOC, INITIAL ENCOUNTER: Primary | ICD-10-CM

## 2017-06-22 NOTE — PATIENT INSTRUCTIONS
10 [facility]   Statement to Patients  April 1, 2014      In an effort to ensure the large volume of patient prescription refills is processed in the most efficient and expeditious manner, we are asking our patients to assist us by calling your Pharmacy for all prescription refills, this will include also your  Mail Order Pharmacy. The pharmacy will contact our office electronically to continue the refill process. Please do not wait until the last minute to call your pharmacy. We need at least 48 hours (2days) to fill prescriptions. We also encourage you to call your pharmacy before going to  your prescription to make sure it is ready. With regard to controlled substance prescription refill requests (narcotic refills) that need to be picked up at our office, we ask your cooperation by providing us with at least 72 hours (3days) notice that you will need a refill. We will not refill narcotic prescription refill requests after 4:00pm on any weekday, Monday through Thursday, or after 2:00pm on Fridays, or on the weekends. We encourage everyone to explore another way of getting your prescription refill request processed using [employer], our patient web portal through our electronic medical record system. [employer] is an efficient and effective way to communicate your medication request directly to the office and  downloadable as an esperanza on your smart phone . [employer] also features a review functionality that allows you to view your medication list as well as leave messages for your physician. Are you ready to get connected? If so please review the attatched instructions or speak to any of our staff to get you set up right away! Thank you so much for your cooperation. Should you have any questions please contact our Practice Administrator.     The Physicians and Staff,  [facility]     Thank you for choosing [facility] and [facility] for your     care. You may receive a survey about your visit. We appreciate you taking time     to complete this survey as we use your feedback to improve our services. We     realize we are not perfect, but we strive to provide excellent care.

## 2017-06-22 NOTE — MR AVS SNAPSHOT
Visit Information Date & Time Provider Department Dept. Phone Encounter #  
 6/22/2017 10:30 AM Matias Hunter Siddharth Gonzales DO [facility] at [address] [id number] Follow-up Instructions Return see [facility]. Upcoming Health Maintenance Date Due Hepatitis C Screening 1960 DTaP/Tdap/Td series (1 - Tdap) 12/11/1983 PAP AKA CERVICAL CYTOLOGY 12/11/1983 FOBT Q 1 YEAR AGE 50-75 12/11/2012 INFLUENZA AGE 9 TO ADULT 8/1/2017 BREAST CANCER SCRN MAMMOGRAM 3/16/2019 Allergies as of 6/22/2017  Review Complete On: 6/22/2017 By: Lashay Miranda LPN Severity Noted Reaction Type Reactions Percocet [Oxycodone-acetaminophen]  03/04/2013    Itching Current Immunizations  Never Reviewed Name Date Influenza Vaccine (Quad) PF 3/13/2017  9:39 AM  
  
 Not reviewed this visit You Were Diagnosed With   
  
 Codes Comments Concussion, without LOC, initial encounter    -  Primary ICD-10-CM: S06.0X0A 
ICD-9-CM: 850.0 Vitals BP Pulse Resp LMP SpO2 OB Status 90/50 75 18 04/22/2013 97% Postmenopausal  
 Smoking Status Never Smoker Preferred Pharmacy Pharmacy Name Phone [id number], [address] [phone] Your Updated Medication List  
  
   
This list is accurate as of: 6/22/17 10:52 AM.  Always use your most recent med list.  
  
  
  
  
 ALPRAZolam 1 mg tablet Commonly known as:  XANAX  
TAKE 1/2-1 TABLET ORALLY DAILY AS NEEDED  
  
 amitriptyline 25 mg tablet Commonly known as:  ELAVIL Take 1 Tab by mouth nightly. dextroamphetamine-amphetamine 30 mg tablet Commonly known as:  ADDERALL Take 30 mg by mouth daily. Dextroamphetamine-amphetamine 30 mg tab administration instructions: Take one tab orally every morning and 1/2 tab (15 mg) every afternoon) FLUoxetine 10 mg tablet Commonly known as:  PROzac Take 20 mg by mouth daily. levothyroxine 25 mcg tablet Commonly known as:  SYNTHROID Take 25 mcg by mouth Daily (before breakfast). temazepam 30 mg capsule Commonly known as:  RESTORIL  
TAKE ONE CAPSULE BY MOUTH AT BEDTIME AS NEEDED FOR SLEEP Follow-up Instructions Return see [facility]. Patient Instructions PRESCRIPTION REFILL POLICY [facility] Statement to Patients April 1, 2014 In an effort to ensure the large volume of patient prescription refills is processed in the most efficient and expeditious manner, we are asking our patients to assist us by calling your Pharmacy for all prescription refills, this will include also your  Mail Order Pharmacy. The pharmacy will contact our office electronically to continue the refill process. Please do not wait until the last minute to call your pharmacy. We need at least 48 hours (2days) to fill prescriptions. We also encourage you to call your pharmacy before going to  your prescription to make sure it is ready. With regard to controlled substance prescription refill requests (narcotic refills) that need to be picked up at our office, we ask your cooperation by providing us with at least 72 hours (3days) notice that you will need a refill. We will not refill narcotic prescription refill requests after 4:00pm on any weekday, Monday through Thursday, or after 2:00pm on Fridays, or on the weekends. We encourage everyone to explore another way of getting your prescription refill request processed using Microland, our patient web portal through our electronic medical record system. Microland is an efficient and effective way to communicate your medication request directly to the office and  downloadable as an esperanza on your smart phone .  Microland also features a review functionality that allows you to view your medication list as well as leave messages for your physician. Are you ready to get connected? If so please review the attatched instructions or speak to any of our staff to get you set up right away! Thank you so much for your cooperation. Should you have any questions please contact our Practice Administrator. The Physicians and Staff,  [facility] Thank you for choosing [facility] for your  
 
care. You may receive a survey about your visit. We appreciate you taking time  
 
to complete this survey as we use your feedback to improve our services. We  
 
realize we are not perfect, but we strive to provide excellent care. Introducing [facility]! Dear Randolph Warner 7730: 
Thank you for requesting a DineInTime account. Our records indicate that you already have an active DineInTime account. You can access your account anytime at [email or web address] Did you know that you can access your hospital and ER discharge instructions at any time in DineInTime? You can also review all of your test results from your hospital stay or ER visit. Additional Information If you have questions, please visit the Frequently Asked Questions section of the DineInTime website at [email or web address] Remember, DineInTime is NOT to be used for urgent needs. For medical emergencies, dial 911. Now available from your iPhone and Android! Please provide this summary of care documentation to your next provider. Your primary care clinician is listed as Phys Other. If you have any questions after today's visit, please call [phone].
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
## 2017-07-31 ENCOUNTER — OFFICE VISIT (OUTPATIENT)
Dept: NEUROLOGY | Age: 57
End: 2017-07-31

## 2017-07-31 VITALS
RESPIRATION RATE: 18 BRPM | SYSTOLIC BLOOD PRESSURE: 140 MMHG | DIASTOLIC BLOOD PRESSURE: 80 MMHG | HEART RATE: 72 BPM | OXYGEN SATURATION: 98 %

## 2017-07-31 DIAGNOSIS — S06.0X0D CONCUSSION, WITHOUT LOC, SUBSEQUENT ENCOUNTER: Primary | ICD-10-CM

## 2017-07-31 DIAGNOSIS — G44.221 CHRONIC TENSION-TYPE HEADACHE, INTRACTABLE: ICD-10-CM

## 2017-07-31 DIAGNOSIS — I72.0 CAROTID ANEURYSM, RIGHT (HCC): ICD-10-CM

## 2017-07-31 DIAGNOSIS — F09 COGNITIVE DISORDER: ICD-10-CM

## 2017-07-31 RX ORDER — TOPIRAMATE 25 MG/1
25 TABLET ORAL 2 TIMES DAILY
Qty: 60 TAB | Refills: 3 | Status: SHIPPED | OUTPATIENT
Start: 2017-07-31 | End: 2017-10-16 | Stop reason: SDUPTHER

## 2017-07-31 RX ORDER — BACLOFEN 20 MG
250 TABLET ORAL
COMMUNITY
End: 2018-08-02

## 2017-07-31 RX ORDER — CYCLOBENZAPRINE HCL 5 MG
5 TABLET ORAL
COMMUNITY
End: 2019-09-30
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
## 2017-07-31 NOTE — PATIENT INSTRUCTIONS
10 [facility]   Statement to Patients  April 1, 2014      In an effort to ensure the large volume of patient prescription refills is processed in the most efficient and expeditious manner, we are asking our patients to assist us by calling your Pharmacy for all prescription refills, this will include also your  Mail Order Pharmacy. The pharmacy will contact our office electronically to continue the refill process. Please do not wait until the last minute to call your pharmacy. We need at least 48 hours (2days) to fill prescriptions. We also encourage you to call your pharmacy before going to  your prescription to make sure it is ready. With regard to controlled substance prescription refill requests (narcotic refills) that need to be picked up at our office, we ask your cooperation by providing us with at least 72 hours (3days) notice that you will need a refill. We will not refill narcotic prescription refill requests after 4:00pm on any weekday, Monday through Thursday, or after 2:00pm on Fridays, or on the weekends. We encourage everyone to explore another way of getting your prescription refill request processed using [employer], our patient web portal through our electronic medical record system. [employer] is an efficient and effective way to communicate your medication request directly to the office and  downloadable as an esperanza on your smart phone . [employer] also features a review functionality that allows you to view your medication list as well as leave messages for your physician. Are you ready to get connected? If so please review the attatched instructions or speak to any of our staff to get you set up right away! Thank you so much for your cooperation. Should you have any questions please contact our Practice Administrator.     The Physicians and Staff,  [facility]     Thank you for choosing [facility] for your     care. You may receive a survey about your visit. We appreciate you taking time     to complete this survey as we use your feedback to improve our services. We     realize we are not perfect, but we strive to provide excellent care.

## 2017-07-31 NOTE — MR AVS SNAPSHOT
Visit Information Date & Time Provider Department Dept. Phone Encounter #  
 7/31/2017  1:20  [facility], [address] [facility] at [address] [id number] Follow-up Instructions Return in about 3 months (around 10/31/2017). Upcoming Health Maintenance Date Due Hepatitis C Screening 1960 DTaP/Tdap/Td series (1 - Tdap) 12/11/1983 PAP AKA CERVICAL CYTOLOGY 12/11/1983 FOBT Q 1 YEAR AGE 50-75 12/11/2012 INFLUENZA AGE 9 TO ADULT 8/1/2017 BREAST CANCER SCRN MAMMOGRAM 3/16/2019 Allergies as of 7/31/2017  Review Complete On: 7/31/2017 By: Milly Pimentel LPN Severity Noted Reaction Type Reactions Percocet [Oxycodone-acetaminophen]  03/04/2013    Itching Current Immunizations  Never Reviewed Name Date Influenza Vaccine (Quad) PF 3/13/2017  9:39 AM  
  
 Not reviewed this visit You Were Diagnosed With   
  
 Codes Comments Concussion, without LOC, subsequent encounter    -  Primary ICD-10-CM: S06.0X0D ICD-9-CM: V58.89 Chronic tension-type headache, intractable     ICD-10-CM: B37.962 ICD-9-CM: 339.12 Cognitive disorder     ICD-10-CM: F09 ICD-9-CM: 294.9 Carotid aneurysm, right (HCC)     ICD-10-CM: I72.0 ICD-9-CM: 442.81 Vitals BP Pulse Resp LMP SpO2 OB Status 140/80 72 18 04/22/2013 98% Postmenopausal  
 Smoking Status Never Smoker Vitals History Preferred Pharmacy Pharmacy Name Phone [id number] [place], [address] [place] [address] [phone] Your Updated Medication List  
  
   
This list is accurate as of: 7/31/17  1:40 PM.  Always use your most recent med list.  
  
  
  
  
 ALPRAZolam 1 mg tablet Commonly known as:  XANAX  
TAKE 1/2-1 TABLET ORALLY DAILY AS NEEDED  
  
 amitriptyline 25 mg tablet Commonly known as:  ELAVIL Take 1 Tab by mouth nightly. cyclobenzaprine 5 mg tablet Commonly known as:  FLEXERIL Take 5 mg by mouth. dextroamphetamine-amphetamine 30 mg tablet Commonly known as:  ADDERALL Take 30 mg by mouth daily. Dextroamphetamine-amphetamine 30 mg tab administration instructions: Take one tab orally every morning and 1/2 tab (15 mg) every afternoon) FLUoxetine 10 mg tablet Commonly known as:  PROzac Take 20 mg by mouth daily. levothyroxine 25 mcg tablet Commonly known as:  SYNTHROID Take 25 mcg by mouth Daily (before breakfast). magnesium oxide 500 mg Tab Take 250 mg by mouth. temazepam 30 mg capsule Commonly known as:  RESTORIL  
TAKE ONE CAPSULE BY MOUTH AT BEDTIME AS NEEDED FOR SLEEP  
  
 topiramate 25 mg tablet Commonly known as:  TOPAMAX Take 1 Tab by mouth two (2) times a day. Prescriptions Sent to Pharmacy Refills  
 topiramate (TOPAMAX) 25 mg tablet 3 Sig: Take 1 Tab by mouth two (2) times a day. Class: Normal  
 Pharmacy: [facility][id number]- [address]  #: [phone] Route: Oral  
  
Follow-up Instructions Return in about 3 months (around 10/31/2017). Patient Instructions PRESCRIPTION REFILL POLICY [facility] Statement to Patients April 1, 2014 In an effort to ensure the large volume of patient prescription refills is processed in the most efficient and expeditious manner, we are asking our patients to assist us by calling your Pharmacy for all prescription refills, this will include also your  Mail Order Pharmacy. The pharmacy will contact our office electronically to continue the refill process. Please do not wait until the last minute to call your pharmacy. We need at least 48 hours (2days) to fill prescriptions. We also encourage you to call your pharmacy before going to  your prescription to make sure it is ready. With regard to controlled substance prescription refill requests (narcotic refills) that need to be picked up at our office, we ask your cooperation by providing us with at least 72 hours (3days) notice that you will need a refill. We will not refill narcotic prescription refill requests after 4:00pm on any weekday, Monday through Thursday, or after 2:00pm on Fridays, or on the weekends. We encourage everyone to explore another way of getting your prescription refill request processed using [employer], our patient web portal through our electronic medical record system. [employer] is an efficient and effective way to communicate your medication request directly to the office and  downloadable as an esperanza on your smart phone . [employer] also features a review functionality that allows you to view your medication list as well as leave messages for your physician. Are you ready to get connected? If so please review the attatched instructions or speak to any of our staff to get you set up right away! Thank you so much for your cooperation. Should you have any questions please contact our Practice Administrator. The Physicians and Staff,  [facility] Thank you for choosing [facility] for your  
 
care. You may receive a survey about your visit. We appreciate you taking time  
 
to complete this survey as we use your feedback to improve our services. We  
 
realize we are not perfect, but we strive to provide excellent care. Introducing [facility]! Dear Patric Jeffries: 
Thank you for requesting a [employer] account. Our records indicate that you already have an active [employer] account. You can access your account anytime at [email or web address] Did you know that you can access your hospital and ER discharge instructions at any time in [employer]? You can also review all of your test results from your hospital stay or ER visit. Additional Information If you have questions, please visit the Frequently Asked Questions section of the [employer] website at [email or web address] Remember, [employer] is NOT to be used for urgent needs. For medical emergencies, dial 911. Now available from your iPhone and Android! Please provide this summary of care documentation to your next provider. Your primary care clinician is listed as Phys Other. If you have any questions after today's visit, please call [phone].

## 2017-09-06 DIAGNOSIS — R51.9 HEADACHE, CHRONIC DAILY: ICD-10-CM

## 2017-09-06 RX ORDER — AMITRIPTYLINE HYDROCHLORIDE 25 MG/1
TABLET, FILM COATED ORAL
Qty: 30 TAB | Refills: 4 | OUTPATIENT
Start: 2017-09-06

## 2017-09-07 RX ORDER — AMITRIPTYLINE HYDROCHLORIDE 25 MG/1
25 TABLET, FILM COATED ORAL
Qty: 30 TAB | Refills: 5 | Status: SHIPPED | OUTPATIENT
Start: 2017-09-07 | End: 2018-03-15 | Stop reason: SDUPTHER

## 2017-09-28 ENCOUNTER — TELEPHONE (OUTPATIENT)
Dept: NEUROSURGERY | Age: 57
End: 2017-09-28

## 2017-09-28 NOTE — TELEPHONE ENCOUNTER
TC to patient schedule ov for 10/17/neil bautista is seeing Dr Nely Lockwood at [facility] will be following up with him.

## 2017-10-16 ENCOUNTER — OFFICE VISIT (OUTPATIENT)
Dept: NEUROLOGY | Age: 57
End: 2017-10-16

## 2017-10-16 VITALS
RESPIRATION RATE: 18 BRPM | BODY MASS INDEX: 24.52 KG/M2 | WEIGHT: 121.6 LBS | HEART RATE: 86 BPM | OXYGEN SATURATION: 98 % | SYSTOLIC BLOOD PRESSURE: 118 MMHG | HEIGHT: 59 IN | DIASTOLIC BLOOD PRESSURE: 76 MMHG

## 2017-10-16 DIAGNOSIS — F41.9 ANXIETY: ICD-10-CM

## 2017-10-16 DIAGNOSIS — G44.221 CHRONIC TENSION-TYPE HEADACHE, INTRACTABLE: Primary | ICD-10-CM

## 2017-10-16 RX ORDER — TOPIRAMATE 50 MG/1
50 TABLET, FILM COATED ORAL 2 TIMES DAILY
Qty: 180 TAB | Refills: 1 | Status: SHIPPED | OUTPATIENT
Start: 2017-10-16 | End: 2018-04-16 | Stop reason: ALTCHOICE

## 2017-10-16 RX ORDER — BUSPIRONE HYDROCHLORIDE 5 MG/1
TABLET ORAL
Refills: 2 | COMMUNITY
Start: 2017-10-13 | End: 2018-10-22

## 2017-10-16 NOTE — MR AVS SNAPSHOT
Visit Information Date & Time Provider Department Dept. Phone Encounter #  
 10/16/2017 10:20 AM Victor Manuel March DO [facility] at [address] [id number] Follow-up Instructions Return in about 3 months (around 1/16/2018). Upcoming Health Maintenance Date Due Hepatitis C Screening 1960 DTaP/Tdap/Td series (1 - Tdap) 12/11/1983 PAP AKA CERVICAL CYTOLOGY 12/11/1983 FOBT Q 1 YEAR AGE 50-75 12/11/2012 INFLUENZA AGE 9 TO ADULT 8/1/2017 BREAST CANCER SCRN MAMMOGRAM 3/16/2019 Allergies as of 10/16/2017  Review Complete On: 7/31/2017 By: Dwayne Schmidt DO Severity Noted Reaction Type Reactions Percocet [Oxycodone-acetaminophen]  03/04/2013    Itching Current Immunizations  Never Reviewed Name Date Influenza Vaccine (Quad) PF 3/13/2017  9:39 AM  
  
 Not reviewed this visit You Were Diagnosed With   
  
 Codes Comments Chronic tension-type headache, intractable    -  Primary ICD-10-CM: T67.191 ICD-9-CM: 339.12 Anxiety     ICD-10-CM: F41.9 ICD-9-CM: 300.00 Vitals BP Pulse Resp Height(growth percentile) Weight(growth percentile) LMP  
 118/76 86 18 4' 11\" (1.499 m) 121 lb 9.6 oz (55.2 kg) 04/22/2013 SpO2 BMI OB Status Smoking Status 98% 24.56 kg/m2 Postmenopausal Never Smoker Vitals History BMI and BSA Data Body Mass Index Body Surface Area 24.56 kg/m 2 1.52 m 2 Preferred Pharmacy Pharmacy Name Phone [id number] [address] [phone] Your Updated Medication List  
  
   
This list is accurate as of: 10/16/17 10:52 AM.  Always use your most recent med list.  
  
  
  
  
 ALPRAZolam 1 mg tablet Commonly known as:  XANAX  
TAKE 1/2-1 TABLET ORALLY DAILY AS NEEDED  
  
 amitriptyline 25 mg tablet Commonly known as:  ELAVIL Take 1 Tab by mouth nightly. busPIRone 5 mg tablet Commonly known as:  BUSPAR  
TAKE 1/2 TO 1 TABLET BY MOUTH AS NEEDED FOR ANXIETY UP TO TWICE DAILY  
  
 cyclobenzaprine 5 mg tablet Commonly known as:  FLEXERIL Take 5 mg by mouth. dextroamphetamine-amphetamine 30 mg tablet Commonly known as:  ADDERALL Take 30 mg by mouth daily. Dextroamphetamine-amphetamine 30 mg tab administration instructions: Take one tab orally every morning and 1/2 tab (15 mg) every afternoon) FLUoxetine 10 mg tablet Commonly known as:  PROzac Take 20 mg by mouth daily. levothyroxine 25 mcg tablet Commonly known as:  SYNTHROID Take 75 mcg by mouth Daily (before breakfast). magnesium oxide 500 mg Tab Take 250 mg by mouth. temazepam 30 mg capsule Commonly known as:  RESTORIL  
TAKE ONE CAPSULE BY MOUTH AT BEDTIME AS NEEDED FOR SLEEP  
  
 topiramate 50 mg tablet Commonly known as:  TOPAMAX Take 1 Tab by mouth two (2) times a day. Prescriptions Sent to Pharmacy Refills  
 topiramate (TOPAMAX) 50 mg tablet 1 Sig: Take 1 Tab by mouth two (2) times a day. Class: Normal  
 Pharmacy: [facility]/pharmacy #[id number]- [address]  #: [phone] Route: Oral  
  
Follow-up Instructions Return in about 3 months (around 1/16/2018). Introducing [facility] & HE[facility] SERVICES! Dear Emy Fischer: 
Thank you for requesting a [employer] account. Our records indicate that you already have an active [employer] account. You can access your account anytime at [email or web address] Did you know that you can access your hospital and ER discharge instructions at any time in [employer]? You can also review all of your test results from your hospital stay or ER visit. Additional Information If you have questions, please visit the Frequently Asked Questions section of the [employer] website at [email or web address] Remember, [employer] is NOT to be used for urgent needs. For medical emergencies, dial 911. Now available from your iPhone and Android! Please provide this summary of care documentation to your next provider. Your primary care clinician is listed as Phys Other. If you have any questions after today's visit, please call [phone].

## 2017-10-16 NOTE — LETTER
10/16/2017 Patient:  Dominguez Urbina YOB: 1960 Date of Visit: 10/16/2017 Dear Delayne Kawasaki, MD 
[address] [id number] VIA Facsimile: [phone] Magalie Dow MD 
[facility] [address] [id number] VIA Facsimile: [phone] 
 : I was requested by Demetrius Rubin MD to evaluate Ms. Dominguez Urbina  for Chief Complaint Patient presents with  Concussion f/u  Headache  
  f/u noise with headache Geraldine Kolton I am recommending the following:  
 
Diagnoses and all orders for this visit: 
 
1. Chronic tension-type headache, intractable 2. Anxiety Other orders -     topiramate (TOPAMAX) 50 mg tablet; Take 1 Tab by mouth two (2) times a day. 
 
 
 
---------------------------------------------------------------------------------------------------------------------- Below is my encounter: Chief Complaint Patient presents with  Concussion f/u  Headache  
  f/u noise with headache HPI Mrs. Chris Wade is a 53-year-old woman here to follow-up. I began evaluating her after she suffered a concussion while mountain biking. She sees psychiatry long-term for depression and anxiety. A lot of endocrine abnormalities ongoing currently requiring medication and correction. She saw [facility] since our last visit and had her right internal carotid artery aneurysm repaired with coiling and stenting. She is on Plavix and aspirin currently. Last visit we initiated topiramate for headache. Unfortunately the headaches remain daily and tend to be right sided throbbing. Minimal side effects from topiramate in the form of paresthesias. Review of Systems Neurological: Positive for headaches. Psychiatric/Behavioral: The patient is nervous/anxious. All other systems reviewed and are negative. Past Medical History:  
Diagnosis Date  Spinal stenosis  Thyroid disease   
 hypothyroid No family history on file. Social History Social History  Marital status:  Spouse name: N/A  
 Number of children: N/A  
 Years of education: N/A Occupational History  Not on file. Social History Main Topics  Smoking status: Never Smoker  Smokeless tobacco: Never Used  Alcohol use Yes Comment: socially  Drug use: No  
 Sexual activity: Not on file Other Topics Concern  Not on file Social History Narrative Allergies Allergen Reactions  Percocet [Oxycodone-Acetaminophen] Itching Current Outpatient Prescriptions Medication Sig  
 busPIRone (BUSPAR) 5 mg tablet TAKE 1/2 TO 1 TABLET BY MOUTH AS NEEDED FOR ANXIETY UP TO TWICE DAILY  topiramate (TOPAMAX) 50 mg tablet Take 1 Tab by mouth two (2) times a day.  amitriptyline (ELAVIL) 25 mg tablet Take 1 Tab by mouth nightly.  ALPRAZolam (XANAX) 1 mg tablet TAKE 1/2-1 TABLET ORALLY DAILY AS NEEDED  temazepam (RESTORIL) 30 mg capsule TAKE ONE CAPSULE BY MOUTH AT BEDTIME AS NEEDED FOR SLEEP  
 FLUoxetine (PROZAC) 10 mg tablet Take 20 mg by mouth daily.  dextroamphetamine-amphetamine (ADDERALL) 30 mg tablet Take 30 mg by mouth daily. Dextroamphetamine-amphetamine 30 mg tab administration instructions: Take one tab orally every morning and 1/2 tab (15 mg) every afternoon)  levothyroxine (SYNTHROID) 25 mcg tablet Take 75 mcg by mouth Daily (before breakfast).  cyclobenzaprine (FLEXERIL) 5 mg tablet Take 5 mg by mouth.  magnesium oxide 500 mg tab Take 250 mg by mouth. No current facility-administered medications for this visit. Neurologic Exam  
 
Mental Status WD/WN adult in NAD, normal grooming VSS 
A&O x 3 PERRL, nonicteric Face is symmetric, tongue midline Speech is fluent and clear No limb ataxia. No abnl movements. Moving all extemities spontaneously and symmetric Normal gait CVS RRR Lungs nonlabored Skin is warm and dry Visit Vitals  /76  Pulse 86  Resp 18  Ht 4' 11\" (1.499 m)  Wt 55.2 kg (121 lb 9.6 oz)  LMP 04/22/2013  SpO2 98%  BMI 24.56 kg/m2 Assessment and Plan Diagnoses and all orders for this visit: 
 
1. Chronic tension-type headache, intractable 2. Anxiety Other orders -     topiramate (TOPAMAX) 50 mg tablet; Take 1 Tab by mouth two (2) times a day. 44-year-old woman with history of concussion having persistent headaches. I am suspicious that her headaches could also be influenced by her other complicated medical/psy conditions. I am going to titrate topiramate 50 mg twice daily. Side effects were discussed with her. Continue diligent management of her endocrine issues. Continue to see psychiatry as well. I will see her in 3 months. Thank you for giving me the opportunity to assist in the care of Ms. Danna Bello. If you have questions, please do not hesitate to contact me. Sincerely, [facility], DO Neurologist 
Diplomate EALR

## 2017-10-16 NOTE — PROGRESS NOTES
Chief Complaint   Patient presents with    Concussion     f/u    Headache     f/u noise with headache       HPI    Mrs. Charissa Allan is a 62-year-old woman here to follow-up. I began evaluating her after she suffered a concussion while mountain biking. She sees psychiatry long-term for depression and anxiety. A lot of endocrine abnormalities ongoing currently requiring medication and correction. She saw [facility] since our last visit and had her right internal carotid artery aneurysm repaired with coiling and stenting. She is on Plavix and aspirin currently. Last visit we initiated topiramate for headache. Unfortunately the headaches remain daily and tend to be right sided throbbing. Minimal side effects from topiramate in the form of paresthesias. Review of Systems   Neurological: Positive for headaches. Psychiatric/Behavioral: The patient is nervous/anxious. All other systems reviewed and are negative. Past Medical History:   Diagnosis Date    Spinal stenosis     Thyroid disease     hypothyroid     No family history on file. Social History     Social History    Marital status:      Spouse name: N/A    Number of children: N/A    Years of education: N/A     Occupational History    Not on file. Social History Main Topics    Smoking status: Never Smoker    Smokeless tobacco: Never Used    Alcohol use Yes      Comment: socially    Drug use: No    Sexual activity: Not on file     Other Topics Concern    Not on file     Social History Narrative     Allergies   Allergen Reactions    Percocet [Oxycodone-Acetaminophen] Itching         Current Outpatient Prescriptions   Medication Sig    busPIRone (BUSPAR) 5 mg tablet TAKE 1/2 TO 1 TABLET BY MOUTH AS NEEDED FOR ANXIETY UP TO TWICE DAILY    topiramate (TOPAMAX) 50 mg tablet Take 1 Tab by mouth two (2) times a day.  amitriptyline (ELAVIL) 25 mg tablet Take 1 Tab by mouth nightly.     ALPRAZolam (XANAX) 1 mg tablet TAKE 1/2-1 TABLET ORALLY DAILY AS NEEDED    temazepam (RESTORIL) 30 mg capsule TAKE ONE CAPSULE BY MOUTH AT BEDTIME AS NEEDED FOR SLEEP    FLUoxetine (PROZAC) 10 mg tablet Take 20 mg by mouth daily.  dextroamphetamine-amphetamine (ADDERALL) 30 mg tablet Take 30 mg by mouth daily. Dextroamphetamine-amphetamine 30 mg tab administration instructions: Take one tab orally every morning and 1/2 tab (15 mg) every afternoon)    levothyroxine (SYNTHROID) 25 mcg tablet Take 75 mcg by mouth Daily (before breakfast).  cyclobenzaprine (FLEXERIL) 5 mg tablet Take 5 mg by mouth.  magnesium oxide 500 mg tab Take 250 mg by mouth. No current facility-administered medications for this visit. Neurologic Exam     Mental Status        WD/WN adult in NAD, normal grooming  VSS  A&O x 3    PERRL, nonicteric  Face is symmetric, tongue midline  Speech is fluent and clear  No limb ataxia. No abnl movements. Moving all extemities spontaneously and symmetric  Normal gait    CVS RRR  Lungs nonlabored  Skin is warm and dry         Visit Vitals    /76    Pulse 86    Resp 18    Ht 4' 11\" (1.499 m)    Wt 55.2 kg (121 lb 9.6 oz)    LMP 04/22/2013    SpO2 98%    BMI 24.56 kg/m2       Assessment and Plan   Diagnoses and all orders for this visit:    1. Chronic tension-type headache, intractable    2. Anxiety    Other orders  -     topiramate (TOPAMAX) 50 mg tablet; Take 1 Tab by mouth two (2) times a day. 51-year-old woman with history of concussion having persistent headaches. I am suspicious that her headaches could also be influenced by her other complicated medical/psy conditions. I am going to titrate topiramate 50 mg twice daily. Side effects were discussed with her. Continue diligent management of her endocrine issues. Continue to see psychiatry as well. I will see her in 3 months.         [address] [facility], [address]  Diplomate NATHALIEN

## 2017-10-16 NOTE — COMMUNICATION BODY
Chief Complaint   Patient presents with    Concussion     f/u    Headache     f/u noise with headache       HPI    Mrs. Duane Boatman is a 56-year-old woman here to follow-up. I began evaluating her after she suffered a concussion while mountain biking. She sees psychiatry long-term for depression and anxiety. A lot of endocrine abnormalities ongoing currently requiring medication and correction. She saw [facility] since our last visit and had her right internal carotid artery aneurysm repaired with coiling and stenting. She is on Plavix and aspirin currently. Last visit we initiated topiramate for headache. Unfortunately the headaches remain daily and tend to be right sided throbbing. Minimal side effects from topiramate in the form of paresthesias. Review of Systems   Neurological: Positive for headaches. Psychiatric/Behavioral: The patient is nervous/anxious. All other systems reviewed and are negative. Past Medical History:   Diagnosis Date    Spinal stenosis     Thyroid disease     hypothyroid     No family history on file. Social History     Social History    Marital status:      Spouse name: N/A    Number of children: N/A    Years of education: N/A     Occupational History    Not on file. Social History Main Topics    Smoking status: Never Smoker    Smokeless tobacco: Never Used    Alcohol use Yes      Comment: socially    Drug use: No    Sexual activity: Not on file     Other Topics Concern    Not on file     Social History Narrative     Allergies   Allergen Reactions    Percocet [Oxycodone-Acetaminophen] Itching         Current Outpatient Prescriptions   Medication Sig    busPIRone (BUSPAR) 5 mg tablet TAKE 1/2 TO 1 TABLET BY MOUTH AS NEEDED FOR ANXIETY UP TO TWICE DAILY    topiramate (TOPAMAX) 50 mg tablet Take 1 Tab by mouth two (2) times a day.  amitriptyline (ELAVIL) 25 mg tablet Take 1 Tab by mouth nightly.     ALPRAZolam (XANAX) 1 mg tablet TAKE 1/2-1 TABLET ORALLY DAILY AS NEEDED    temazepam (RESTORIL) 30 mg capsule TAKE ONE CAPSULE BY MOUTH AT BEDTIME AS NEEDED FOR SLEEP    FLUoxetine (PROZAC) 10 mg tablet Take 20 mg by mouth daily.  dextroamphetamine-amphetamine (ADDERALL) 30 mg tablet Take 30 mg by mouth daily. Dextroamphetamine-amphetamine 30 mg tab administration instructions: Take one tab orally every morning and 1/2 tab (15 mg) every afternoon)    levothyroxine (SYNTHROID) 25 mcg tablet Take 75 mcg by mouth Daily (before breakfast).  cyclobenzaprine (FLEXERIL) 5 mg tablet Take 5 mg by mouth.  magnesium oxide 500 mg tab Take 250 mg by mouth. No current facility-administered medications for this visit. Neurologic Exam     Mental Status        WD/WN adult in NAD, normal grooming  VSS  A&O x 3    PERRL, nonicteric  Face is symmetric, tongue midline  Speech is fluent and clear  No limb ataxia. No abnl movements. Moving all extemities spontaneously and symmetric  Normal gait    CVS RRR  Lungs nonlabored  Skin is warm and dry         Visit Vitals    /76    Pulse 86    Resp 18    Ht 4' 11\" (1.499 m)    Wt 55.2 kg (121 lb 9.6 oz)    LMP 04/22/2013    SpO2 98%    BMI 24.56 kg/m2       Assessment and Plan   Diagnoses and all orders for this visit:    1. Chronic tension-type headache, intractable    2. Anxiety    Other orders  -     topiramate (TOPAMAX) 50 mg tablet; Take 1 Tab by mouth two (2) times a day. 16-year-old woman with history of concussion having persistent headaches. I am suspicious that her headaches could also be influenced by her other complicated medical/psy conditions. I am going to titrate topiramate 50 mg twice daily. Side effects were discussed with her. Continue diligent management of her endocrine issues. Continue to see psychiatry as well. I will see her in 3 months.         Angela Shipman, [address]  Diplomate ABPN

## 2018-01-16 ENCOUNTER — OFFICE VISIT (OUTPATIENT)
Dept: NEUROLOGY | Age: 58
End: 2018-01-16

## 2018-01-16 VITALS
BODY MASS INDEX: 25.8 KG/M2 | HEIGHT: 59 IN | RESPIRATION RATE: 18 BRPM | DIASTOLIC BLOOD PRESSURE: 80 MMHG | OXYGEN SATURATION: 98 % | HEART RATE: 103 BPM | WEIGHT: 128 LBS | SYSTOLIC BLOOD PRESSURE: 118 MMHG

## 2018-01-16 DIAGNOSIS — G43.709 CHRONIC MIGRAINE W/O AURA W/O STATUS MIGRAINOSUS, NOT INTRACTABLE: ICD-10-CM
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
## 2018-01-16 PROBLEM — F33.9 RECURRENT DEPRESSION (HCC): Status: ACTIVE | Noted: 2018-01-16

## 2018-01-16 NOTE — MR AVS SNAPSHOT
[address] 
[phone] Patient: Linda Yao MRN: [id number] :1960 Visit Information Date & Time Provider Department Dept. Phone Encounter #  
 2018  8:40 AM Brian López DO [facility] at [address] [id number] Follow-up Instructions Return in about 3 months (around 2018). Upcoming Health Maintenance Date Due Hepatitis C Screening 1960 DTaP/Tdap/Td series (1 - Tdap) 1983 PAP AKA CERVICAL CYTOLOGY 1983 FOBT Q 1 YEAR AGE 50-75 2012 Influenza Age 5 to Adult 2017 BREAST CANCER SCRN MAMMOGRAM 3/16/2019 Allergies as of 2018  Review Complete On: 2018 By: Adria Bernal Severity Noted Reaction Type Reactions Percocet [Oxycodone-acetaminophen]  2013    Itching Current Immunizations  Never Reviewed Name Date Influenza Vaccine (Quad) PF 3/13/2017  9:39 AM  
  
 Not reviewed this visit You Were Diagnosed With   
  
 Codes Comments Chronic migraine w/o aura w/o status migrainosus, not intractable    -  Primary ICD-10-CM: Y63.653 ICD-9-CM: 346.70 Vitals BP Pulse Resp Height(growth percentile) Weight(growth percentile) LMP  
 118/80 (BP 1 Location: Left arm, BP Patient Position: Sitting) (!) 103 18 4' 11\" (1.499 m) 128 lb (58.1 kg) 2013 SpO2 BMI OB Status Smoking Status 98% 25.85 kg/m2 Postmenopausal Never Smoker Vitals History BMI and BSA Data Body Mass Index Body Surface Area  
 25.85 kg/m 2 1.56 m 2 Preferred Pharmacy Pharmacy Name Phone [id number], [address] [phone] Your Updated Medication List  
  
   
This list is accurate as of: 18  8:59 AM.  Always use your most recent med list.  
  
  
  
  
 ALPRAZolam 1 mg tablet Commonly known as:  XANAX  
TAKE 1/2-1 TABLET ORALLY DAILY AS NEEDED  
  
 amitriptyline 25 mg tablet Commonly known as:  ELAVIL Take 1 Tab by mouth nightly. busPIRone 5 mg tablet Commonly known as:  BUSPAR  
TAKE 1/2 TO 1 TABLET BY MOUTH AS NEEDED FOR ANXIETY UP TO TWICE DAILY  
  
 cyclobenzaprine 5 mg tablet Commonly known as:  FLEXERIL Take 5 mg by mouth. dextroamphetamine-amphetamine 30 mg tablet Commonly known as:  ADDERALL Take 30 mg by mouth daily. Dextroamphetamine-amphetamine 30 mg tab administration instructions: Take one tab orally every morning and 1/2 tab (15 mg) every afternoon) FLUoxetine 10 mg tablet Commonly known as:  PROzac Take 20 mg by mouth daily. levothyroxine 25 mcg tablet Commonly known as:  SYNTHROID Take 75 mcg by mouth Daily (before breakfast). magnesium oxide 500 mg Tab Take 250 mg by mouth. temazepam 30 mg capsule Commonly known as:  RESTORIL  
TAKE ONE CAPSULE BY MOUTH AT BEDTIME AS NEEDED FOR SLEEP  
  
 * topiramate 50 mg tablet Commonly known as:  TOPAMAX Take 1 Tab by mouth two (2) times a day. * topiramate  mg capsule Commonly known as:  TROKENDI XR Take 1 Cap by mouth nightly. * Notice: This list has 2 medication(s) that are the same as other medications prescribed for you. Read the directions carefully, and ask your doctor or other care provider to review them with you. Prescriptions Printed Refills  
 topiramate ER (TROKENDI XR) 100 mg capsule 11 Sig: Take 1 Cap by mouth nightly. Class: Print Route: Oral  
  
Follow-up Instructions Return in about 3 months (around 4/16/2018). Patient Instructions Seizure: Care Instructions Your Care Instructions Seizures are caused by abnormal patterns of electrical signals in the brain. They are different for each person. Seizures can affect movement, speech, vision, or awareness. Some people have only slight shaking of a hand and do not pass out. Other people may pass out and have violent shaking of the whole body. Some people appear to stare into space. They are awake, but they can't respond normally. Later, they may not remember what happened. You may need tests to identify the type and cause of the seizures. A seizure may occur only once, or you may have them more than one time. Taking medicines as directed and following up with your doctor may help keep you from having more seizures. The doctor has checked you carefully, but problems can develop later. If you notice any problems or new symptoms, get medical treatment right away. Follow-up care is a key part of your treatment and safety. Be sure to make and go to all appointments, and call your doctor if you are having problems. It's also a good idea to know your test results and keep a list of the medicines you take. How can you care for yourself at home? · Be safe with medicines. Take your medicines exactly as prescribed. Call your doctor if you think you are having a problem with your medicine. · Do not do any activity that could be dangerous to you or others until your doctor says it is safe to do so. For example, do not drive a car, operate machinery, swim, or climb ladders. · Be sure that anyone treating you for any health problem knows that you have had a seizure and what medicines you are taking for it. · Identify and avoid things that may make you more likely to have a seizure. These may include lack of sleep, alcohol or drug use, stress, or not eating. · Make sure you go to your follow-up appointment. When should you call for help? Call 911 anytime you think you may need emergency care. For example, call if: 
? · You have another seizure. ? · You have more than one seizure in 24 hours. ? · You have new symptoms, such as trouble walking, speaking, or thinking clearly. ?Call your doctor now or seek immediate medical care if: 
? · You are not acting normally. ? Watch closely for changes in your health, and be sure to contact your doctor if you have any problems. Where can you learn more? Go to http://andrew-rocky.info/. Enter B347 in the search box to learn more about \"Seizure: Care Instructions. \" Current as of: October 14, 2016 Content Version: 11.4 © 2624-0645 [employer]. Care instructions adapted under license by [employer] (which disclaims liability or warranty for this information). If you have questions about a medical condition or this instruction, always ask your healthcare professional. [address] any warranty or liability for your use of this information. Introducing [facility]! Dear Randolph Warner 0063: 
Thank you for requesting a Awesome[employer] account. Our records indicate that you already have an active AwesomeTouch account. You can access your account anytime at [email or web address] Did you know that you can access your hospital and ER discharge instructions at any time in [employer]Touch? You can also review all of your test results from your hospital stay or ER visit. Additional Information If you have questions, please visit the Frequently Asked Questions section of the Awesome[employer] website at [email or web address] Remember, AwesomeTouch is NOT to be used for urgent needs. For medical emergencies, dial 911. Now available from your iPhone and Android! Please provide this summary of care documentation to your next provider. Your primary care clinician is listed as Phys Other. If you have any questions after today's visit, please call [phone].

## 2018-01-16 NOTE — PROGRESS NOTES
Pt here for follow up. She states she has a headache today. Pt does not have an advanced directive and will discuss with her pcp.

## 2018-01-16 NOTE — PROGRESS NOTES
Chief Complaint   Patient presents with    Follow-up    Migraine       HPI    Mrs. Darien Fowler is a 56-year-old woman here to follow-up. I follow her for persistent chronic posttraumatic headaches after concussion. She still struggles with family stressors ongoing daily. Last visit we increased topiramate to 50 mg twice daily however she is suffering from cognitive slowing and memory loss because of the medication so she is taking it all at bedtime exclusively. She thinks it has been helpful with reducing headache frequency and intensity but the side effects are starting to become a significant obstacle. She continues to struggle with imbalance since her concussion. She persistently has right tinnitus. Recent stumble and fall because of imbalance. History of aneurysm coiling on Plavix and aspirin currently. Review of Systems   Neurological: Positive for tingling, sensory change and headaches. Psychiatric/Behavioral: Positive for depression and memory loss. The patient is nervous/anxious and has insomnia. All other systems reviewed and are negative. Past Medical History:   Diagnosis Date    Anxiety disorder     Depression     Headache     Spinal stenosis     Thyroid disease     hypothyroid     History reviewed. No pertinent family history. Social History     Social History    Marital status:      Spouse name: N/A    Number of children: N/A    Years of education: N/A     Occupational History    Not on file.      Social History Main Topics    Smoking status: Never Smoker    Smokeless tobacco: Never Used    Alcohol use 1.2 oz/week     2 Glasses of wine per week      Comment: socially    Drug use: No    Sexual activity: Not on file     Other Topics Concern    Not on file     Social History Narrative     Allergies   Allergen Reactions    Percocet [Oxycodone-Acetaminophen] Itching         Current Outpatient Prescriptions   Medication Sig    topiramate ER (TROKENDI XR) 100 mg capsule Take 1 Cap by mouth nightly.  topiramate ER (TROKENDI XR) 100 mg capsule Take 1 Cap by mouth daily.  busPIRone (BUSPAR) 5 mg tablet TAKE 1/2 TO 1 TABLET BY MOUTH AS NEEDED FOR ANXIETY UP TO TWICE DAILY    topiramate (TOPAMAX) 50 mg tablet Take 1 Tab by mouth two (2) times a day.  amitriptyline (ELAVIL) 25 mg tablet Take 1 Tab by mouth nightly.  cyclobenzaprine (FLEXERIL) 5 mg tablet Take 5 mg by mouth.  magnesium oxide 500 mg tab Take 250 mg by mouth.  ALPRAZolam (XANAX) 1 mg tablet TAKE 1/2-1 TABLET ORALLY DAILY AS NEEDED    temazepam (RESTORIL) 30 mg capsule TAKE ONE CAPSULE BY MOUTH AT BEDTIME AS NEEDED FOR SLEEP    FLUoxetine (PROZAC) 10 mg tablet Take 20 mg by mouth daily.  dextroamphetamine-amphetamine (ADDERALL) 30 mg tablet Take 30 mg by mouth daily. Dextroamphetamine-amphetamine 30 mg tab administration instructions: Take one tab orally every morning and 1/2 tab (15 mg) every afternoon)    levothyroxine (SYNTHROID) 25 mcg tablet Take 75 mcg by mouth Daily (before breakfast). No current facility-administered medications for this visit. Neurologic Exam     Mental Status        WD/WN adult in NAD, normal grooming  VSS  A&O x 3    PERRL, nonicteric  Face is symmetric, tongue midline, slight ecchymosis to the bridge of her nose  Speech is fluent and clear  No limb ataxia. No abnl movements. Moving all extemities spontaneously and symmetric  Normal gait    CVS RRR  Lungs nonlabored  Skin is warm and dry         Visit Vitals    /80 (BP 1 Location: Left arm, BP Patient Position: Sitting)    Pulse (!) 103    Resp 18    Ht 4' 11\" (1.499 m)    Wt 58.1 kg (128 lb)    LMP 04/22/2013    SpO2 98%    BMI 25.85 kg/m2       Assessment and Plan   Diagnoses and all orders for this visit:    1. Fall, initial encounter    2. Chronic migraine w/o aura w/o status migrainosus, not intractable    Other orders  -     topiramate ER (TROKENDI XR) 100 mg capsule;  Take 1 Cap by mouth nightly. -     topiramate ER (TROKENDI XR) 100 mg capsule; Take 1 Cap by mouth daily. 81-year-old woman with chronic post traumatic headache/chronic migraine that is continued to be exacerbated since her concussion. She also had a fall recently due to her vertiginous symptoms since her head injury. Topiramate giving her some relief but significant side effects. I am going to switch to Trokendi once daily formulation with the intent to reduce her side effect profile but still give her relief. Continue other medications to include Elavil and magnesium. I would like to see her in 3 months.         2 [facility], [address]  Diplomate NATHALIEN

## 2018-01-16 NOTE — PATIENT INSTRUCTIONS
Seizure: Care Instructions  Your Care Instructions    Seizures are caused by abnormal patterns of electrical signals in the brain. They are different for each person. Seizures can affect movement, speech, vision, or awareness. Some people have only slight shaking of a hand and do not pass out. Other people may pass out and have violent shaking of the whole body. Some people appear to stare into space. They are awake, but they can't respond normally. Later, they may not remember what happened. You may need tests to identify the type and cause of the seizures. A seizure may occur only once, or you may have them more than one time. Taking medicines as directed and following up with your doctor may help keep you from having more seizures. The doctor has checked you carefully, but problems can develop later. If you notice any problems or new symptoms, get medical treatment right away. Follow-up care is a key part of your treatment and safety. Be sure to make and go to all appointments, and call your doctor if you are having problems. It's also a good idea to know your test results and keep a list of the medicines you take. How can you care for yourself at home? · Be safe with medicines. Take your medicines exactly as prescribed. Call your doctor if you think you are having a problem with your medicine. · Do not do any activity that could be dangerous to you or others until your doctor says it is safe to do so. For example, do not drive a car, operate machinery, swim, or climb ladders. · Be sure that anyone treating you for any health problem knows that you have had a seizure and what medicines you are taking for it. · Identify and avoid things that may make you more likely to have a seizure. These may include lack of sleep, alcohol or drug use, stress, or not eating. · Make sure you go to your follow-up appointment. When should you call for help? Call 911 anytime you think you may need emergency care. For example, call if:  ? · You have another seizure. ? · You have more than one seizure in 24 hours. ? · You have new symptoms, such as trouble walking, speaking, or thinking clearly. ?Call your doctor now or seek immediate medical care if:  ? · You are not acting normally. ? Watch closely for changes in your health, and be sure to contact your doctor if you have any problems. Where can you learn more? Go to http://andrew-rocky.info/. Enter R682 in the search box to learn more about \"Seizure: Care Instructions. \"  Current as of: October 14, 2016  Content Version: 11.4  © 3143-6895 [employer]. Care instructions adapted under license by [employer] (which disclaims liability or warranty for this information). If you have questions about a medical condition or this instruction, always ask your healthcare professional. [address] any warranty or liability for your use of this information.

## 2018-03-15 DIAGNOSIS — R51.9 HEADACHE, CHRONIC DAILY: ICD-10-CM

## 2018-03-16 RX ORDER — AMITRIPTYLINE HYDROCHLORIDE 25 MG/1
TABLET, FILM COATED ORAL
Qty: 30 TAB | Refills: 5 | Status: SHIPPED | OUTPATIENT
Start: 2018-03-16 | End: 2018-04-16

## 2018-04-16 ENCOUNTER — OFFICE VISIT (OUTPATIENT)
Dept: NEUROLOGY | Age: 58
End: 2018-04-16

## 2018-04-16 VITALS — WEIGHT: 125 LBS | BODY MASS INDEX: 25.25 KG/M2 | OXYGEN SATURATION: 98 % | RESPIRATION RATE: 18 BRPM

## 2018-04-16 DIAGNOSIS — F09 COGNITIVE DISORDER: ICD-10-CM

## 2018-04-16 DIAGNOSIS — G43.709 CHRONIC MIGRAINE W/O AURA W/O STATUS MIGRAINOSUS, NOT INTRACTABLE: Primary | ICD-10-CM

## 2018-04-16 RX ORDER — METHOCARBAMOL 750 MG/1
750 TABLET, FILM COATED ORAL
COMMUNITY
End: 2018-10-22

## 2018-04-16 RX ORDER — GUAIFENESIN 100 MG/5ML
81 LIQUID (ML) ORAL DAILY
COMMUNITY
End: 2019-09-30

## 2018-04-16 RX ORDER — ACETAMINOPHEN 325 MG/1
650 TABLET ORAL
COMMUNITY
End: 2019-09-30

## 2018-04-16 NOTE — MR AVS SNAPSHOT
[address] 
[phone] Patient: Padmini Rogers MRN: [id number] :1960 Visit Information Date & Time Provider Department Dept. Phone Encounter #  
 2018  9:40 AM Ham Tafoya DO [facility] at [address] [id number] Follow-up Instructions Return in about 3 months (around 2018), or and for botox once approved. Routing History Follow-up and Disposition History Your Appointments 2018 10:00 AM  
PROCEDURE with Mely Bolivar DO [facility] at [facility] Appt Note: Botox Injections [address] [place] [address]  
[address]  [address]  
  
    
 2018 10:20 AM  
Follow Up with José Lopez DO [facility] at [facility] Appt Note: 3 month f/u headache  
 [address] [place] [address]  
[phone]  
  
   
 [address]  [address] Upcoming Health Maintenance Date Due Hepatitis C Screening 1960 DTaP/Tdap/Td series (1 - Tdap) 1983 PAP AKA CERVICAL CYTOLOGY 1983 FOBT Q 1 YEAR AGE 50-75 2012 Influenza Age 5 to Adult 2017 BREAST CANCER SCRN MAMMOGRAM 3/16/2019 Allergies as of 2018  Review Complete On: 2018 By: Janett Lopez DO Severity Noted Reaction Type Reactions Percocet [Oxycodone-acetaminophen]  2013    Itching Current Immunizations  Never Reviewed Name Date Influenza Vaccine (Quad) PF 3/13/2017  9:39 AM  
  
 Not reviewed this visit You Were Diagnosed With   
  
 Codes Comments  Chronic migraine w/o aura w/o status migrainosus, not intractable    - Primary ICD-10-CM: W22.570 ICD-9-CM: 346.70 Cognitive disorder     ICD-10-CM: F09 ICD-9-CM: 294.9 Vitals Resp Weight(growth percentile) LMP SpO2 BMI OB Status 18 125 lb (56.7 kg) 04/22/2013 98% 25.25 kg/m2 Postmenopausal  
 Smoking Status Never Smoker BMI and BSA Data Body Mass Index Body Surface Area  
 25.25 kg/m 2 1.54 m 2 Preferred Pharmacy Pharmacy Name Phone [id number] [address] [phone] Your Updated Medication List  
  
   
This list is accurate as of 4/16/18 11:59 PM.  Always use your most recent med list.  
  
  
  
  
 ALPRAZolam 1 mg tablet Commonly known as:  XANAX  
TAKE 1/2-1 TABLET ORALLY DAILY AS NEEDED AMBIEN PO Take  by mouth. aspirin 81 mg chewable tablet Take 81 mg by mouth daily. busPIRone 5 mg tablet Commonly known as:  BUSPAR  
TAKE 1/2 TO 1 TABLET BY MOUTH AS NEEDED FOR ANXIETY UP TO TWICE DAILY CHLORZOXAZONE PO Take  by mouth. cyclobenzaprine 5 mg tablet Commonly known as:  FLEXERIL Take 5 mg by mouth. dextroamphetamine-amphetamine 30 mg tablet Commonly known as:  ADDERALL Take 30 mg by mouth daily. Dextroamphetamine-amphetamine 30 mg tab administration instructions: Take one tab orally every morning and 1/2 tab (15 mg) every afternoon) FLUoxetine 10 mg tablet Commonly known as:  PROzac Take 20 mg by mouth daily. levothyroxine 25 mcg tablet Commonly known as:  SYNTHROID Take 75 mcg by mouth Daily (before breakfast). magnesium oxide 500 mg Tab Take 250 mg by mouth. methocarbamol 750 mg tablet Commonly known as:  ROBAXIN Take  by mouth four (4) times daily. onabotulinumtoxinA 200 unit injection Commonly known as:  BOTOX  
200 Units by IntraMUSCular route every three (3) months.  Inject selected muscles head and neck bilaterally every 3 months  Indications: MIGRAINE PREVENTION  
  
 TYLENOL 325 mg tablet Generic drug:  acetaminophen Take  by mouth every four (4) hours as needed for Pain. Prescriptions Printed Refills  
 onabotulinumtoxinA (BOTOX) 200 unit injection 3 Si Units by IntraMUSCular route every three (3) months. Inject selected muscles head and neck bilaterally every 3 months  Indications: MIGRAINE PREVENTION Class: Print Route: IntraMUSCular Follow-up Instructions Return in about 3 months (around 2018), or and for botox once approved. Patient Instructions A Healthy Lifestyle: Care Instructions Your Care Instructions A healthy lifestyle can help you feel good, stay at a healthy weight, and have plenty of energy for both work and play. A healthy lifestyle is something you can share with your whole family. A healthy lifestyle also can lower your risk for serious health problems, such as high blood pressure, heart disease, and diabetes. You can follow a few steps listed below to improve your health and the health of your family. Follow-up care is a key part of your treatment and safety. Be sure to make and go to all appointments, and call your doctor if you are having problems. It's also a good idea to know your test results and keep a list of the medicines you take. How can you care for yourself at home? · Do not eat too much sugar, fat, or fast foods. You can still have dessert and treats now and then. The goal is moderation. · Start small to improve your eating habits. Pay attention to portion sizes, drink less juice and soda pop, and eat more fruits and vegetables. ¨ Eat a healthy amount of food. A 3-ounce serving of meat, for example, is about the size of a deck of cards. Fill the rest of your plate with vegetables and whole grains. ¨ Limit the amount of soda and sports drinks you have every day.  Drink more water when you are thirsty. ¨ Eat at least 5 servings of fruits and vegetables every day. It may seem like a lot, but it is not hard to reach this goal. A serving or helping is 1 piece of fruit, 1 cup of vegetables, or 2 cups of leafy, raw vegetables. Have an apple or some carrot sticks as an afternoon snack instead of a candy bar. Try to have fruits and/or vegetables at every meal. 
· Make exercise part of your daily routine. You may want to start with simple activities, such as walking, bicycling, or slow swimming. Try to be active 30 to 60 minutes every day. You do not need to do all 30 to 60 minutes all at once. For example, you can exercise 3 times a day for 10 or 20 minutes. Moderate exercise is safe for most people, but it is always a good idea to talk to your doctor before starting an exercise program. 
· Keep moving. Bryson Cue the lawn, work in the garden, or [employer]. Take the stairs instead of the elevator at work. · If you smoke, quit. People who smoke have an increased risk for heart attack, stroke, cancer, and other lung illnesses. Quitting is hard, but there are ways to boost your chance of quitting tobacco for good. ¨ Use nicotine gum, patches, or lozenges. ¨ Ask your doctor about stop-smoking programs and medicines. ¨ Keep trying. In addition to reducing your risk of diseases in the future, you will notice some benefits soon after you stop using tobacco. If you have shortness of breath or asthma symptoms, they will likely get better within a few weeks after you quit. · Limit how much alcohol you drink. Moderate amounts of alcohol (up to 2 drinks a day for men, 1 drink a day for women) are okay. But drinking too much can lead to liver problems, high blood pressure, and other health problems. Family health If you have a family, there are many things you can do together to improve your health. · Eat meals together as a family as often as possible. · Eat healthy foods. This includes fruits, vegetables, lean meats and dairy, and whole grains. · Include your family in your fitness plan. Most people think of activities such as jogging or tennis as the way to fitness, but there are many ways you and your family can be more active. Anything that makes you breathe hard and gets your heart pumping is exercise. Here are some tips: 
¨ Walk to do errands or to take your child to school or the bus. ¨ Go for a family bike ride after dinner instead of watching TV. Where can you learn more? Go to http://andrew-rocky.info/. Enter L586 in the search box to learn more about \"A Healthy Lifestyle: Care Instructions. \" Current as of: May 12, 2017 Content Version: 11.4 © 9985-1502 [employer]. Care instructions adapted under license by [employer] (which disclaims liability or warranty for this information). If you have questions about a medical condition or this instruction, always ask your healthcare professional. [place] [address] any warranty or liability for your use of this information. Introducing [facility]! Dear Jonathon Aguila: 
Thank you for requesting a Screenburn account. Our records indicate that you already have an active Screenburn account. You can access your account anytime at [email or web address] Did you know that you can access your hospital and ER discharge instructions at any time in Screenburn? You can also review all of your test results from your hospital stay or ER visit. Additional Information If you have questions, please visit the Frequently Asked Questions section of the Screenburn website at [email or web address] Remember, Screenburn is NOT to be used for urgent needs. For medical emergencies, dial 911. Now available from your iPhone and Android! Please provide this summary of care documentation to your next provider. Your primary care clinician is listed as Preethi Spears. If you have any questions after today's visit, please call [phone].

## 2018-04-16 NOTE — PATIENT INSTRUCTIONS

## 2018-04-16 NOTE — LETTER
4/16/2018 Patient:  Jesus Teresa YOB: 1960 Date of Visit: 4/16/2018 Dear Lili Wright MD 
[address] [address] VIA Facsimile: [phone] Nathen Ramsay MD 
[address] [address] VIA Facsimile: [phone] 
 : I was requested by Lili Wright MD to evaluate Ms. Jesus Teresa  for Chief Complaint Patient presents with  Migraine Frutoso Golder I am recommending the following:  
 
Diagnoses and all orders for this visit: 
 
1. Chronic migraine w/o aura w/o status migrainosus, not intractable 2. Cognitive disorder Other orders 
-     onabotulinumtoxinA (BOTOX) 200 unit injection; 200 Units by IntraMUSCular route every three (3) months. Inject selected muscles head and neck bilaterally every 3 months  Indications: MIGRAINE PREVENTION 
 
 
 
---------------------------------------------------------------------------------------------------------------------- Below is my encounter: Chief Complaint Patient presents with  Migraine HPI Mrs. Adriana Rodriguez is a 28-year-old woman here to follow-up. I see her for chronic migraine headaches in the setting of concussion. She also has severe anxiety depression. New changes that about a month ago she had lumbar spine laminectomy with decompression and fusion. She still recovering from that but doing well. She continues to see psychiatry and primary care. She is struggling with a lot of anxiety depression. Her son is incarcerated and is potentially being released next month and she is worried about his mental health. She still feels cognitively slow. She has daily headaches. Trokendi was recently reduced out of concern for side effects. Sleep is still an issue. Headaches remain throbbing and pounding. Migraine medication history: Amitriptyline, fluoxetine, topiramate, magnesium. Review of Systems Constitutional: Positive for malaise/fatigue. Neurological: Positive for headaches. Psychiatric/Behavioral: Positive for depression and memory loss. The patient is nervous/anxious and has insomnia. All other systems reviewed and are negative. Past Medical History:  
Diagnosis Date  Anxiety disorder  Depression  Headache  Spinal stenosis  Thyroid disease   
 hypothyroid History reviewed. No pertinent family history. Social History Social History  Marital status:  Spouse name: N/A  
 Number of children: N/A  
 Years of education: N/A Occupational History  Not on file. Social History Main Topics  Smoking status: Never Smoker  Smokeless tobacco: Never Used  Alcohol use 1.2 oz/week 2 Glasses of wine per week Comment: socially  Drug use: No  
 Sexual activity: Not on file Other Topics Concern  Not on file Social History Narrative Allergies Allergen Reactions  Percocet [Oxycodone-Acetaminophen] Itching Current Outpatient Prescriptions Medication Sig  
 aspirin 81 mg chewable tablet Take 81 mg by mouth daily.  CHLORZOXAZONE PO Take  by mouth.  ZOLPIDEM TARTRATE (AMBIEN PO) Take  by mouth.  methocarbamol (ROBAXIN) 750 mg tablet Take  by mouth four (4) times daily.  acetaminophen (TYLENOL) 325 mg tablet Take  by mouth every four (4) hours as needed for Pain.  onabotulinumtoxinA (BOTOX) 200 unit injection 200 Units by IntraMUSCular route every three (3) months. Inject selected muscles head and neck bilaterally every 3 months  Indications: MIGRAINE PREVENTION  cyclobenzaprine (FLEXERIL) 5 mg tablet Take 5 mg by mouth.  magnesium oxide 500 mg tab Take 250 mg by mouth.  ALPRAZolam (XANAX) 1 mg tablet TAKE 1/2-1 TABLET ORALLY DAILY AS NEEDED  FLUoxetine (PROZAC) 10 mg tablet Take 20 mg by mouth daily.   
 dextroamphetamine-amphetamine (ADDERALL) 30 mg tablet Take 30 mg by mouth daily. Dextroamphetamine-amphetamine 30 mg tab administration instructions: Take one tab orally every morning and 1/2 tab (15 mg) every afternoon)  levothyroxine (SYNTHROID) 25 mcg tablet Take 75 mcg by mouth Daily (before breakfast).  busPIRone (BUSPAR) 5 mg tablet TAKE 1/2 TO 1 TABLET BY MOUTH AS NEEDED FOR ANXIETY UP TO TWICE DAILY No current facility-administered medications for this visit. Neurologic Exam  
 
Mental Status WD/WN adult in NAD, normal grooming VSS 
A&O x 3, mood is a little depressed, she is anxious talking about her son. PERRL, nonicteric Face is symmetric, tongue midline Speech is fluent and clear No limb ataxia. No abnl movements. Moving all extemities spontaneously and symmetric Normal gait CVS RRR Lungs nonlabored Skin is warm and dry Visit Vitals  Resp 18  Wt 56.7 kg (125 lb)  LMP 04/22/2013  SpO2 98%  BMI 25.25 kg/m2 Assessment and Plan Diagnoses and all orders for this visit: 
 
1. Chronic migraine w/o aura w/o status migrainosus, not intractable 2. Cognitive disorder Other orders 
-     onabotulinumtoxinA (BOTOX) 200 unit injection; 200 Units by IntraMUSCular route every three (3) months. Inject selected muscles head and neck bilaterally every 3 months  Indications: MIGRAINE PREVENTION 31-year-old woman with chronic migraine headaches multifactorial due to chronic psychiatric disease as well as her history of head injuries. She does not tolerate topiramate due to side effects and the dose has been reduced to 50 mg once daily which is appropriate. She is not a candidate for beta-blockers due to her persistent depression. She has been on multiple medications for migraine control to include antidepressants, anticonvulsants, pain medications. I do think we should consider Botox injections at this point.   I am going to order medication and once is approved and here in the office we will call her in for injections. She would like to proceed with this. Thank you for giving me the opportunity to assist in the care of Ms. Shelley Frausto. If you have questions, please do not hesitate to contact me. Sincerely, 2 [facility], DO Neurologist 
Diplomate EARL

## 2018-04-16 NOTE — PROGRESS NOTES
Chief Complaint   Patient presents with    Migraine       HPI    Mrs. Carmen Fraire is a 61-year-old woman here to follow-up. I see her for chronic migraine headaches in the setting of concussion. She also has severe anxiety depression. New changes that about a month ago she had lumbar spine laminectomy with decompression and fusion. She still recovering from that but doing well. She continues to see psychiatry and primary care. She is struggling with a lot of anxiety depression. Her son is incarcerated and is potentially being released next month and she is worried about his mental health. She still feels cognitively slow. She has daily headaches. Trokendi was recently reduced out of concern for side effects. Sleep is still an issue. Headaches remain throbbing and pounding. Migraine medication history: Amitriptyline, fluoxetine, topiramate, magnesium. Review of Systems   Constitutional: Positive for malaise/fatigue. Neurological: Positive for headaches. Psychiatric/Behavioral: Positive for depression and memory loss. The patient is nervous/anxious and has insomnia. All other systems reviewed and are negative. Past Medical History:   Diagnosis Date    Anxiety disorder     Depression     Headache     Spinal stenosis     Thyroid disease     hypothyroid     History reviewed. No pertinent family history. Social History     Social History    Marital status:      Spouse name: N/A    Number of children: N/A    Years of education: N/A     Occupational History    Not on file.      Social History Main Topics    Smoking status: Never Smoker    Smokeless tobacco: Never Used    Alcohol use 1.2 oz/week     2 Glasses of wine per week      Comment: socially    Drug use: No    Sexual activity: Not on file     Other Topics Concern    Not on file     Social History Narrative     Allergies   Allergen Reactions    Percocet [Oxycodone-Acetaminophen] Itching         Current Outpatient Prescriptions   Medication Sig    aspirin 81 mg chewable tablet Take 81 mg by mouth daily.  CHLORZOXAZONE PO Take  by mouth.  ZOLPIDEM TARTRATE (AMBIEN PO) Take  by mouth.  methocarbamol (ROBAXIN) 750 mg tablet Take  by mouth four (4) times daily.  acetaminophen (TYLENOL) 325 mg tablet Take  by mouth every four (4) hours as needed for Pain.  onabotulinumtoxinA (BOTOX) 200 unit injection 200 Units by IntraMUSCular route every three (3) months. Inject selected muscles head and neck bilaterally every 3 months  Indications: MIGRAINE PREVENTION    cyclobenzaprine (FLEXERIL) 5 mg tablet Take 5 mg by mouth.  magnesium oxide 500 mg tab Take 250 mg by mouth.  ALPRAZolam (XANAX) 1 mg tablet TAKE 1/2-1 TABLET ORALLY DAILY AS NEEDED    FLUoxetine (PROZAC) 10 mg tablet Take 20 mg by mouth daily.  dextroamphetamine-amphetamine (ADDERALL) 30 mg tablet Take 30 mg by mouth daily. Dextroamphetamine-amphetamine 30 mg tab administration instructions: Take one tab orally every morning and 1/2 tab (15 mg) every afternoon)    levothyroxine (SYNTHROID) 25 mcg tablet Take 75 mcg by mouth Daily (before breakfast).  busPIRone (BUSPAR) 5 mg tablet TAKE 1/2 TO 1 TABLET BY MOUTH AS NEEDED FOR ANXIETY UP TO TWICE DAILY     No current facility-administered medications for this visit. Neurologic Exam     Mental Status        WD/WN adult in NAD, normal grooming  VSS  A&O x 3, mood is a little depressed, she is anxious talking about her son. PERRL, nonicteric  Face is symmetric, tongue midline  Speech is fluent and clear  No limb ataxia. No abnl movements. Moving all extemities spontaneously and symmetric  Normal gait    CVS RRR  Lungs nonlabored  Skin is warm and dry         Visit Vitals    Resp 18    Wt 56.7 kg (125 lb)    LMP 04/22/2013    SpO2 98%    BMI 25.25 kg/m2       Assessment and Plan   Diagnoses and all orders for this visit:    1.  Chronic migraine w/o aura w/o status migrainosus, not intractable    2. Cognitive disorder    Other orders  -     onabotulinumtoxinA (BOTOX) 200 unit injection; 200 Units by IntraMUSCular route every three (3) months. Inject selected muscles head and neck bilaterally every 3 months  Indications: MIGRAINE PREVENTION      61-year-old woman with chronic migraine headaches multifactorial due to chronic psychiatric disease as well as her history of head injuries. She does not tolerate topiramate due to side effects and the dose has been reduced to 50 mg once daily which is appropriate. She is not a candidate for beta-blockers due to her persistent depression. She has been on multiple medications for migraine control to include antidepressants, anticonvulsants, pain medications. I do think we should consider Botox injections at this point. I am going to order medication and once is approved and here in the office we will call her in for injections. She would like to proceed with this. I reviewed and decided to continue the current medications.       Shady Chau, [address]  Diplomate ABPN

## 2018-04-16 NOTE — COMMUNICATION BODY
Chief Complaint   Patient presents with    Migraine       HPI    Mrs. Carmen Fraire is a 63-year-old woman here to follow-up. I see her for chronic migraine headaches in the setting of concussion. She also has severe anxiety depression. New changes that about a month ago she had lumbar spine laminectomy with decompression and fusion. She still recovering from that but doing well. She continues to see psychiatry and primary care. She is struggling with a lot of anxiety depression. Her son is incarcerated and is potentially being released next month and she is worried about his mental health. She still feels cognitively slow. She has daily headaches. Trokendi was recently reduced out of concern for side effects. Sleep is still an issue. Headaches remain throbbing and pounding. Migraine medication history: Amitriptyline, fluoxetine, topiramate, magnesium. Review of Systems   Constitutional: Positive for malaise/fatigue. Neurological: Positive for headaches. Psychiatric/Behavioral: Positive for depression and memory loss. The patient is nervous/anxious and has insomnia. All other systems reviewed and are negative. Past Medical History:   Diagnosis Date    Anxiety disorder     Depression     Headache     Spinal stenosis     Thyroid disease     hypothyroid     History reviewed. No pertinent family history. Social History     Social History    Marital status:      Spouse name: N/A    Number of children: N/A    Years of education: N/A     Occupational History    Not on file.      Social History Main Topics    Smoking status: Never Smoker    Smokeless tobacco: Never Used    Alcohol use 1.2 oz/week     2 Glasses of wine per week      Comment: socially    Drug use: No    Sexual activity: Not on file     Other Topics Concern    Not on file     Social History Narrative     Allergies   Allergen Reactions    Percocet [Oxycodone-Acetaminophen] Itching         Current Outpatient Prescriptions   Medication Sig    aspirin 81 mg chewable tablet Take 81 mg by mouth daily.  CHLORZOXAZONE PO Take  by mouth.  ZOLPIDEM TARTRATE (AMBIEN PO) Take  by mouth.  methocarbamol (ROBAXIN) 750 mg tablet Take  by mouth four (4) times daily.  acetaminophen (TYLENOL) 325 mg tablet Take  by mouth every four (4) hours as needed for Pain.  onabotulinumtoxinA (BOTOX) 200 unit injection 200 Units by IntraMUSCular route every three (3) months. Inject selected muscles head and neck bilaterally every 3 months  Indications: MIGRAINE PREVENTION    cyclobenzaprine (FLEXERIL) 5 mg tablet Take 5 mg by mouth.  magnesium oxide 500 mg tab Take 250 mg by mouth.  ALPRAZolam (XANAX) 1 mg tablet TAKE 1/2-1 TABLET ORALLY DAILY AS NEEDED    FLUoxetine (PROZAC) 10 mg tablet Take 20 mg by mouth daily.  dextroamphetamine-amphetamine (ADDERALL) 30 mg tablet Take 30 mg by mouth daily. Dextroamphetamine-amphetamine 30 mg tab administration instructions: Take one tab orally every morning and 1/2 tab (15 mg) every afternoon)    levothyroxine (SYNTHROID) 25 mcg tablet Take 75 mcg by mouth Daily (before breakfast).  busPIRone (BUSPAR) 5 mg tablet TAKE 1/2 TO 1 TABLET BY MOUTH AS NEEDED FOR ANXIETY UP TO TWICE DAILY     No current facility-administered medications for this visit. Neurologic Exam     Mental Status        WD/WN adult in NAD, normal grooming  VSS  A&O x 3, mood is a little depressed, she is anxious talking about her son. PERRL, nonicteric  Face is symmetric, tongue midline  Speech is fluent and clear  No limb ataxia. No abnl movements. Moving all extemities spontaneously and symmetric  Normal gait    CVS RRR  Lungs nonlabored  Skin is warm and dry         Visit Vitals    Resp 18    Wt 56.7 kg (125 lb)    LMP 04/22/2013    SpO2 98%    BMI 25.25 kg/m2       Assessment and Plan   Diagnoses and all orders for this visit:    1.  Chronic migraine w/o aura w/o status migrainosus, not intractable    2. Cognitive disorder    Other orders  -     onabotulinumtoxinA (BOTOX) 200 unit injection; 200 Units by IntraMUSCular route every three (3) months. Inject selected muscles head and neck bilaterally every 3 months  Indications: MIGRAINE PREVENTION      53-year-old woman with chronic migraine headaches multifactorial due to chronic psychiatric disease as well as her history of head injuries. She does not tolerate topiramate due to side effects and the dose has been reduced to 50 mg once daily which is appropriate. She is not a candidate for beta-blockers due to her persistent depression. She has been on multiple medications for migraine control to include antidepressants, anticonvulsants, pain medications. I do think we should consider Botox injections at this point. I am going to order medication and once is approved and here in the office we will call her in for injections. She would like to proceed with this. I reviewed and decided to continue the current medications.       Jane Link, [address]  Diplomate ABPN

## 2018-05-14 ENCOUNTER — TELEPHONE (OUTPATIENT)
Dept: NEUROLOGY | Age: 58
End: 2018-05-14

## 2018-05-14 NOTE — TELEPHONE ENCOUNTER
Re: Botox    PA submitted via CMM to [facility]. Office note 4/16/18 attached. Pending status. \"[facility] is reviewing your PA request. Typically an electronic response will be received within 72 hours. To check for an update later, open this request from your dashboard. \"      Will update nurse when determination is made.

## 2018-05-23 ENCOUNTER — TELEPHONE (OUTPATIENT)
Dept: NEUROLOGY | Age: 58
End: 2018-05-23

## 2018-05-23 NOTE — TELEPHONE ENCOUNTER
Re: Botox    PA originally denied for medical necessity. S/w Dayana @ [facility] to get more information about denial. Per Dayana headache characteristics and tried/failed medications not included. I informed Tez Mccormack that all that information was included in the clinical notes. New review done over the phone w/ [employer]. Botox [id number] approved. Auth # [id number]. Auth good 5/21/18 - 8/21/18. S/w Lorenza Dale @ [facility]. No PA required for Botox CPT code 99110. SPP is Darius. Phone # [phone]. Forward to nurse. Please schedule shipment of medicine.

## 2018-06-07 ENCOUNTER — TELEPHONE (OUTPATIENT)
Dept: NEUROLOGY | Age: 58
End: 2018-06-07

## 2018-06-07 NOTE — TELEPHONE ENCOUNTER
----- Message from Nimisha Manzano sent at 6/7/2018  9:37 AM EDT -----  Regarding: Dr. Amanda Bray) would like a call to schedule pt's Botox delivery. Best contact number [phone].

## 2018-06-07 NOTE — TELEPHONE ENCOUNTER
----- Message from Deepak Woodall sent at 6/7/2018  1:15 PM EDT -----  Regarding: Bernard Bolivar/Telephone   Sohail needs to schedule Botox delivery. Best contact number [phone].  Option 4

## 2018-06-14 ENCOUNTER — OFFICE VISIT (OUTPATIENT)
Dept: NEUROLOGY | Age: 58
End: 2018-06-14

## 2018-06-14 VITALS
WEIGHT: 122 LBS | BODY MASS INDEX: 24.6 KG/M2 | HEART RATE: 102 BPM | DIASTOLIC BLOOD PRESSURE: 78 MMHG | SYSTOLIC BLOOD PRESSURE: 124 MMHG | HEIGHT: 59 IN | RESPIRATION RATE: 18 BRPM | OXYGEN SATURATION: 99 %

## 2018-06-14 DIAGNOSIS — G43.719 INTRACTABLE CHRONIC MIGRAINE WITHOUT AURA AND WITHOUT STATUS MIGRAINOSUS: Primary | ICD-10-CM

## 2018-06-14 NOTE — MR AVS SNAPSHOT
[facility] [address] 
[phone] Patient: Padmini Rogers MRN: [id number] :1960 Visit Information Date & Time Provider Department Dept. Phone Encounter #  
 2018  9:00 AM [address] [place] [address] Neurology Clinic at [address] [id number] Your Appointments 2018 10:20 AM  
Follow Up with [address] [place] [address] [facility] Appt Note: 3 month f/u headache  
 [address] [facility] [address]  
[phone]  
  
   
 [address]  [address] Upcoming Health Maintenance Date Due Hepatitis C Screening 1960 DTaP/Tdap/Td series (1 - Tdap) 1983 PAP AKA CERVICAL CYTOLOGY 1983 FOBT Q 1 YEAR AGE 50-75 2012 Influenza Age 5 to Adult 2018 BREAST CANCER SCRN MAMMOGRAM 3/16/2019 Allergies as of 2018  Review Complete On: 2018 By: Danny Alonso Severity Noted Reaction Type Reactions Percocet [Oxycodone-acetaminophen]  2013    Itching Current Immunizations  Never Reviewed Name Date Influenza Vaccine (Quad) PF 3/13/2017  9:39 AM  
  
 Not reviewed this visit You Were Diagnosed With   
  
 Codes Comments Intractable chronic migraine without aura and without status migrainosus    -  Primary ICD-10-CM: S96.522 ICD-9-CM: 346.71 Vitals BP Pulse Resp Height(growth percentile) Weight(growth percentile) LMP  
 124/78 (!) 102 18 4' 11\" (1.499 m) 122 lb (55.3 kg) 2013 SpO2 BMI OB Status Smoking Status 99% 24.64 kg/m2 Postmenopausal Never Smoker Vitals History BMI and BSA Data Body Mass Index Body Surface Area  
 24.64 kg/m 2 1.52 m 2 Preferred Pharmacy Pharmacy Name Phone [id number]- [place], [address] [phone] Your Updated Medication List  
  
   
This list is accurate as of 6/14/18  9:32 AM.  Always use your most recent med list.  
  
  
  
  
 ALPRAZolam 1 mg tablet Commonly known as:  XANAX  
TAKE 1/2-1 TABLET ORALLY DAILY AS NEEDED AMBIEN PO Take  by mouth. aspirin 81 mg chewable tablet Take 81 mg by mouth daily. busPIRone 5 mg tablet Commonly known as:  BUSPAR  
TAKE 1/2 TO 1 TABLET BY MOUTH AS NEEDED FOR ANXIETY UP TO TWICE DAILY CHLORZOXAZONE PO Take  by mouth. cyclobenzaprine 5 mg tablet Commonly known as:  FLEXERIL Take 5 mg by mouth. dextroamphetamine-amphetamine 30 mg tablet Commonly known as:  ADDERALL Take 30 mg by mouth daily. Dextroamphetamine-amphetamine 30 mg tab administration instructions: Take one tab orally every morning and 1/2 tab (15 mg) every afternoon) FLUoxetine 10 mg tablet Commonly known as:  PROzac Take 20 mg by mouth daily. levothyroxine 25 mcg tablet Commonly known as:  SYNTHROID Take 75 mcg by mouth Daily (before breakfast). magnesium oxide 500 mg Tab Take 250 mg by mouth. methocarbamol 750 mg tablet Commonly known as:  ROBAXIN Take  by mouth four (4) times daily. onabotulinumtoxinA 200 unit injection Commonly known as:  BOTOX  
200 Units by IntraMUSCular route every three (3) months. Inject selected muscles head and neck bilaterally every 3 months  Indications: MIGRAINE PREVENTION  
  
 TYLENOL 325 mg tablet Generic drug:  acetaminophen Take  by mouth every four (4) hours as needed for Pain. We Performed the Following [address] [id number] CPT(R)] Introducing [facility]! Dear Jose Guerin: 
Thank you for requesting a [employer] account.   Our records indicate that you already have an active [employer] account. You can access your account anytime at [email or web address] Did you know that you can access your hospital and ER discharge instructions at any time in [employer]? You can also review all of your test results from your hospital stay or ER visit. Additional Information If you have questions, please visit the Frequently Asked Questions section of the [employer] website at [email or web address] Remember, [employer] is NOT to be used for urgent needs. For medical emergencies, dial 911. Now available from your iPhone and Android! Please provide this summary of care documentation to your next provider. Your primary care clinician is listed as Jeanie Shah. If you have any questions after today's visit, please call [phone].

## 2018-06-14 NOTE — PROGRESS NOTES
Pt here for her first botox treatment. She is having headaches daily which are lasting more than 4 hours at a time.

## 2018-06-14 NOTE — PROGRESS NOTES
Botox Injection Note     2018     Patient:  Denzel Weathers   YOB: 1960  Date of Visit: 2018      Indication: patient has chronic migraine headaches greater than 15 days per month lasting more than 4 hours each. She has failed or not tolerated 2 or more medication preventatives. Written consent was signed and verified by me. Risks and benefits were discussed to include possible cosmetic asymmetry which is not permament or life threatening. Patient name and  was confirmed prior to procedure. Time out performed. Procedure:   Botox concentration: 200 units in 2 ml of preservative-free normal saline. 1:1 dilution. LOT#: [id number]  EXP:  2021    Injections and distribution as follow :      Units/site  Sites Loc Subtotal    procerus 5 1 ML 5   corrugaters 2.5 2 BL 5   frontalis 5 8 BL 40   Temporalis 10 4 BL 40   Occipitalis 10 2 BL 20   Cervical paraspinals 5 4 BL 20   Trapezius 10 4 BL 40         200 units Botox were reconstituted, 170 units injected as above and the remainder was unavoidably wasted. Patient tolerated procedure well. Return in 3 months for repeat injections.     _____________________________   Sherryle Acres, DO  [address], ABPN

## 2018-07-11 ENCOUNTER — TELEPHONE (OUTPATIENT)
Dept: NEUROLOGY | Age: 58
End: 2018-07-11

## 2018-07-11 ENCOUNTER — OFFICE VISIT (OUTPATIENT)
Dept: NEUROLOGY | Age: 58
End: 2018-07-11

## 2018-07-11 VITALS
HEART RATE: 71 BPM | WEIGHT: 122.4 LBS | DIASTOLIC BLOOD PRESSURE: 80 MMHG | HEIGHT: 59 IN | RESPIRATION RATE: 16 BRPM | BODY MASS INDEX: 24.68 KG/M2 | SYSTOLIC BLOOD PRESSURE: 120 MMHG | OXYGEN SATURATION: 98 %

## 2018-07-11 DIAGNOSIS — G43.719 INTRACTABLE CHRONIC MIGRAINE WITHOUT AURA AND WITHOUT STATUS MIGRAINOSUS: Primary | ICD-10-CM

## 2018-07-11 RX ORDER — BUTALBITAL, ACETAMINOPHEN AND CAFFEINE 300; 40; 50 MG/1; MG/1; MG/1
1 CAPSULE ORAL
COMMUNITY
End: 2018-10-22

## 2018-07-11 RX ORDER — TOPIRAMATE 100 MG/1
100 TABLET, FILM COATED ORAL 2 TIMES DAILY
COMMUNITY
End: 2019-08-13

## 2018-07-11 NOTE — MR AVS SNAPSHOT
[facility] [address] [place] [address] 
[phone] Patient: Melvin Tran MRN: [id number] :1960 Visit Information Date & Time Provider Department Dept. Phone Encounter #  
 2018 10:20 AM Kaiden Tabares DO [facility] at [address] [id number] Follow-up Instructions Return in about 3 months (around 10/22/2018), or and botox in sep. Your Appointments 2018  9:30 AM  
PROCEDURE with Kaiden Dariusthang LEILANI NavasSedgwickDO [facility] at [facility]) Appt Note: botox inj / [address] [facility] [address]  
[phone]  
  
   
 [address] [facility] [address] [facility] [address] Upcoming Health Maintenance Date Due Hepatitis C Screening 1960 DTaP/Tdap/Td series (1 - Tdap) 1983 PAP AKA CERVICAL CYTOLOGY 1983 FOBT Q 1 YEAR AGE 50-75 2012 Influenza Age 5 to Adult 2018 BREAST CANCER SCRN MAMMOGRAM 3/16/2019 Allergies as of 2018  Review Complete On: 2018 By: Cesar Dueñas Severity Noted Reaction Type Reactions Percocet [Oxycodone-acetaminophen]  2013    Itching Current Immunizations  Never Reviewed Name Date Influenza Vaccine (Quad) PF 3/13/2017  9:39 AM  
  
 Not reviewed this visit You Were Diagnosed With   
  
 Codes Comments Intractable chronic migraine without aura and without status migrainosus    -  Primary ICD-10-CM: D24.148 ICD-9-CM: 346.71 Vitals BP Pulse Resp Height(growth percentile) Weight(growth percentile) LMP  
 120/80 71 16 4' 11\" (1.499 m) 122 lb 6.4 oz (55.5 kg) 2013 SpO2 BMI OB Status Smoking Status 98% 24.72 kg/m2 Postmenopausal Never Smoker Vitals History BMI and BSA Data Body Mass Index Body Surface Area  24.72 kg/m 2 1.52 m 2  
  
 Preferred Pharmacy Pharmacy Name Phone [facility][id number]- [place], [address] [place] [phone] Your Updated Medication List  
  
   
This list is accurate as of 7/11/18 10:48 AM.  Always use your most recent med list.  
  
  
  
  
 ALPRAZolam 1 mg tablet Commonly known as:  XANAX  
TAKE 1/2-1 TABLET ORALLY DAILY AS NEEDED AMBIEN PO Take  by mouth. aspirin 81 mg chewable tablet Take 81 mg by mouth daily. busPIRone 5 mg tablet Commonly known as:  BUSPAR  
TAKE 1/2 TO 1 TABLET BY MOUTH AS NEEDED FOR ANXIETY UP TO TWICE DAILY  
  
 butalbital-acetaminophen-caff -40 mg per capsule Commonly known as:  [employer] Take  by mouth every four (4) hours as needed for Pain. CHLORZOXAZONE PO Take  by mouth. cyclobenzaprine 5 mg tablet Commonly known as:  FLEXERIL Take 5 mg by mouth. dextroamphetamine-amphetamine 30 mg tablet Commonly known as:  ADDERALL Take 30 mg by mouth daily. Dextroamphetamine-amphetamine 30 mg tab administration instructions: Take one tab orally every morning and 1/2 tab (15 mg) every afternoon) erenumab-aooe 70 mg/mL Atin Commonly known as:  AIMOVIG AUTOINJECTOR (2 PACK) 1 Syringe by SubCUTAneous route every month. FLUoxetine 10 mg tablet Commonly known as:  PROzac Take 20 mg by mouth daily. levothyroxine 25 mcg tablet Commonly known as:  SYNTHROID Take 75 mcg by mouth Daily (before breakfast). magnesium oxide 500 mg Tab Take 250 mg by mouth. methocarbamol 750 mg tablet Commonly known as:  ROBAXIN Take  by mouth four (4) times daily. onabotulinumtoxinA 200 unit injection Commonly known as:  BOTOX  
200 Units by IntraMUSCular route every three (3) months. Inject selected muscles head and neck bilaterally every 3 months  Indications: MIGRAINE PREVENTION REXULTI 1 mg Tab tablet Generic drug:  brexpiprazole Take  by mouth daily. TOPAMAX 100 mg tablet Generic drug:  topiramate Take  by mouth two (2) times a day. TYLENOL 325 mg tablet Generic drug:  acetaminophen Take  by mouth every four (4) hours as needed for Pain. Follow-up Instructions Return in about 3 months (around 10/22/2018), or and botox in sep. Patient Instructions A Healthy Lifestyle: Care Instructions Your Care Instructions A healthy lifestyle can help you feel good, stay at a healthy weight, and have plenty of energy for both work and play. A healthy lifestyle is something you can share with your whole family. A healthy lifestyle also can lower your risk for serious health problems, such as high blood pressure, heart disease, and diabetes. You can follow a few steps listed below to improve your health and the health of your family. Follow-up care is a key part of your treatment and safety. Be sure to make and go to all appointments, and call your doctor if you are having problems. It's also a good idea to know your test results and keep a list of the medicines you take. How can you care for yourself at home? · Do not eat too much sugar, fat, or fast foods. You can still have dessert and treats now and then. The goal is moderation. · Start small to improve your eating habits. Pay attention to portion sizes, drink less juice and soda pop, and eat more fruits and vegetables. ¨ Eat a healthy amount of food. A 3-ounce serving of meat, for example, is about the size of a deck of cards. Fill the rest of your plate with vegetables and whole grains. ¨ Limit the amount of soda and sports drinks you have every day. Drink more water when you are thirsty. ¨ Eat at least 5 servings of fruits and vegetables every day. It may seem like a lot, but it is not hard to reach this goal. A serving or helping is 1 piece of fruit, 1 cup of vegetables, or 2 cups of leafy, raw vegetables. Have an apple or some carrot sticks as an afternoon snack instead of a candy bar. Try to have fruits and/or vegetables at every meal. 
· Make exercise part of your daily routine. You may want to start with simple activities, such as walking, bicycling, or slow swimming. Try to be active 30 to 60 minutes every day. You do not need to do all 30 to 60 minutes all at once. For example, you can exercise 3 times a day for 10 or 20 minutes. Moderate exercise is safe for most people, but it is always a good idea to talk to your doctor before starting an exercise program. 
· Keep moving. [employer] the lawn, work in the garden, or [employer]. Take the stairs instead of the elevator at work. · If you smoke, quit. People who smoke have an increased risk for heart attack, stroke, cancer, and other lung illnesses. Quitting is hard, but there are ways to boost your chance of quitting tobacco for good. ¨ Use nicotine gum, patches, or lozenges. ¨ Ask your doctor about stop-smoking programs and medicines. ¨ Keep trying. In addition to reducing your risk of diseases in the future, you will notice some benefits soon after you stop using tobacco. If you have shortness of breath or asthma symptoms, they will likely get better within a few weeks after you quit. · Limit how much alcohol you drink. Moderate amounts of alcohol (up to 2 drinks a day for men, 1 drink a day for women) are okay. But drinking too much can lead to liver problems, high blood pressure, and other health problems. Family health If you have a family, there are many things you can do together to improve your health. · Eat meals together as a family as often as possible. · Eat healthy foods. This includes fruits, vegetables, lean meats and dairy, and whole grains. · Include your family in your fitness plan.  Most people think of activities such as jogging or tennis as the way to fitness, but there are many ways you and your family can be more active. Anything that makes you breathe hard and gets your heart pumping is exercise. Here are some tips: 
¨ Walk to do errands or to take your child to school or the bus. ¨ Go for a family bike ride after dinner instead of watching TV. Where can you learn more? Go to http://andrew-rocky.info/. Enter Q848 in the search box to learn more about \"A Healthy Lifestyle: Care Instructions. \" Current as of: December 7, 2017 Content Version: 11.7 © 6791-5832 [employer]. Care instructions adapted under license by [employer] (which disclaims liability or warranty for this information). If you have questions about a medical condition or this instruction, always ask your healthcare professional. [address] any warranty or liability for your use of this information. Introducing [facility]! Dear Tamera Galarza: 
Thank you for requesting a [employer] account. Our records indicate that you already have an active [employer] account. You can access your account anytime at [email or web address] Did you know that you can access your hospital and ER discharge instructions at any time in [employer]? You can also review all of your test results from your hospital stay or ER visit. Additional Information If you have questions, please visit the Frequently Asked Questions section of the [employer] website at [email or web address] Remember, [employer] is NOT to be used for urgent needs. For medical emergencies, dial 911. Now available from your iPhone and Android! Please provide this summary of care documentation to your next provider. Your primary care clinician is listed as [employer]. If you have any questions after today's visit, please call [phone].

## 2018-07-11 NOTE — PATIENT INSTRUCTIONS

## 2018-07-11 NOTE — PROGRESS NOTES
Chief Complaint   Patient presents with    Headache       HPI    72-year-old woman here to follow-up on chronic migraines and history of concussion. She had Botox injections June 14 with good results initially. It was first-time injections for her. She was headache free for about 2 weeks. She has since been having breakthrough migraines rather frequently a few times a week but not as severe. She is on multiple psychotropics. Anxiety still difficult at times to control. Overall she is clinically stable. Review of Systems   Neurological: Positive for headaches. Psychiatric/Behavioral: The patient is nervous/anxious. All other systems reviewed and are negative. Past Medical History:   Diagnosis Date    Anxiety disorder     Depression     Headache     Spinal stenosis     Thyroid disease     hypothyroid     No family history on file. Social History     Social History    Marital status:      Spouse name: N/A    Number of children: N/A    Years of education: N/A     Occupational History    Not on file. Social History Main Topics    Smoking status: Never Smoker    Smokeless tobacco: Never Used    Alcohol use 1.2 oz/week     2 Glasses of wine per week      Comment: socially    Drug use: No    Sexual activity: Not on file     Other Topics Concern    Not on file     Social History Narrative     Allergies   Allergen Reactions    Percocet [Oxycodone-Acetaminophen] Itching         Current Outpatient Prescriptions   Medication Sig    topiramate (TOPAMAX) 100 mg tablet Take  by mouth two (2) times a day.  brexpiprazole (REXULTI) 1 mg tab tablet Take  by mouth daily.  butalbital-acetaminophen-caff (FIORICET) -40 mg per capsule Take  by mouth every four (4) hours as needed for Pain.  erenumab-aooe (AIMOVIG AUTOINJECTOR, 2 PACK,) 70 mg/mL atIn 1 Syringe by SubCUTAneous route every month.     acetaminophen (TYLENOL) 325 mg tablet Take  by mouth every four (4) hours as needed for Pain.  onabotulinumtoxinA (BOTOX) 200 unit injection 200 Units by IntraMUSCular route every three (3) months. Inject selected muscles head and neck bilaterally every 3 months  Indications: MIGRAINE PREVENTION    cyclobenzaprine (FLEXERIL) 5 mg tablet Take 5 mg by mouth.  magnesium oxide 500 mg tab Take 250 mg by mouth.  ALPRAZolam (XANAX) 1 mg tablet TAKE 1/2-1 TABLET ORALLY DAILY AS NEEDED    FLUoxetine (PROZAC) 10 mg tablet Take 20 mg by mouth daily.  dextroamphetamine-amphetamine (ADDERALL) 30 mg tablet Take 30 mg by mouth daily. Dextroamphetamine-amphetamine 30 mg tab administration instructions: Take one tab orally every morning and 1/2 tab (15 mg) every afternoon)    levothyroxine (SYNTHROID) 25 mcg tablet Take 75 mcg by mouth Daily (before breakfast).  aspirin 81 mg chewable tablet Take 81 mg by mouth daily.  CHLORZOXAZONE PO Take  by mouth.  ZOLPIDEM TARTRATE (AMBIEN PO) Take  by mouth.  methocarbamol (ROBAXIN) 750 mg tablet Take  by mouth four (4) times daily.  busPIRone (BUSPAR) 5 mg tablet TAKE 1/2 TO 1 TABLET BY MOUTH AS NEEDED FOR ANXIETY UP TO TWICE DAILY     No current facility-administered medications for this visit. Neurologic Exam     Mental Status        WD/WN adult in NAD, normal grooming  VSS  A&O x 3    PERRL, nonicteric  Face is symmetric, tongue midline  Speech is fluent and clear  No limb ataxia. No abnl movements. Moving all extemities spontaneously and symmetric  Normal gait    CVS RRR  Lungs nonlabored  Skin is warm and dry         Visit Vitals    /80    Pulse 71    Resp 16    Ht 4' 11\" (1.499 m)    Wt 55.5 kg (122 lb 6.4 oz)    LMP 04/22/2013    SpO2 98%    BMI 24.72 kg/m2       Assessment and Plan   Diagnoses and all orders for this visit:    1.  Intractable chronic migraine without aura and without status migrainosus    Other orders  -     erenumab-aooe (AIMOVIG AUTOINJECTOR, 2 PACK,) 70 mg/mL atIn; 1 Syringe by SubCUTAneous route every month. 53-year-old woman with chronic migraines who is having some improvement with Botox injections after first time injection. Despite that she still having frequent headaches that bear better control. I do think she is someone who could benefit from further control so I am going to order Aimovig. I discussed the medication with her as far as its mechanism of action and how to use the medication. It is a biologic medication which she is aware of. So far there is no data suggesting serious adverse events. It may take several weeks until she receives the medication. We will continue Botox injections next due in September. I reviewed and decided to continue the current medications.       2 [facility] [address]  Diplomate NATHALIEN

## 2018-07-11 NOTE — COMMUNICATION BODY
Chief Complaint   Patient presents with    Headache       HPI    51-year-old woman here to follow-up on chronic migraines and history of concussion. She had Botox injections June 14 with good results initially. It was first-time injections for her. She was headache free for about 2 weeks. She has since been having breakthrough migraines rather frequently a few times a week but not as severe. She is on multiple psychotropics. Anxiety still difficult at times to control. Overall she is clinically stable. Review of Systems   Neurological: Positive for headaches. Psychiatric/Behavioral: The patient is nervous/anxious. All other systems reviewed and are negative. Past Medical History:   Diagnosis Date    Anxiety disorder     Depression     Headache     Spinal stenosis     Thyroid disease     hypothyroid     No family history on file. Social History     Social History    Marital status:      Spouse name: N/A    Number of children: N/A    Years of education: N/A     Occupational History    Not on file. Social History Main Topics    Smoking status: Never Smoker    Smokeless tobacco: Never Used    Alcohol use 1.2 oz/week     2 Glasses of wine per week      Comment: socially    Drug use: No    Sexual activity: Not on file     Other Topics Concern    Not on file     Social History Narrative     Allergies   Allergen Reactions    Percocet [Oxycodone-Acetaminophen] Itching         Current Outpatient Prescriptions   Medication Sig    topiramate (TOPAMAX) 100 mg tablet Take  by mouth two (2) times a day.  brexpiprazole (REXULTI) 1 mg tab tablet Take  by mouth daily.  butalbital-acetaminophen-caff (FIORICET) -40 mg per capsule Take  by mouth every four (4) hours as needed for Pain.  erenumab-aooe (AIMOVIG AUTOINJECTOR, 2 PACK,) 70 mg/mL atIn 1 Syringe by SubCUTAneous route every month.     acetaminophen (TYLENOL) 325 mg tablet Take  by mouth every four (4) hours as needed for Pain.  onabotulinumtoxinA (BOTOX) 200 unit injection 200 Units by IntraMUSCular route every three (3) months. Inject selected muscles head and neck bilaterally every 3 months  Indications: MIGRAINE PREVENTION    cyclobenzaprine (FLEXERIL) 5 mg tablet Take 5 mg by mouth.  magnesium oxide 500 mg tab Take 250 mg by mouth.  ALPRAZolam (XANAX) 1 mg tablet TAKE 1/2-1 TABLET ORALLY DAILY AS NEEDED    FLUoxetine (PROZAC) 10 mg tablet Take 20 mg by mouth daily.  dextroamphetamine-amphetamine (ADDERALL) 30 mg tablet Take 30 mg by mouth daily. Dextroamphetamine-amphetamine 30 mg tab administration instructions: Take one tab orally every morning and 1/2 tab (15 mg) every afternoon)    levothyroxine (SYNTHROID) 25 mcg tablet Take 75 mcg by mouth Daily (before breakfast).  aspirin 81 mg chewable tablet Take 81 mg by mouth daily.  CHLORZOXAZONE PO Take  by mouth.  ZOLPIDEM TARTRATE (AMBIEN PO) Take  by mouth.  methocarbamol (ROBAXIN) 750 mg tablet Take  by mouth four (4) times daily.  busPIRone (BUSPAR) 5 mg tablet TAKE 1/2 TO 1 TABLET BY MOUTH AS NEEDED FOR ANXIETY UP TO TWICE DAILY     No current facility-administered medications for this visit. Neurologic Exam     Mental Status        WD/WN adult in NAD, normal grooming  VSS  A&O x 3    PERRL, nonicteric  Face is symmetric, tongue midline  Speech is fluent and clear  No limb ataxia. No abnl movements. Moving all extemities spontaneously and symmetric  Normal gait    CVS RRR  Lungs nonlabored  Skin is warm and dry         Visit Vitals    /80    Pulse 71    Resp 16    Ht 4' 11\" (1.499 m)    Wt 55.5 kg (122 lb 6.4 oz)    LMP 04/22/2013    SpO2 98%    BMI 24.72 kg/m2       Assessment and Plan   Diagnoses and all orders for this visit:    1.  Intractable chronic migraine without aura and without status migrainosus    Other orders  -     erenumab-aooe (AIMOVIG AUTOINJECTOR, 2 PACK,) 70 mg/mL atIn; 1 Syringe by SubCUTAneous route every month. 79-year-old woman with chronic migraines who is having some improvement with Botox injections after first time injection. Despite that she still having frequent headaches that bear better control. I do think she is someone who could benefit from further control so I am going to order Aimovig. I discussed the medication with her as far as its mechanism of action and how to use the medication. It is a biologic medication which she is aware of. So far there is no data suggesting serious adverse events. It may take several weeks until she receives the medication. We will continue Botox injections next due in September. I reviewed and decided to continue the current medications.       Terrence Reeves, [address]  Diplomate ABPN

## 2018-07-11 NOTE — LETTER
7/11/2018 Patient:  Annette Dupree YOB: 1960 Date of Visit: 7/11/2018 Dear Sia Perry MD 
[address] [id number] VIA Facsimile: [phone] 
 : 
 
 
I was requested by Sia Perry MD to evaluate Ms. Annette Dupree  for Chief Complaint Patient presents with  
 Headache Oris Vera I am recommending the following:  
 
Diagnoses and all orders for this visit: 
 
1. Intractable chronic migraine without aura and without status migrainosus Other orders 
-     erenumab-aooe (AIMOVIG AUTOINJECTOR, 2 PACK,) 70 mg/mL atIn; 1 Syringe by SubCUTAneous route every month. 
 
 
 
---------------------------------------------------------------------------------------------------------------------- Below is my encounter: Chief Complaint Patient presents with  
 Headache HPI 
 
61-year-old woman here to follow-up on chronic migraines and history of concussion. She had Botox injections June 14 with good results initially. It was first-time injections for her. She was headache free for about 2 weeks. She has since been having breakthrough migraines rather frequently a few times a week but not as severe. She is on multiple psychotropics. Anxiety still difficult at times to control. Overall she is clinically stable. Review of Systems Neurological: Positive for headaches. Psychiatric/Behavioral: The patient is nervous/anxious. All other systems reviewed and are negative. Past Medical History:  
Diagnosis Date  Anxiety disorder  Depression  Headache  Spinal stenosis  Thyroid disease   
 hypothyroid No family history on file. Social History Social History  Marital status:  Spouse name: N/A  
 Number of children: N/A  
 Years of education: N/A Occupational History  Not on file. Social History Main Topics  Smoking status: Never Smoker  Smokeless tobacco: Never Used  Alcohol use 1.2 oz/week 2 Glasses of wine per week Comment: socially  Drug use: No  
 Sexual activity: Not on file Other Topics Concern  Not on file Social History Narrative Allergies Allergen Reactions  Percocet [Oxycodone-Acetaminophen] Itching Current Outpatient Prescriptions Medication Sig  topiramate (TOPAMAX) 100 mg tablet Take  by mouth two (2) times a day.  brexpiprazole (REXULTI) 1 mg tab tablet Take  by mouth daily.  butalbital-acetaminophen-caff (FIORICET) -40 mg per capsule Take  by mouth every four (4) hours as needed for Pain.  erenumab-aooe (AIMOVIG AUTOINJECTOR, 2 PACK,) 70 mg/mL atIn 1 Syringe by SubCUTAneous route every month.  acetaminophen (TYLENOL) 325 mg tablet Take  by mouth every four (4) hours as needed for Pain.  onabotulinumtoxinA (BOTOX) 200 unit injection 200 Units by IntraMUSCular route every three (3) months. Inject selected muscles head and neck bilaterally every 3 months  Indications: MIGRAINE PREVENTION  cyclobenzaprine (FLEXERIL) 5 mg tablet Take 5 mg by mouth.  magnesium oxide 500 mg tab Take 250 mg by mouth.  ALPRAZolam (XANAX) 1 mg tablet TAKE 1/2-1 TABLET ORALLY DAILY AS NEEDED  FLUoxetine (PROZAC) 10 mg tablet Take 20 mg by mouth daily.  dextroamphetamine-amphetamine (ADDERALL) 30 mg tablet Take 30 mg by mouth daily. Dextroamphetamine-amphetamine 30 mg tab administration instructions: Take one tab orally every morning and 1/2 tab (15 mg) every afternoon)  levothyroxine (SYNTHROID) 25 mcg tablet Take 75 mcg by mouth Daily (before breakfast).  aspirin 81 mg chewable tablet Take 81 mg by mouth daily.  CHLORZOXAZONE PO Take  by mouth.  ZOLPIDEM TARTRATE (AMBIEN PO) Take  by mouth.  methocarbamol (ROBAXIN) 750 mg tablet Take  by mouth four (4) times daily.  busPIRone (BUSPAR) 5 mg tablet TAKE 1/2 TO 1 TABLET BY MOUTH AS NEEDED FOR ANXIETY UP TO TWICE DAILY No current facility-administered medications for this visit. Neurologic Exam  
 
Mental Status WD/WN adult in NAD, normal grooming VSS 
A&O x 3 PERRL, nonicteric Face is symmetric, tongue midline Speech is fluent and clear No limb ataxia. No abnl movements. Moving all extemities spontaneously and symmetric Normal gait CVS RRR Lungs nonlabored Skin is warm and dry Visit Vitals  /80  Pulse 71  Resp 16  
 Ht 4' 11\" (1.499 m)  Wt 55.5 kg (122 lb 6.4 oz)  LMP 04/22/2013  SpO2 98%  BMI 24.72 kg/m2 Assessment and Plan Diagnoses and all orders for this visit: 
 
1. Intractable chronic migraine without aura and without status migrainosus Other orders 
-     erenumab-aooe (AIMOVIG AUTOINJECTOR, 2 PACK,) 70 mg/mL atIn; 1 Syringe by SubCUTAneous route every month. 56-year-old woman with chronic migraines who is having some improvement with Botox injections after first time injection. Despite that she still having frequent headaches that bear better control. I do think she is someone who could benefit from further control so I am going to order Aimovig. I discussed the medication with her as far as its mechanism of action and how to use the medication. It is a biologic medication which she is aware of. So far there is no data suggesting serious adverse events. It may take several weeks until she receives the medication. We will continue Botox injections next due in September. Thank you for giving me the opportunity to assist in the care of Ms. Ivanna Leon. If you have questions, please do not hesitate to contact me. Sincerely, César Cuba DO Neurologist 
Diplomate NATHALIEN

## 2018-08-01 ENCOUNTER — HOSPITAL ENCOUNTER (OUTPATIENT)
Age: 58
Setting detail: OBSERVATION
Discharge: HOME | End: 2018-08-04
Attending: EMERGENCY MEDICINE | Admitting: INTERNAL MEDICINE
Payer: COMMERCIAL

## 2018-08-01 ENCOUNTER — APPOINTMENT (OUTPATIENT)
Dept: CT IMAGING | Age: 58
End: 2018-08-01
Attending: EMERGENCY MEDICINE
Payer: COMMERCIAL

## 2018-08-01 ENCOUNTER — APPOINTMENT (OUTPATIENT)
Dept: GENERAL RADIOLOGY | Age: 58
End: 2018-08-01
Attending: EMERGENCY MEDICINE
Payer: COMMERCIAL

## 2018-08-01 ENCOUNTER — TELEPHONE (OUTPATIENT)
Dept: NEUROLOGY | Age: 58
End: 2018-08-01

## 2018-08-01 DIAGNOSIS — G24.02 DYSTONIC DRUG REACTION: Primary | ICD-10-CM

## 2018-08-01 DIAGNOSIS — R29.898 WEAKNESS OF RIGHT UPPER EXTREMITY: ICD-10-CM

## 2018-08-01 DIAGNOSIS — G45.9 TRANSIENT CEREBRAL ISCHEMIA, UNSPECIFIED TYPE: ICD-10-CM

## 2018-08-01 DIAGNOSIS — I65.23 BILATERAL CAROTID ARTERY STENOSIS: ICD-10-CM

## 2018-08-01 LAB
ALBUMIN SERPL-MCNC: 3.9 G/DL (ref 3.5–5)
ALBUMIN/GLOB SERPL: 1 {RATIO} (ref 1.1–2.2)
ALP SERPL-CCNC: 88 U/L (ref 45–117)
ALT SERPL-CCNC: 26 U/L (ref 12–78)
ANION GAP SERPL CALC-SCNC: 3 MMOL/L (ref 5–15)
AST SERPL-CCNC: 21 U/L (ref 15–37)
BASOPHILS # BLD: 0 K/UL (ref 0–0.1)
BASOPHILS NFR BLD: 1 % (ref 0–1)
BILIRUB SERPL-MCNC: 0.3 MG/DL (ref 0.2–1)
BUN SERPL-MCNC: 12 MG/DL (ref 6–20)
BUN/CREAT SERPL: 12 (ref 12–20)
CALCIUM SERPL-MCNC: 8.9 MG/DL (ref 8.5–10.1)
CHLORIDE SERPL-SCNC: 112 MMOL/L (ref 97–108)
CO2 SERPL-SCNC: 26 MMOL/L (ref 21–32)
CREAT SERPL-MCNC: 0.98 MG/DL (ref 0.55–1.02)
DIFFERENTIAL METHOD BLD: NORMAL
EOSINOPHIL # BLD: 0 K/UL (ref 0–0.4)
EOSINOPHIL NFR BLD: 1 % (ref 0–7)
ERYTHROCYTE [DISTWIDTH] IN BLOOD BY AUTOMATED COUNT: 14.4 % (ref 11.5–14.5)
GLOBULIN SER CALC-MCNC: 3.8 G/DL (ref 2–4)
GLUCOSE SERPL-MCNC: 90 MG/DL (ref 65–100)
HCT VFR BLD AUTO: 41.3 % (ref 35–47)
HGB BLD-MCNC: 12.7 G/DL (ref 11.5–16)
IMM GRANULOCYTES # BLD: 0 K/UL (ref 0–0.04)
IMM GRANULOCYTES NFR BLD AUTO: 0 % (ref 0–0.5)
LYMPHOCYTES # BLD: 2.2 K/UL (ref 0.8–3.5)
LYMPHOCYTES NFR BLD: 34 % (ref 12–49)
MAGNESIUM SERPL-MCNC: 2.1 MG/DL (ref 1.6–2.4)
MCH RBC QN AUTO: 26.2 PG (ref 26–34)
MCHC RBC AUTO-ENTMCNC: 30.8 G/DL (ref 30–36.5)
MCV RBC AUTO: 85.2 FL (ref 80–99)
MONOCYTES # BLD: 0.5 K/UL (ref 0–1)
MONOCYTES NFR BLD: 8 % (ref 5–13)
NEUTS SEG # BLD: 3.7 K/UL (ref 1.8–8)
NEUTS SEG NFR BLD: 57 % (ref 32–75)
NRBC # BLD: 0 K/UL (ref 0–0.01)
NRBC BLD-RTO: 0 PER 100 WBC
PHOSPHATE SERPL-MCNC: 3.1 MG/DL (ref 2.6–4.7)
PLATELET # BLD AUTO: 318 K/UL (ref 150–400)
PMV BLD AUTO: 9.9 FL (ref 8.9–12.9)
POTASSIUM SERPL-SCNC: 4.4 MMOL/L (ref 3.5–5.1)
PROT SERPL-MCNC: 7.7 G/DL (ref 6.4–8.2)
RBC # BLD AUTO: 4.85 M/UL (ref 3.8–5.2)
SODIUM SERPL-SCNC: 141 MMOL/L (ref 136–145)
TROPONIN I SERPL-MCNC: <0.05 NG/ML
WBC # BLD AUTO: 6.5 K/UL (ref 3.6–11)

## 2018-08-01 PROCEDURE — 80053 COMPREHEN METABOLIC PANEL: CPT

## 2018-08-01 PROCEDURE — 87086 URINE CULTURE/COLONY COUNT: CPT | Performed by: INTERNAL MEDICINE

## 2018-08-01 PROCEDURE — 99218 HC RM OBSERVATION: CPT

## 2018-08-01 PROCEDURE — 70450 CT HEAD/BRAIN W/O DYE: CPT

## 2018-08-01 PROCEDURE — 93005 ELECTROCARDIOGRAM TRACING: CPT

## 2018-08-01 PROCEDURE — 87077 CULTURE AEROBIC IDENTIFY: CPT | Performed by: INTERNAL MEDICINE

## 2018-08-01 PROCEDURE — 74011250636 HC RX REV CODE- 250/636: Performed by: INTERNAL MEDICINE

## 2018-08-01 PROCEDURE — 84100 ASSAY OF PHOSPHORUS: CPT

## 2018-08-01 PROCEDURE — 36415 COLL VENOUS BLD VENIPUNCTURE: CPT

## 2018-08-01 PROCEDURE — 83735 ASSAY OF MAGNESIUM: CPT

## 2018-08-01 PROCEDURE — 84484 ASSAY OF TROPONIN QUANT: CPT | Performed by: EMERGENCY MEDICINE

## 2018-08-01 PROCEDURE — 74011250637 HC RX REV CODE- 250/637: Performed by: INTERNAL MEDICINE

## 2018-08-01 PROCEDURE — 71045 X-RAY EXAM CHEST 1 VIEW: CPT

## 2018-08-01 PROCEDURE — 65660000000 HC RM CCU STEPDOWN

## 2018-08-01 PROCEDURE — 85025 COMPLETE CBC W/AUTO DIFF WBC: CPT

## 2018-08-01 PROCEDURE — 87186 SC STD MICRODIL/AGAR DIL: CPT | Performed by: INTERNAL MEDICINE

## 2018-08-01 PROCEDURE — 81001 URINALYSIS AUTO W/SCOPE: CPT | Performed by: INTERNAL MEDICINE

## 2018-08-01 PROCEDURE — 96372 THER/PROPH/DIAG INJ SC/IM: CPT

## 2018-08-01 PROCEDURE — 99283 EMERGENCY DEPT VISIT LOW MDM: CPT

## 2018-08-01 RX ORDER — TEMAZEPAM 15 MG/1
15 CAPSULE ORAL
COMMUNITY
End: 2018-10-22

## 2018-08-01 RX ORDER — LEVOTHYROXINE SODIUM 75 UG/1
75 TABLET ORAL
Status: DISCONTINUED | OUTPATIENT
Start: 2018-08-02 | End: 2018-08-04 | Stop reason: HOSPADM

## 2018-08-01 RX ORDER — CLOPIDOGREL BISULFATE 75 MG/1
75 TABLET ORAL DAILY
Status: DISCONTINUED | OUTPATIENT
Start: 2018-08-01 | End: 2018-08-04 | Stop reason: HOSPADM

## 2018-08-01 RX ORDER — ACETAMINOPHEN 325 MG/1
325 TABLET ORAL
Status: DISCONTINUED | OUTPATIENT
Start: 2018-08-01 | End: 2018-08-04 | Stop reason: HOSPADM

## 2018-08-01 RX ORDER — HEPARIN SODIUM 5000 [USP'U]/ML
5000 INJECTION, SOLUTION INTRAVENOUS; SUBCUTANEOUS EVERY 8 HOURS
Status: DISCONTINUED | OUTPATIENT
Start: 2018-08-01 | End: 2018-08-03

## 2018-08-01 RX ORDER — SODIUM CHLORIDE 0.9 % (FLUSH) 0.9 %
5-10 SYRINGE (ML) INJECTION EVERY 8 HOURS
Status: DISCONTINUED | OUTPATIENT
Start: 2018-08-01 | End: 2018-08-04 | Stop reason: HOSPADM

## 2018-08-01 RX ORDER — CYCLOBENZAPRINE HCL 10 MG
5 TABLET ORAL
Status: DISCONTINUED | OUTPATIENT
Start: 2018-08-01 | End: 2018-08-02

## 2018-08-01 RX ORDER — DEXTROAMPHETAMINE SACCHARATE, AMPHETAMINE ASPARTATE, DEXTROAMPHETAMINE SULFATE AND AMPHETAMINE SULFATE 7.5; 7.5; 7.5; 7.5 MG/1; MG/1; MG/1; MG/1
20 TABLET ORAL 2 TIMES DAILY
Status: DISCONTINUED | OUTPATIENT
Start: 2018-08-02 | End: 2018-08-02

## 2018-08-01 RX ORDER — ATORVASTATIN CALCIUM 40 MG/1
40 TABLET, FILM COATED ORAL
Status: DISCONTINUED | OUTPATIENT
Start: 2018-08-01 | End: 2018-08-04 | Stop reason: HOSPADM

## 2018-08-01 RX ORDER — TOPIRAMATE 100 MG/1
100 TABLET, FILM COATED ORAL 2 TIMES DAILY
Status: DISCONTINUED | OUTPATIENT
Start: 2018-08-02 | End: 2018-08-04 | Stop reason: HOSPADM

## 2018-08-01 RX ORDER — ALPRAZOLAM 0.5 MG/1
2 TABLET ORAL DAILY
Status: DISCONTINUED | OUTPATIENT
Start: 2018-08-02 | End: 2018-08-01

## 2018-08-01 RX ORDER — BUSPIRONE HYDROCHLORIDE 5 MG/1
5 TABLET ORAL DAILY PRN
Status: DISCONTINUED | OUTPATIENT
Start: 2018-08-01 | End: 2018-08-04 | Stop reason: HOSPADM

## 2018-08-01 RX ORDER — METHOCARBAMOL 750 MG/1
750 TABLET, FILM COATED ORAL 4 TIMES DAILY
Status: DISCONTINUED | OUTPATIENT
Start: 2018-08-01 | End: 2018-08-02

## 2018-08-01 RX ORDER — SODIUM CHLORIDE 0.9 % (FLUSH) 0.9 %
5-10 SYRINGE (ML) INJECTION AS NEEDED
Status: DISCONTINUED | OUTPATIENT
Start: 2018-08-01 | End: 2018-08-04 | Stop reason: HOSPADM

## 2018-08-01 RX ORDER — FLUOXETINE HYDROCHLORIDE 20 MG/1
20 CAPSULE ORAL DAILY
Status: DISCONTINUED | OUTPATIENT
Start: 2018-08-02 | End: 2018-08-02

## 2018-08-01 RX ORDER — TEMAZEPAM 15 MG/1
15 CAPSULE ORAL
Status: DISCONTINUED | OUTPATIENT
Start: 2018-08-01 | End: 2018-08-04 | Stop reason: HOSPADM

## 2018-08-01 RX ADMIN — METHOCARBAMOL 750 MG: 750 TABLET ORAL at 23:38

## 2018-08-01 RX ADMIN — CYCLOBENZAPRINE HYDROCHLORIDE 5 MG: 10 TABLET, FILM COATED ORAL at 23:38

## 2018-08-01 RX ADMIN — CLOPIDOGREL BISULFATE 75 MG: 75 TABLET ORAL at 23:38

## 2018-08-01 RX ADMIN — HEPARIN SODIUM 5000 UNITS: 5000 INJECTION INTRAVENOUS; SUBCUTANEOUS at 23:38

## 2018-08-01 RX ADMIN — ACETAMINOPHEN 325 MG: 325 TABLET ORAL at 23:38

## 2018-08-01 NOTE — Clinical Note
Patient Class[de-identified] Observation [514] Type of Bed: Telemetry [19] Reason for Observation: TIA Admitting Diagnosis: TIA (transient ischemic attack) [070337] Admitting Physician: Tobin Osgood Attending Physician: Marion Leblanc, [address]  [[id number]]

## 2018-08-01 NOTE — TELEPHONE ENCOUNTER
Pt went to hospital at [facility] for weakness in her right side. Pt went to PT today and she is still having weakness on her right side.  Please call back

## 2018-08-01 NOTE — IP AVS SNAPSHOT
[address] [place] [address] 
[phone] Patient: Marianne Dias MRN: [id number] :1960 A check vania indicates which time of day the medication should be taken. My Medications CONTINUE taking these medications Instructions Each Dose to Equal  
 Morning Noon Evening Bedtime * ALPRAZolam 1 mg tablet Commonly known as:  Jagdeep January Your last dose was: Your next dose is: Take 1 mg by mouth daily as needed for Anxiety. Pt takes 3 mg ER daily and 1 mg PRN  
 1 mg * XANAX XR 3 mg XR tablet Generic drug:  ALPRAZolam  
   
Your last dose was: Your next dose is: Take 3 mg by mouth daily. Pt takes 3 mg ER daily and 1 mg PRN  
 3 mg  
    
   
   
   
  
 aspirin 81 mg chewable tablet Your last dose was: Your next dose is: Take 81 mg by mouth daily. 81 mg  
    
   
   
   
  
 busPIRone 5 mg tablet Commonly known as:  BUSPAR Your last dose was: Your next dose is: TAKE 1 TABLET BY MOUTH QHS AS NEEDED FOR ANXIETY  
     
   
   
   
  
 butalbital-acetaminophen-caff -40 mg per capsule Commonly known as:  [employer] Your last dose was: Your next dose is: Take 1 Cap by mouth two (2) times daily as needed for Pain or Headache (twice daily). 1 Cap  
    
   
   
   
  
 cyclobenzaprine 5 mg tablet Commonly known as:  FLEXERIL Your last dose was: Your next dose is: Take 5 mg by mouth nightly as needed for Muscle Spasm(s). 5 mg  
    
   
   
   
  
 dextroamphetamine-amphetamine 20 mg tablet Commonly known as:  ADDERALL Your last dose was: Your next dose is: Take 20 mg by mouth two (2) times a day. 20 mg  
    
   
   
   
  
 erenumab-aooe 70 mg/mL Atin Commonly known as:  AIMOVIG AUTOINJECTOR (2 PACK) Your last dose was: Your next dose is:    
   
   
 1 Syringe by SubCUTAneous route every month. 1 Syringe FLUoxetine 20 mg tablet Commonly known as:  PROzac Your last dose was: Your next dose is: Take 40 mg by mouth daily. 40 mg  
    
   
   
   
  
 levothyroxine 75 mcg tablet Commonly known as:  SYNTHROID Your last dose was: Your next dose is: Take 75 mcg by mouth Daily (before breakfast). 75 mcg  
    
   
   
   
  
 magnesium 250 mg Tab Your last dose was: Your next dose is: Take 1 Tab by mouth nightly. 1 Tab  
    
   
   
   
  
 methocarbamol 750 mg tablet Commonly known as:  ROBAXIN Your last dose was: Your next dose is: Take 750 mg by mouth four (4) times daily as needed. Pt uses PRN for spasms and cramping 750 mg  
    
   
   
   
  
 neomycin-bacitracnZn-polymyxnB 3.5-400-5,000 mg-unit-unit Oipk ointment Commonly known as:  NEOSPORIN Your last dose was: Your next dose is:    
   
   
 Apply 1 Packet to affected area two (2) times daily as needed (cuts). 1 Packet  
    
   
   
   
  
 temazepam 15 mg capsule Commonly known as:  RESTORIL Your last dose was: Your next dose is: Take 15 mg by mouth nightly as needed for Sleep. 15 mg  
    
   
   
   
  
 TOPAMAX 100 mg tablet Generic drug:  topiramate Your last dose was: Your next dose is: Take 100 mg by mouth two (2) times a day. 100 mg  
    
   
   
   
  
 TYLENOL 325 mg tablet Generic drug:  acetaminophen Your last dose was: Your next dose is: Take 650 mg by mouth every four (4) hours as needed for Pain. 650 mg  
    
   
   
   
  
 * Notice: This list has 2 medication(s) that are the same as other medications prescribed for you.  Read the directions carefully, and ask your doctor or other care provider to review them with you.

## 2018-08-02 ENCOUNTER — APPOINTMENT (OUTPATIENT)
Dept: GENERAL RADIOLOGY | Age: 58
End: 2018-08-02
Attending: PSYCHIATRY & NEUROLOGY
Payer: COMMERCIAL

## 2018-08-02 PROBLEM — I65.23 BILATERAL CAROTID ARTERY STENOSIS: Status: ACTIVE | Noted: 2018-08-02

## 2018-08-02 LAB
ANION GAP SERPL CALC-SCNC: 7 MMOL/L (ref 5–15)
APPEARANCE UR: ABNORMAL
ATRIAL RATE: 50 BPM
BACTERIA URNS QL MICRO: ABNORMAL /HPF
BILIRUB UR QL: NEGATIVE
BUN SERPL-MCNC: 9 MG/DL (ref 6–20)
BUN/CREAT SERPL: 10 (ref 12–20)
CALCIUM SERPL-MCNC: 8.7 MG/DL (ref 8.5–10.1)
CALCULATED P AXIS, ECG09: 37 DEGREES
CALCULATED R AXIS, ECG10: 9 DEGREES
CALCULATED T AXIS, ECG11: 33 DEGREES
CHLORIDE SERPL-SCNC: 112 MMOL/L (ref 97–108)
CHOLEST SERPL-MCNC: 166 MG/DL
CO2 SERPL-SCNC: 23 MMOL/L (ref 21–32)
COLOR UR: ABNORMAL
CREAT SERPL-MCNC: 0.93 MG/DL (ref 0.55–1.02)
DIAGNOSIS, 93000: NORMAL
EPITH CASTS URNS QL MICRO: ABNORMAL /LPF
ERYTHROCYTE [DISTWIDTH] IN BLOOD BY AUTOMATED COUNT: 14.5 % (ref 11.5–14.5)
EST. AVERAGE GLUCOSE BLD GHB EST-MCNC: 137 MG/DL
GLUCOSE SERPL-MCNC: 87 MG/DL (ref 65–100)
GLUCOSE UR STRIP.AUTO-MCNC: NEGATIVE MG/DL
HBA1C MFR BLD: 6.4 % (ref 4.2–6.3)
HCT VFR BLD AUTO: 38.7 % (ref 35–47)
HDLC SERPL-MCNC: 74 MG/DL
HDLC SERPL: 2.2 {RATIO} (ref 0–5)
HGB BLD-MCNC: 12.3 G/DL (ref 11.5–16)
HGB UR QL STRIP: NEGATIVE
KETONES UR QL STRIP.AUTO: NEGATIVE MG/DL
LDLC SERPL CALC-MCNC: 72.6 MG/DL (ref 0–100)
LEUKOCYTE ESTERASE UR QL STRIP.AUTO: ABNORMAL
LIPID PROFILE,FLP: NORMAL
MCH RBC QN AUTO: 26.3 PG (ref 26–34)
MCHC RBC AUTO-ENTMCNC: 31.8 G/DL (ref 30–36.5)
MCV RBC AUTO: 82.9 FL (ref 80–99)
NITRITE UR QL STRIP.AUTO: NEGATIVE
NRBC # BLD: 0 K/UL (ref 0–0.01)
NRBC BLD-RTO: 0 PER 100 WBC
P-R INTERVAL, ECG05: 142 MS
PH UR STRIP: 7.5 [PH] (ref 5–8)
PLATELET # BLD AUTO: 281 K/UL (ref 150–400)
PMV BLD AUTO: 9.6 FL (ref 8.9–12.9)
POTASSIUM SERPL-SCNC: 3.5 MMOL/L (ref 3.5–5.1)
PROT UR STRIP-MCNC: NEGATIVE MG/DL
Q-T INTERVAL, ECG07: 480 MS
QRS DURATION, ECG06: 74 MS
QTC CALCULATION (BEZET), ECG08: 437 MS
RBC # BLD AUTO: 4.67 M/UL (ref 3.8–5.2)
RBC #/AREA URNS HPF: ABNORMAL /HPF (ref 0–5)
SODIUM SERPL-SCNC: 142 MMOL/L (ref 136–145)
SP GR UR REFRACTOMETRY: 1.01 (ref 1–1.03)
TRIGL SERPL-MCNC: 97 MG/DL (ref ?–150)
TROPONIN I SERPL-MCNC: <0.05 NG/ML
TSH SERPL DL<=0.05 MIU/L-ACNC: 1.84 UIU/ML (ref 0.36–3.74)
UA: UC IF INDICATED,UAUC: ABNORMAL
UROBILINOGEN UR QL STRIP.AUTO: 0.2 EU/DL (ref 0.2–1)
VENTRICULAR RATE, ECG03: 50 BPM
VLDLC SERPL CALC-MCNC: 19.4 MG/DL
WBC # BLD AUTO: 5.4 K/UL (ref 3.6–11)
WBC URNS QL MICRO: ABNORMAL /HPF (ref 0–4)

## 2018-08-02 PROCEDURE — 96372 THER/PROPH/DIAG INJ SC/IM: CPT

## 2018-08-02 PROCEDURE — 74011250636 HC RX REV CODE- 250/636: Performed by: INTERNAL MEDICINE

## 2018-08-02 PROCEDURE — 93880 EXTRACRANIAL BILAT STUDY: CPT

## 2018-08-02 PROCEDURE — 99218 HC RM OBSERVATION: CPT

## 2018-08-02 PROCEDURE — G8979 MOBILITY GOAL STATUS: HCPCS

## 2018-08-02 PROCEDURE — 74011250637 HC RX REV CODE- 250/637: Performed by: EMERGENCY MEDICINE

## 2018-08-02 PROCEDURE — 74011250637 HC RX REV CODE- 250/637: Performed by: INTERNAL MEDICINE

## 2018-08-02 PROCEDURE — 84443 ASSAY THYROID STIM HORMONE: CPT | Performed by: INTERNAL MEDICINE

## 2018-08-02 PROCEDURE — 93306 TTE W/DOPPLER COMPLETE: CPT

## 2018-08-02 PROCEDURE — 84484 ASSAY OF TROPONIN QUANT: CPT | Performed by: INTERNAL MEDICINE

## 2018-08-02 PROCEDURE — 73030 X-RAY EXAM OF SHOULDER: CPT

## 2018-08-02 PROCEDURE — 85027 COMPLETE CBC AUTOMATED: CPT | Performed by: INTERNAL MEDICINE

## 2018-08-02 PROCEDURE — G8978 MOBILITY CURRENT STATUS: HCPCS

## 2018-08-02 PROCEDURE — 80061 LIPID PANEL: CPT | Performed by: INTERNAL MEDICINE

## 2018-08-02 PROCEDURE — 80048 BASIC METABOLIC PNL TOTAL CA: CPT | Performed by: INTERNAL MEDICINE

## 2018-08-02 PROCEDURE — 97530 THERAPEUTIC ACTIVITIES: CPT

## 2018-08-02 PROCEDURE — 97161 PT EVAL LOW COMPLEX 20 MIN: CPT

## 2018-08-02 PROCEDURE — 92610 EVALUATE SWALLOWING FUNCTION: CPT

## 2018-08-02 PROCEDURE — 36415 COLL VENOUS BLD VENIPUNCTURE: CPT | Performed by: INTERNAL MEDICINE

## 2018-08-02 PROCEDURE — 83036 HEMOGLOBIN GLYCOSYLATED A1C: CPT | Performed by: INTERNAL MEDICINE

## 2018-08-02 PROCEDURE — G8980 MOBILITY D/C STATUS: HCPCS

## 2018-08-02 RX ORDER — FLUOXETINE HYDROCHLORIDE 20 MG/1
20 CAPSULE ORAL ONCE
Status: COMPLETED | OUTPATIENT
Start: 2018-08-02 | End: 2018-08-02

## 2018-08-02 RX ORDER — BUTALBITAL, ACETAMINOPHEN AND CAFFEINE 50; 325; 40 MG/1; MG/1; MG/1
2 TABLET ORAL ONCE
Status: COMPLETED | OUTPATIENT
Start: 2018-08-02 | End: 2018-08-02

## 2018-08-02 RX ORDER — ALPRAZOLAM 1 MG/1
1 TABLET ORAL
COMMUNITY
End: 2022-10-10

## 2018-08-02 RX ORDER — FLUOXETINE HYDROCHLORIDE 20 MG/1
40 CAPSULE ORAL DAILY
Status: DISCONTINUED | OUTPATIENT
Start: 2018-08-03 | End: 2018-08-04 | Stop reason: HOSPADM

## 2018-08-02 RX ORDER — DEXTROAMPHETAMINE SACCHARATE, AMPHETAMINE ASPARTATE, DEXTROAMPHETAMINE SULFATE AND AMPHETAMINE SULFATE 5; 5; 5; 5 MG/1; MG/1; MG/1; MG/1
20 TABLET ORAL 2 TIMES DAILY
COMMUNITY
End: 2022-10-10

## 2018-08-02 RX ORDER — BUTALBITAL, ACETAMINOPHEN AND CAFFEINE 50; 325; 40 MG/1; MG/1; MG/1
1 TABLET ORAL
Status: DISCONTINUED | OUTPATIENT
Start: 2018-08-02 | End: 2018-08-04 | Stop reason: HOSPADM

## 2018-08-02 RX ORDER — ALPRAZOLAM 3 MG/1
3 TABLET, EXTENDED RELEASE ORAL DAILY
COMMUNITY
End: 2019-09-30

## 2018-08-02 RX ORDER — ZINC GLUCONATE 10 MG
1 LOZENGE ORAL
COMMUNITY

## 2018-08-02 RX ORDER — FLUOXETINE 20 MG/1
40 TABLET ORAL DAILY
COMMUNITY
End: 2018-10-22

## 2018-08-02 RX ORDER — CYCLOBENZAPRINE HCL 10 MG
5 TABLET ORAL
Status: DISCONTINUED | OUTPATIENT
Start: 2018-08-02 | End: 2018-08-04 | Stop reason: HOSPADM

## 2018-08-02 RX ORDER — LEVOTHYROXINE SODIUM 75 UG/1
75 TABLET ORAL
COMMUNITY
End: 2019-10-29 | Stop reason: SDUPTHER

## 2018-08-02 RX ORDER — METHOCARBAMOL 750 MG/1
750 TABLET, FILM COATED ORAL
Status: DISCONTINUED | OUTPATIENT
Start: 2018-08-02 | End: 2018-08-04 | Stop reason: HOSPADM

## 2018-08-02 RX ADMIN — METHOCARBAMOL 750 MG: 750 TABLET ORAL at 09:57

## 2018-08-02 RX ADMIN — FLUOXETINE 20 MG: 20 CAPSULE ORAL at 09:57

## 2018-08-02 RX ADMIN — LEVOTHYROXINE SODIUM 75 MCG: 75 TABLET ORAL at 07:42

## 2018-08-02 RX ADMIN — BUSPIRONE HYDROCHLORIDE 5 MG: 5 TABLET ORAL at 09:57

## 2018-08-02 RX ADMIN — HEPARIN SODIUM 5000 UNITS: 5000 INJECTION INTRAVENOUS; SUBCUTANEOUS at 06:56

## 2018-08-02 RX ADMIN — Medication 10 ML: at 21:43

## 2018-08-02 RX ADMIN — TOPIRAMATE 100 MG: 100 TABLET, FILM COATED ORAL at 09:57

## 2018-08-02 RX ADMIN — ATORVASTATIN CALCIUM 40 MG: 40 TABLET, FILM COATED ORAL at 21:43

## 2018-08-02 RX ADMIN — BUTALBITAL, ACETAMINOPHEN AND CAFFEINE 2 TABLET: 50; 325; 40 TABLET ORAL at 18:36

## 2018-08-02 RX ADMIN — HEPARIN SODIUM 5000 UNITS: 5000 INJECTION INTRAVENOUS; SUBCUTANEOUS at 14:59

## 2018-08-02 RX ADMIN — TOPIRAMATE 100 MG: 100 TABLET, FILM COATED ORAL at 18:36

## 2018-08-02 RX ADMIN — ACETAMINOPHEN 325 MG: 325 TABLET ORAL at 14:51

## 2018-08-02 RX ADMIN — Medication 10 ML: at 14:46

## 2018-08-02 RX ADMIN — TEMAZEPAM 15 MG: 15 CAPSULE ORAL at 00:40

## 2018-08-02 RX ADMIN — ATORVASTATIN CALCIUM 40 MG: 40 TABLET, FILM COATED ORAL at 00:52

## 2018-08-02 RX ADMIN — FLUOXETINE 20 MG: 20 CAPSULE ORAL at 14:59

## 2018-08-02 NOTE — PROGRESS NOTES
physical Therapy EVALUATION/DISCHARGE  Patient: Dimitry Edwards (13 y.o. female)  Date: 8/2/2018  Primary Diagnosis: TIA (transient ischemic attack)  TIA (transient ischemic attack)        Precautions:      ASSESSMENT :  Based on the objective data described below, the patient presents with only c/o R hand and foot tingling but resolved sxs from yesterday. Cleared by RN for session. Pt states she lives with her  in two story home with 3 steps to enter and B rails on each. She states she has been fully independent w/o device. She has been attending OPPT since her back surgery and that the PT noticed some new R extremity weakness yesterday and recommended evaluation by MD.   At this time, pt shows full AROM and normal strength RLE with some linger weakness (4/5) at L hip and knee but this has been present since back issues and reason for OPPT. She shows good coordination. She is independent with bed mobility and transfers. She amb with S to I with fast speed, steady gait, no path deviation or LOB. She is able to toilet and manage all bathroom hygiene independently. She states she feels she is returning to baseline. She scores 55/56 on the RAHMAN for low fall risk. Pt declines amb on stairs but shows no issues with stepping or single leg stance on RAHMAN. No needs identified for skilled PT in the acute setting at this time. She should continue her OPPT as cleared by physician to address remaining issues from back surgery. Will complete order and sign off for now. If new issues arise, please re-consult. Pt should be ok for amb with S of nursing or family in halls. Skilled physical therapy is not indicated at this time.      PLAN :  Discharge Recommendations: Outpatient, continue  Further Equipment Recommendations for Discharge: none     SUBJECTIVE:   Patient stated I feel like I am getting back to normal.    OBJECTIVE DATA SUMMARY:   HISTORY:    Past Medical History:   Diagnosis Date    Anxiety disorder     Depression     Headache     Spinal stenosis     Thyroid disease     hypothyroid     Past Surgical History:   Procedure Laterality Date    HX HEENT      HX LUMBAR FUSION  03/16/2018    HX ORTHOPAEDIC  2002    ACL repair, pt unsure which side    HX ORTHOPAEDIC  2013    left shoulder surgery    DC COLONOSCOPY FLX DX W/COLLJ SPEC WHEN PFRMD  5/6/2013          Prior Level of Function/Home Situation: fully independent  Personal factors and/or comorbidities impacting plan of care:     Home Situation  Home Environment: Private residence  # Steps to Enter: 3  Rails to Enter: Yes  Hand Rails : Bilateral  One/Two Story Residence: Two story  # of Interior Steps: 12  Interior Rails: Both  Living Alone: No  Support Systems: Spouse/Significant Other/Partner  Patient Expects to be Discharged to[de-identified] Private residence  Current DME Used/Available at Home: None    EXAMINATION/PRESENTATION/DECISION MAKING:   Critical Behavior:  Neurologic State: Alert, Appropriate for age  Orientation Level: Oriented X4  Cognition: Follows commands, Appropriate for age attention/concentration  Safety/Judgement: Awareness of environment  Hearing: Auditory  Auditory Impairment: None    Range Of Motion:  AROM: Within functional limits           PROM: Within functional limits           Strength:    Strength:  Within functional limits (does show very slight decr at L knee/hip = d/t back issues)                    Tone & Sensation:   Tone: Normal (nl LEs)              Sensation:  (reports some tingling in R hand and foot)               Coordination:  Coordination: Within functional limits       Functional Mobility:  Bed Mobility:  Rolling: Independent  Supine to Sit: Independent  Sit to Supine: Independent     Transfers:  Sit to Stand: Independent  Stand to Sit: Independent                       Balance:   Sitting: Intact  Standing: Intact  Ambulation/Gait Training:  Distance (ft): 150 Feet (ft)     Ambulation - Level of Assistance: Supervision; Independent (S to I)     Gait Description (WDL): Within defined limits                        Functional Measure:  Rahman Balance Test:    Sitting to Standin  Standing Unsupported: 4  Sitting with Back Unsupported: 4  Standing to Sittin  Transfers: 4  Standing Unsupported with Eyes Closed: 4  Standing Unsupported with Feet Together: 4  Reach Forward with Outstretched Arm: 4   Object: 4  Turn to Look Over Shoulders: 4  Turn 360 Degrees: 4  Alternate Foot on Step/Stool: 4  Standing Unsupported One Foot in Front: 4  Stand on One Leg: 3  Total: 55         56=Maximum possible score;   0-20=High fall risk  21-40=Moderate fall risk   41-56=Low fall risk     Rahman Balance Test and G-code impairment scale:  Percentage of Impairment CH    0%   CI    1-19% CJ    20-39% CK    40-59% CL    60-79% CM    80-99% CN     100%   Rahman   Score 0-56 56 45-55 34-44 23-33 12-22 1-11 0         G codes: In compliance with CMSs Claims Based Outcome Reporting, the following G-code set was chosen for this patient based on their primary functional limitation being treated: The outcome measure chosen to determine the severity of the functional limitation was the RAHMAN with a score of 55/56 which was correlated with the impairment scale.     ? Mobility - Walking and Moving Around:     - CURRENT STATUS: CI - 1%-19% impaired, limited or restricted    - GOAL STATUS: CI - 1%-19% impaired, limited or restricted    - D/C STATUS:  CI - 1%-19% impaired, limited or restricted        Physical Therapy Evaluation Charge Determination   History Examination Presentation Decision-Making   MEDIUM  Complexity : 1-2 comorbidities / personal factors will impact the outcome/ POC  HIGH Complexity : 4+ Standardized tests and measures addressing body structure, function, activity limitation and / or participation in recreation  LOW Complexity : Stable, uncomplicated  LOW Complexity : FOTO score of       Based on the above components, the patient evaluation is determined to be of the following complexity level: LOW     Pain:  Pain Scale 1: Numeric (0 - 10)  Pain Intensity 1: 5  Pain Location 1: Head  Activity Tolerance:   Good for session  Please refer to the flowsheet for vital signs taken during this treatment. After treatment:   []   Patient left in no apparent distress sitting up in chair  [x]   Patient left in no apparent distress in bed  [x]   Call bell left within reach  [x]   Nursing notified  []   Caregiver present  []   Bed alarm activated    COMMUNICATION/EDUCATION:   Communication/Collaboration:  [x]   Fall prevention education was provided and the patient/caregiver indicated understanding. [x]   Patient/family have participated as able and agree with findings and recommendations. []   Patient is unable to participate in plan of care at this time.   Findings and recommendations were discussed with: Occupational Therapist and Registered Nurse    Thank you for this referral.  Linda Gibson, PT   Time Calculation: 23 mins

## 2018-08-02 NOTE — ED NOTES
TRANSFER - OUT REPORT:    Verbal report given to [facility] - RAUL DE LUNA (name) on Clint Abreu  being transferred to [facility](unit) for routine progression of care       Report consisted of patients Situation, Background, Assessment and   Recommendations(SBAR). Information from the following report(s) SBAR, Kardex, ED Summary, Intake/Output, MAR, Recent Results and Med Rec Status was reviewed with the receiving nurse. Lines:   Peripheral IV 08/01/18 Left Antecubital (Active)   Site Assessment Clean, dry, & intact 8/1/2018  7:51 PM   Phlebitis Assessment 0 8/1/2018  7:51 PM   Infiltration Assessment 0 8/1/2018  7:51 PM   Dressing Status Clean, dry, & intact 8/1/2018  7:51 PM   Dressing Type 4 X 4;Transparent 8/1/2018  7:51 PM   Hub Color/Line Status Blue;Flushed 8/1/2018  7:51 PM   Action Taken Catheter retaped 8/1/2018  7:51 PM        Opportunity for questions and clarification was provided.       Patient transported with:   [email or web address]

## 2018-08-02 NOTE — PROGRESS NOTES
Arrived from ER to room 3114. With . Alert and oriented x4. States all neuro deficits have resolved except numbness to rt side of face. Oriented to room.

## 2018-08-02 NOTE — ED NOTES
I have reviewed discharge instructions with the patient and her daughter. The patient and her daughter verbalized understanding. Patient left ED via Discharge Method: ambulatory to Home with her daughter, Elliot Tinoco. Opportunity for questions and clarification provided. Patient given 1 scripts. Pt to CT

## 2018-08-02 NOTE — PROGRESS NOTES
Problem: Dysphagia (Adult)  Goal: *Acute Goals and Plan of Care (Insert Text)  Speech pathology goals  Initiated 8/2/2018  1. Patient will tolerate regular/thin liquid diet with no overt s/s aspiration within 7 days  Speech LAnguage Pathology bedside swallow evaluation  Patient: Wilfredo Virk (17 y.o. female)  Date: 8/2/2018  Primary Diagnosis: TIA (transient ischemic attack)  TIA (transient ischemic attack)        Precautions: swallow       ASSESSMENT :  Based on the objective data described below, the patient presents with mild oropharyngeal dysphagia. Patient with suspected premature spillage and delayed swallow initiation. Patient with strong cough x2 with thin liquid via straw, however no overt s/s aspiration observed with thin liquid via cup sip. Patient reported dysphagia beginning 1 week ago characterized by effortful swallow and feeling \"like it wasn't going down the right way. \" Patient reported swallowing has improved. Patient reported motor speech, language, and cognitive function are at her baseline at this time. Patient reported memory deficits since her aneurysm. Patient will benefit from skilled intervention to address the above impairments. Patients rehabilitation potential is considered to be Good  Factors which may influence rehabilitation potential include:   []            None noted  []            Mental ability/status  [x]            Medical condition  []            Home/family situation and support systems  []            Safety awareness  []            Pain tolerance/management  []            Other:      PLAN :  Recommendations and Planned Interventions:  --Continue regular/thin liquid diet  --NO STRAWS, single cup sips only  --Meds as tolerated  --SLP to follow for diet tolerance  Frequency/Duration: Patient will be followed by speech-language pathology 2 times a week to address goals. Discharge Recommendations:  To Be Determined     SUBJECTIVE:   Patient stated It's gotten better re: swallowing. Patient alert, appropriate, cooperative. Oriented x4. OBJECTIVE:     Past Medical History:   Diagnosis Date    Anxiety disorder     Depression     Headache     Spinal stenosis     Thyroid disease     hypothyroid     Past Surgical History:   Procedure Laterality Date    HX HEENT      HX LUMBAR FUSION  03/16/2018    HX ORTHOPAEDIC  2002    ACL repair, pt unsure which side    HX ORTHOPAEDIC  2013    left shoulder surgery    NC COLONOSCOPY FLX DX W/COLLJ SPEC WHEN PFRMD  5/6/2013          Prior Level of Function/Home Situation:      Diet prior to admission: regular/thin  Current Diet:  Regular/thin   Cognitive and Communication Status:  Neurologic State: Alert, Appropriate for age  Orientation Level: Oriented X4  Cognition: Follows commands, Appropriate for age attention/concentration  Perception: Appears intact  Perseveration: No perseveration noted  Safety/Judgement: Awareness of environment  Oral Assessment:  Oral Assessment  Labial: No impairment  Dentition: Natural;Full  Oral Hygiene: moist oral mucosa  Lingual: No impairment  Velum: No impairment  Mandible: No impairment  P.O. Trials:  Patient Position: upright in bed  Vocal quality prior to P.O.: No impairment  Consistency Presented: Thin liquid; Solid; Other (comment) (breakfast tray)  How Presented: Self-fed/presented;Cup/sip;Cup/gulp;Straw;Successive swallows;Spoon     Bolus Acceptance: No impairment  Bolus Formation/Control: Impaired  Type of Impairment: Premature spillage  Propulsion: No impairment  Oral Residue: None  Initiation of Swallow: Delayed (# of seconds)  Laryngeal Elevation: Functional  Aspiration Signs/Symptoms: Strong cough (with thin liquid via straw, none via cup)  Pharyngeal Phase Characteristics: Double swallow  Effective Modifications: Cup/sip  Cues for Modifications: Minimal       Oral Phase Severity: Mild  Pharyngeal Phase Severity : Mild    NOMS:   The NOMS functional outcome measure was used to quantify this patient's level of swallowing impairment. Based on the NOMS, the patient was determined to be at level 6 for swallow function       NOMS Swallowing Levels:  Level 1 (CN): NPO  Level 2 (CM): NPO but takes consistency in therapy  Level 3 (CL): Takes less than 50% of nutrition p.o. and continues with nonoral feedings; and/or safe with mod cues; and/or max diet restriction  Level 4 (CK): Safe swallow but needs mod cues; and/or mod diet restriction; and/or still requires some nonoral feeding/supplements  Level 5 (CJ): Safe swallow with min diet restriction; and/or needs min cues  Level 6 (CI): Independent with p.o.; rare cues; usually self cues; may need to avoid some foods or needs extra time  Level 7 ([address] [place] [address]): Independent for all p.o.  MANISH. (2003). National Outcomes Measurement System (NOMS): Adult Speech-Language Pathology User's Guide. Pain:  Pain Scale 1: Numeric (0 - 10)  Pain Intensity 1: 5  Pain Location 1: Head  After treatment:   []            Patient left in no apparent distress sitting up in chair  [x]            Patient left in no apparent distress in bed  [x]            Call bell left within reach  [x]            Nursing notified  []            Caregiver present  []            Bed alarm activated    COMMUNICATION/EDUCATION:   The patients plan of care including recommendations, planned interventions, and recommended diet changes were discussed with: Registered Nurse. [x]            Patient/family have participated as able in goal setting and plan of care. [x]            Patient/family agree to work toward stated goals and plan of care. []            Patient understands intent and goals of therapy, but is neutral about his/her participation. []            Patient is unable to participate in goal setting and plan of care.     Thank you for this referral.  Sharron Clemente, SLP  Time Calculation: 13 mins

## 2018-08-02 NOTE — TELEPHONE ENCOUNTER
Spoke with pt. Her PCP sent her to [facility] yesterday afternoon. She has been admitted, but states \"they haven't found anything yet. \"    Advised I would pass this on to you.

## 2018-08-02 NOTE — PROGRESS NOTES
Second attempt for OT eval. Pt reports being very fatigued and politely declined. Pt was just admitted from ED.   Will have OT follow up tomorrow for eval.

## 2018-08-02 NOTE — PROCEDURES
[facility]  *** FINAL REPORT ***    Name: Caden Matthew  MRN: [id number]    Inpatient  : 11 Dec 1960  HIS Order #: [id number]  [id number] [facility] Visit #: [id number]  Date: 02 Aug 2018    TYPE OF TEST: Cerebrovascular Duplex    REASON FOR TEST  Arterial occlusion    Right Carotid:-             Proximal               Mid                 Distal  cm/s  Systolic  Diastolic  Systolic  Diastolic  Systolic  Diastolic  CCA:     77.4      17.0                            73.0      26.0  Bulb:  ECA:     58.0       9.0  ICA:     53.0      18.0       76.0      23.0       77.0      33.0  ICA/CCA:  0.7       0.7    ICA Stenosis: Normal    Right Vertebral:-  Finding: Antegrade  Sys:       49.0  Mary:       18.0    Right Subclavian: Normal    Left Carotid:-            Proximal                Mid                 Distal  cm/s  Systolic  Diastolic  Systolic  Diastolic  Systolic  Diastolic  CCA:     57.9      25.0                            67.0      25.0  Bulb:  ECA:     66.0      12.0  ICA:     85.0      39.0       67.0      27.0       87.0      25.0  ICA/CCA:  1.3       1.6    ICA Stenosis: Normal    Left Vertebral:-  Finding: Antegrade  Sys:       51.0  Mary:       21.0    Left Subclavian: Normal    INTERPRETATION/FINDINGS  PROCEDURE:  Carotid Duplex Examination using B-mode, color and  spectral Doppler of the extracranial cerebrovascular arteries. 1. No evidence of significant arterial occlusive disease in the  internal carotid arteries. 2. No significant stenosis in the external carotid arteries  bilaterally. 3. Antegrade flow in both vertebral arteries. 4. Normal flow in both subclavian arteries. ADDITIONAL COMMENTS    I have personally reviewed the data relevant to the interpretation of  this  study. TECHNOLOGIST: Calista Britt. GIULIA Grullon, RDMS  Signed: 2018 09:44 AM    PHYSICIAN: Dana Chavis MD  Signed: 2018 03:55 PM

## 2018-08-02 NOTE — PROGRESS NOTES
Hospitalist Progress Note    NAME: Kar Baez   :  1960   MRN:  [id number]       Assessment / Plan:    Suspected transient ischemic attack  Resolving acute dystonic reaction from  [address]  She reports having new symptoms which are quite different from twitching movement she had about 10 days ago  Exam currently is nonfocal  Initiate complete workup for TIA  Check MRI of the brain-p  ultrasound carotids and 2D echocardiogram- normal   hemoglobin A1c 6.4, TSH normal and fasting lipid profile, LDL 72  She is on aspirin at home which will be held and will change to Plavix 75 mg daily.  Start Lipitor 40 mg daily. Consult PT OT and speech therapy. Consult neurology. 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 Recommended Disposition: Home w/Family     Subjective:     Chief Complaint / Reason for Physician Visit  R sided weakness    Patient reports she has developed migraine she has been having this at home off-and-on and is on Topamax which she had this morning. She says Fioricet helps as well. She thinks that her right-sided weakness in her right side of her face arm and leg has improved. She denies any other symptoms no visual changes. She has not had her MRI. Patient was evaluated at 5 PM    Discussed with RN events overnight.      Review of Systems:  Symptom Y/N Comments  Symptom Y/N Comments   Fever/Chills    Chest Pain n    Poor Appetite    Edema     Cough n   Abdominal Pain n    Sputum n   Joint Pain     SOB/MANN n   Pruritis/Rash     Nausea/vomit n   Tolerating PT/OT     Diarrhea    Tolerating Diet     Constipation    Other       Could NOT obtain due to:      Objective:     VITALS:   Last 24hrs VS reviewed since prior progress note. Most recent are:  Patient Vitals for the past 24 hrs:   Temp Pulse Resp BP SpO2   08/02/18 1432 97.2 °F (36.2 °C) (!) 52 18 112/73 99 %   08/02/18 1300 98.2 °F (36.8 °C) 64 16 119/72 96 %   08/02/18 1100 - 64 - 90/56 97 %   08/02/18 1000 - - - (!) 140/119 98 %   08/02/18 0700 98.1 °F (36.7 °C) (!) 59 16 133/76 95 %   08/02/18 0600 - - - 110/66 93 %   08/02/18 0500 - - - 103/71 94 %   08/02/18 0400 - - - 112/74 95 %   08/02/18 0300 - - - 112/69 99 %   08/02/18 0200 - - - 127/75 97 %   08/02/18 0100 - - - 144/76 95 %   08/02/18 0029 - - - - 96 %   08/02/18 0028 - - - 156/90 -   08/01/18 1814 98.2 °F (36.8 °C) 74 16 145/74 100 %     No intake or output data in the 24 hours ending 08/02/18 3229     PHYSICAL EXAM:  Patient is awake alert seems to be uncomfortable from her headache she is oriented ×3 on room air nontoxic-appearing. Extraocular movements are intact sclera nonicteric conjunctiva are pink no facial asymmetries she has no pronator drift sensation grossly intact lower extremity strength appears symmetric and she was ambulating room without difficulty.  Cardiovascular regular rate and obvious murmurs rubs or gallops lungs are clear without wheezes rhonchi or crackles abdomen benign bowel sounds present soft nontender extremities no clubbing cyanosis or edema    Reviewed most current lab test results and cultures  YES  Reviewed most current radiology test results   YES  Review and summation of old records today    NO  Reviewed patient's current orders and MAR    YES  PMH/SH reviewed - no change comparedy to H&P  ________________________________________________________________________  Care Plan discussed with:    Comments   Patient y    Family  y    RN y    Care Manager     Consultant                        Multidiciplinary team rounds were held today with , nursing, pharmacist and clinical coordinator. Patient's plan of care was discussed; medications were reviewed and discharge planning was addressed. ________________________________________________________________________  Total NON critical care TIME:   Minutes    Total CRITICAL CARE TIME Spent:   Minutes non procedure based      Comments   >50% of visit spent in counseling and coordination of care     ________________________________________________________________________  Bianka Cadena MD     Procedures: see electronic medical records for all procedures/Xrays and details which were not copied into this note but were reviewed prior to creation of Plan. LABS:  I reviewed today's most current labs and imaging studies.   Pertinent labs include:  Recent Labs      08/02/18 0317 08/01/18 1948   WBC  5.4  6.5   HGB  12.3  12.7   HCT  38.7  41.3   PLT  281  318     Recent Labs      08/02/18 0317 08/01/18 1948   NA  142  141   K  3.5  4.4   CL  112*  112*   CO2  23  26   GLU  87  90   BUN  9  12   CREA  0.93  0.98   CA  8.7  8.9   MG   --   2.1   PHOS   --   3.1   ALB   --   3.9   TBILI   --   0.3   SGOT   --   21   ALT   --   26       Signed: Bianka Cadena MD

## 2018-08-02 NOTE — PROGRESS NOTES
Pharmacy Clarification of the Prior to Admission Medication Regimen Retrospective to the Admission Medication Reconciliation    The patient was interviewed regarding clarification of the prior to admission medication regimen and was questioned regarding use of any other inhalers, topical products, over the counter medications, herbal medications, vitamin products or ophthalmic/nasal/otic medication use. Information Obtained From: Rx Query, Patient    Recommendations/Findings: The following amendments were made to the patient's active medication list on file at [address]:     1) Additions:    ALPRAZolam (XANAX XR) 3 mg XR tablet (pt takes both 3 mg ER daily and 1 mg IR PRN)   neomycin-bacitracnZn-polymyxnB (NEOSPORIN) 3.5-400-5,000 mg-unit-unit oipk ointment    2) Removals:    brexpiprazole   chlorzoxazone   botox   ambien    3) Changes:   FLUoxetine (PROZAC) (Old regimen: (strength 10 mg) 20 mg daily /New regimen: (strength 20 mg) 40 mg daily)   levothyroxine (SYNTHROID) (Old regimen: (strength 25 mcg) 75 mcg daily before breakfast /New regimen: (strength 75 mcg) 75 mcg daily before breakfast)   magnesium 250 mg tab  (Old regimen: (strength 500 mg ) take 250 mg daily/New regimen: (strength 250 mg) take 250 mg daily)    4) Pertinent Pharmacy Findings:    Shantelle Burroughs (AIMOVIG AUTOINJECTOR, 2 PACK,): pt stated she has not begun using this agent    PTA medication list was corrected to the following:     Prior to Admission Medications   Prescriptions Last Dose Informant Patient Reported? Taking? ALPRAZolam (XANAX XR) 3 mg XR tablet 7/31/2018 at Unknown time Self Yes Yes   Sig: Take 3 mg by mouth daily. Pt takes 3 mg ER daily and 1 mg PRN    ALPRAZolam (XANAX) 1 mg tablet 7/19/2018 at Unknown time Self Yes Yes   Sig: Take 1 mg by mouth daily as needed for Anxiety. Pt takes 3 mg ER daily and 1 mg PRN   FLUoxetine (PROZAC) 20 mg tablet 7/31/2018 Self Yes Yes   Sig: Take 40 mg by mouth daily.    acetaminophen (TYLENOL) 325 mg tablet 2018 at Unknown time Self Yes Yes   Sig: Take 650 mg by mouth every four (4) hours as needed for Pain. aspirin 81 mg chewable tablet 2018 at Unknown time Self Yes No   Sig: Take 81 mg by mouth daily. busPIRone (BUSPAR) 5 mg tablet 2018 at Unknown time Self Yes Yes   Sig: TAKE 1 TABLET BY MOUTH QHS AS NEEDED FOR ANXIETY   butalbital-acetaminophen-caff (FIORICET) -40 mg per capsule 2018 at Unknown time Self Yes Yes   Sig: Take 1 Cap by mouth two (2) times daily as needed for Pain or Headache (twice daily). cyclobenzaprine (FLEXERIL) 5 mg tablet 2018 at Unknown time Self Yes Yes   Sig: Take 5 mg by mouth nightly as needed for Muscle Spasm(s). dextroamphetamine-amphetamine (ADDERALL) 20 mg tablet 2018 at Unknown time Self Yes Yes   Sig: Take 20 mg by mouth two (2) times a day. erenumab-aooe (AIMOVIG AUTOINJECTOR, 2 PACK,) 70 mg/mL atIn Not Taking at Unknown time Self No No   Si Syringe by SubCUTAneous route every month. levothyroxine (SYNTHROID) 75 mcg tablet 2018 Self Yes Yes   Sig: Take 75 mcg by mouth Daily (before breakfast). magnesium 250 mg tab 2018 Self Yes Yes   Sig: Take 1 Tab by mouth nightly. methocarbamol (ROBAXIN) 750 mg tablet 2018 at Unknown time Self Yes Yes   Sig: Take 750 mg by mouth four (4) times daily as needed. Pt uses PRN for spasms and cramping   neomycin-bacitracnZn-polymyxnB (NEOSPORIN) 3.5-400-5,000 mg-unit-unit oipk ointment 2018 at Unknown time Self Yes Yes   Sig: Apply 1 Packet to affected area two (2) times daily as needed (cuts). temazepam (RESTORIL) 15 mg capsule 2018 at Unknown time Self Yes Yes   Sig: Take 15 mg by mouth nightly as needed for Sleep. topiramate (TOPAMAX) 100 mg tablet 2018 at Unknown time Self Yes Yes   Sig: Take 100 mg by mouth two (2) times a day. Facility-Administered Medications: None       Thank you,  Nathen HusseinD.  Candidate 2019

## 2018-08-02 NOTE — PROGRESS NOTES
MRI cannot be scheduled at this time, pending documentation of make/ model of aneurysm coil. Nurse aware via phone.

## 2018-08-02 NOTE — H&P
Hospitalist Admission Note    NAME: Marianne Dias   :  1960   MRN:  [id number]     Date/Time:  2018 10:53 PM    Patient PCP: Donis Echeverria MD  ________________________________________________________________________    My assessment of this patient's clinical condition and my plan of care is as follows. Assessment / Plan:  Suspected transient ischemic attack  Resolving acute dystonic reaction from  [address]  She reports having new symptoms which are quite different from twitching movement she had about 10 days ago  Exam currently is nonfocal  Initiate complete workup for TIA  Check MRI of the brain, ultrasound carotids and 2D echocardiogram  Check hemoglobin A1c, TSH and fasting lipid profile  She is on aspirin at home which will be held and will change to Plavix 75 mg daily.  Start Lipitor 40 mg daily. Consult PT OT and speech therapy. Consult neurology.   Monitor for any worsening of dystonic reaction currently patient reports that her twitching movements are much better    Hypothyroidism  Resume home levothyroxine    Depression with generalized anxiety  Resume home Prozac  Hold Xanax as she is already on [address] [facility] for medication reconciliation due to multiple interactions among medications    Chronic migraine  On Tylenol as needed for headache  Resume other home meds after med rec is complete due to interaction    Hx of rt internal carotid artery aneurysm s/p stent placement        Code Status: full  Surrogate Decision Maker:   Lenny Virk    DVT Prophylaxis: heparin  GI Prophylaxis: not indicated    Baseline: From home independent        Subjective:   CHIEF COMPLAINT: Rt sided weakness    HISTORY OF PRESENT ILLNESS:     This a 56-year-old female with past medical history of chronic migraines, hypothyroidism, depression, generalized anxiety disorder is coming to the hospital with chief complaints of right-sided weakness since last 2 days.  Patient reports being in his usual state of health until about 10 days ago when she developed twitching movements involving the right side of her face, neck and also had some twitching since the right home.  She presented to [facility] for above complaints and was told that she might be having a dystonic reaction secondary to medication (Rexulti) and was advised to stop this medication was discharged for outpatient follow-up.  She reports that after going home for twitching movements got better but developed new right arm and leg weakness since the last 2 days. Laura Torres is also having intermittent problems with vision which she describes as vision loss.  Does not report any facial deviation, slurred speech.  She denies loss of consciousness.  She denies seizure-like activity.  She denies passing out. On arrival to the hospital, she had a CT of brain which did not reveal any acute process.  Her neurological exam was completely benign upon arrival.    We were asked to admit for work up and evaluation of the above problems. Past Medical History:   Diagnosis Date    Anxiety disorder     Depression     Headache     Spinal stenosis     Thyroid disease     hypothyroid        Past Surgical History:   Procedure Laterality Date    HX HEENT      HX LUMBAR FUSION  03/16/2018    HX ORTHOPAEDIC  2002    ACL repair, pt unsure which side    HX ORTHOPAEDIC  2013    left shoulder surgery    SD COLONOSCOPY FLX DX W/COLLJ SPEC WHEN PFRMD  5/6/2013            Social History   Substance Use Topics    Smoking status: Never Smoker    Smokeless tobacco: Never Used    Alcohol use 1.2 oz/week     2 Glasses of wine per week      Comment: socially        History reviewed. No pertinent family history. Allergies   Allergen Reactions    Percocet [Oxycodone-Acetaminophen] Itching        Prior to Admission medications    Medication Sig Start Date End Date Taking?  Authorizing Provider   temazepam (RESTORIL) 15 mg capsule Take 15 mg by mouth nightly as needed for Sleep. Yes Demetrius Rubin MD   topiramate (TOPAMAX) 100 mg tablet Take  by mouth two (2) times a day. Yes Historical Provider   magnesium oxide 500 mg tab Take 250 mg by mouth. Yes Historical Provider   ALPRAZolam Gerianne Drain) 1 mg tablet 3mg XR every morning 2/21/17  Yes Historical Provider   FLUoxetine (PROZAC) 10 mg tablet Take 20 mg by mouth daily. Yes Historical Provider   dextroamphetamine-amphetamine (ADDERALL) 30 mg tablet Take 20 mg by mouth two (2) times a day. Dextroamphetamine-amphetamine 30 mg tab administration instructions: Take one tab orally every morning and 1/2 tab (15 mg) every afternoon)          Yes Historical Provider   levothyroxine (SYNTHROID) 25 mcg tablet Take 75 mcg by mouth Daily (before breakfast). Yes Historical Provider   brexpiprazole (REXULTI) 1 mg tab tablet Take  by mouth daily. Historical Provider   butalbital-acetaminophen-caff (FIORICET) -40 mg per capsule Take  by mouth every four (4) hours as needed for Pain. Historical Provider   erenumab-aooe (AIMOVIG AUTOINJECTOR, 2 PACK,) 70 mg/mL atIn 1 Syringe by SubCUTAneous route every month. 7/11/18   Mely Bolivar DO   aspirin 81 mg chewable tablet Take 81 mg by mouth daily. Historical Provider   CHLORZOXAZONE PO Take  by mouth. Historical Provider   ZOLPIDEM TARTRATE (AMBIEN PO) Take  by mouth. Historical Provider   methocarbamol (ROBAXIN) 750 mg tablet Take  by mouth four (4) times daily. Historical Provider   acetaminophen (TYLENOL) 325 mg tablet Take  by mouth every four (4) hours as needed for Pain. Historical Provider   onabotulinumtoxinA (BOTOX) 200 unit injection 200 Units by IntraMUSCular route every three (3) months.  Inject selected muscles head and neck bilaterally every 3 months  Indications: MIGRAINE PREVENTION 4/16/18   Mely Bolivar DO   busPIRone (BUSPAR) 5 mg tablet TAKE 1/2 TO 1 TABLET BY MOUTH AS NEEDED FOR ANXIETY UP TO TWICE DAILY 10/13/17   Historical Provider cyclobenzaprine (FLEXERIL) 5 mg tablet Take 5 mg by mouth. Historical Provider       REVIEW OF SYSTEMS:     I am not able to complete the review of systems because: The patient is intubated and sedated    The patient has altered mental status due to his acute medical problems    The patient has baseline aphasia from prior stroke(s)    The patient has baseline dementia and is not reliable historian    The patient is in acute medical distress and unable to provide information           Total of 12 systems reviewed as follows:       POSITIVE= underlined text  Negative = text not underlined  General:  fever, chills, sweats, generalized weakness, weight loss/gain,      loss of appetite   Eyes:    blurred vision, eye pain, loss of vision, double vision  ENT:    rhinorrhea, pharyngitis   Respiratory:   cough, sputum production, SOB, MANN, wheezing, pleuritic pain   Cardiology:   chest pain, palpitations, orthopnea, PND, edema, syncope   Gastrointestinal:  abdominal pain , N/V, diarrhea, dysphagia, constipation, bleeding   Genitourinary:  frequency, urgency, dysuria, hematuria, incontinence   Muskuloskeletal :  arthralgia, myalgia, back pain  Hematology:  easy bruising, nose or gum bleeding, lymphadenopathy   Dermatological: rash, ulceration, pruritis, color change / jaundice  Endocrine:   hot flashes or polydipsia   Neurological:  headache, dizziness, confusion, focal weakness, paresthesia,     Speech difficulties, memory loss, gait difficulty  Psychological: Feelings of anxiety, depression, agitation    Objective:   VITALS:    Visit Vitals    /74 (BP 1 Location: Right arm, BP Patient Position: At rest)    Pulse 74    Temp 98.2 °F (36.8 °C)    Resp 16    Ht 4' 11\" (1.499 m)    Wt 55.5 kg (122 lb 5.7 oz)    SpO2 100%    BMI 24.71 kg/m2       PHYSICAL EXAM:    General:    Alert, cooperative, no distress, appears stated age.      HEENT: Atraumatic, anicteric sclerae, pink conjunctivae     No oral ulcers, mucosa moist  Neck:  Supple, symmetrical,  thyroid: non tender  Lungs:   Clear to auscultation bilaterally. No Wheezing or Rhonchi. No rales. Chest wall:  No tenderness  No Accessory muscle use. Heart:   Regular  rhythm,  No  murmur   No edema  Abdomen:   Soft, non-tender. Not distended. Bowel sounds normal  Extremities: No cyanosis. No clubbing,      Skin turgor normal, Capillary refill normal, Radial dial pulse 2+  Skin:     Not Jaundiced  No rashes   Psych:  Not anxious or agitated. Neurologic: EOMs intact. No facial asymmetry. No aphasia or slurred speech. Symmetrical strength, Sensation grossly intact. Alert and oriented X 4.     _______________________________________________________________________  Care Plan discussed with:    Comments   Patient y    Family  y    RN y    Care Manager                    Consultant:      _______________________________________________________________________  Expected  Disposition:   Home with Family y   HH/PT/OT/RN    SNF/LTC    [facility]    ________________________________________________________________________  TOTAL TIME: 48 Minutes    Critical Care Provided     Minutes non procedure based      Comments    y Reviewed previous records   >50% of visit spent in counseling and coordination of care y Discussion with patient and/or family and questions answered       ________________________________________________________________________  Signed: Aristeo Hines MD    Procedures: see electronic medical records for all procedures/Xrays and details which were not copied into this note but were reviewed prior to creation of Plan.     LAB DATA REVIEWED:    Recent Results (from the past 24 hour(s))   CBC WITH AUTOMATED DIFF    Collection Time: 08/01/18  7:48 PM   Result Value Ref Range    WBC 6.5 3.6 - 11.0 K/uL    RBC 4.85 3.80 - 5.20 M/uL    HGB 12.7 11.5 - 16.0 g/dL    HCT 41.3 35.0 - 47.0 %    MCV 85.2 80.0 - 99.0 FL    MCH 26.2 26.0 - 34.0 PG    MCHC 30.8 30.0 - 36.5 g/dL RDW 14.4 11.5 - 14.5 %    PLATELET 098 379 - 184 K/uL    MPV 9.9 8.9 - 12.9 FL    NRBC 0.0 0  WBC    ABSOLUTE NRBC 0.00 0.00 - 0.01 K/uL    NEUTROPHILS 57 32 - 75 %    LYMPHOCYTES 34 12 - 49 %    MONOCYTES 8 5 - 13 %    EOSINOPHILS 1 0 - 7 %    BASOPHILS 1 0 - 1 %    IMMATURE GRANULOCYTES 0 0.0 - 0.5 %    ABS. NEUTROPHILS 3.7 1.8 - 8.0 K/UL    ABS. LYMPHOCYTES 2.2 0.8 - 3.5 K/UL    ABS. MONOCYTES 0.5 0.0 - 1.0 K/UL    ABS. EOSINOPHILS 0.0 0.0 - 0.4 K/UL    ABS. BASOPHILS 0.0 0.0 - 0.1 K/UL    ABS. IMM. GRANS. 0.0 0.00 - 0.04 K/UL    DF AUTOMATED     METABOLIC PANEL, COMPREHENSIVE    Collection Time: 08/01/18  7:48 PM   Result Value Ref Range    Sodium 141 136 - 145 mmol/L    Potassium 4.4 3.5 - 5.1 mmol/L    Chloride 112 (H) 97 - 108 mmol/L    CO2 26 21 - 32 mmol/L    Anion gap 3 (L) 5 - 15 mmol/L    Glucose 90 65 - 100 mg/dL    BUN 12 6 - 20 MG/DL    Creatinine 0.98 0.55 - 1.02 MG/DL    BUN/Creatinine ratio 12 12 - 20      GFR est AA >60 >60 ml/min/1.73m2    GFR est non-AA 59 (L) >60 ml/min/1.73m2    Calcium 8.9 8.5 - 10.1 MG/DL    Bilirubin, total 0.3 0.2 - 1.0 MG/DL    ALT (SGPT) 26 12 - 78 U/L    AST (SGOT) 21 15 - 37 U/L    Alk.  phosphatase 88 45 - 117 U/L    Protein, total 7.7 6.4 - 8.2 g/dL    Albumin 3.9 3.5 - 5.0 g/dL    Globulin 3.8 2.0 - 4.0 g/dL    A-G Ratio 1.0 (L) 1.1 - 2.2     MAGNESIUM    Collection Time: 08/01/18  7:48 PM   Result Value Ref Range    Magnesium 2.1 1.6 - 2.4 mg/dL   PHOSPHORUS    Collection Time: 08/01/18  7:48 PM   Result Value Ref Range    Phosphorus 3.1 2.6 - 4.7 MG/DL   EKG, 12 LEAD, INITIAL    Collection Time: 08/01/18  9:24 PM   Result Value Ref Range    Ventricular Rate 50 BPM    Atrial Rate 50 BPM    P-R Interval 142 ms    QRS Duration 74 ms    Q-T Interval 480 ms    QTC Calculation (Bezet) 437 ms    Calculated P Axis 37 degrees    Calculated R Axis 9 degrees    Calculated T Axis 33 degrees    Diagnosis       Sinus bradycardia  Septal infarct , age undetermined  When compared with ECG of 08-MAR-2017 14:31,  No significant change was found

## 2018-08-02 NOTE — CONSULTS
NEUROLOGY CONSULT NOTE    Patient ID:  Agatha Barrow  [id number]  89 y.o.  1960    Date of Consultation:  August 2, 2018    Referring Physician: Dr. Bailee Ponce    Reason for Consultation: right arm weakness    History of Present Illness:     Patient Active Problem List    Diagnosis Date Noted    TIA (transient ischemic attack) 08/01/2018    Recurrent depression ([address] 01/16/2018    Headache 03/11/2017     Past Medical History:   Diagnosis Date    Anxiety disorder     Depression     Headache     Spinal stenosis     Thyroid disease     hypothyroid      Past Surgical History:   Procedure Laterality Date    HX HEENT      HX LUMBAR FUSION  03/16/2018    HX ORTHOPAEDIC  2002    ACL repair, pt unsure which side    HX ORTHOPAEDIC  2013    left shoulder surgery    [place] COLONOSCOPY FLX DX W/COLLJ SPEC WHEN PFRMD  5/6/2013           Prior to Admission medications    Medication Sig Start Date End Date Taking? Authorizing Provider   ALPRAZolam Giovanny Brisk) 1 mg tablet Take 1 mg by mouth daily as needed for Anxiety. Pt takes 3 mg ER daily and 1 mg PRN   Yes Historical Provider   dextroamphetamine-amphetamine (ADDERALL) 20 mg tablet Take 20 mg by mouth two (2) times a day. Yes Historical Provider   FLUoxetine (PROZAC) 20 mg tablet Take 20 mg by mouth daily. Yes Historical Provider   levothyroxine (SYNTHROID) 75 mcg tablet Take 75 mcg by mouth Daily (before breakfast). Yes Historical Provider   magnesium 250 mg tab Take 1 Tab by mouth nightly. Yes Historical Provider   ALPRAZolam (XANAX XR) 3 mg XR tablet Take 3 mg by mouth every morning. Pt takes 3 mg ER daily and 1 mg PRN   Yes Historical Provider   neomycin-bacitracnZn-polymyxnB (NEOSPORIN) 3.5-400-5,000 mg-unit-unit oipk ointment Apply 1 Packet to affected area two (2) times daily as needed (cuts). Yes Historical Provider   temazepam (RESTORIL) 15 mg capsule Take 15 mg by mouth nightly as needed for Sleep.    Yes Phys Scottie, MD   topiramate (TOPAMAX) 100 mg tablet Take  by mouth two (2) times a day. Yes Historical Provider   butalbital-acetaminophen-caff (FIORICET) -40 mg per capsule Take 1 Cap by mouth every four (4) hours as needed for Pain (twice daily). Yes Historical Provider   methocarbamol (ROBAXIN) 750 mg tablet Take 750 mg by mouth four (4) times daily. Pt uses PRN for spasms and cramping   Yes Historical Provider   acetaminophen (TYLENOL) 325 mg tablet Take  by mouth every four (4) hours as needed for Pain. Yes Historical Provider   busPIRone (BUSPAR) 5 mg tablet TAKE 1 TABLET BY MOUTH AS NEEDED FOR ANXIETY UP TO TWICE DAILY 10/13/17  Yes Historical Provider   cyclobenzaprine (FLEXERIL) 5 mg tablet Take 5 mg by mouth nightly as needed. Yes Historical Provider   erenumab-aooe (AIMOVIG AUTOINJECTOR, 2 PACK,) 70 mg/mL atIn 1 Syringe by SubCUTAneous route every month. 7/11/18   Mely Bolivar,    aspirin 81 mg chewable tablet Take 81 mg by mouth daily. Historical Provider     No Known Allergies   Social History   Substance Use Topics    Smoking status: Never Smoker    Smokeless tobacco: Never Used    Alcohol use 1.2 oz/week     2 Glasses of wine per week      Comment: socially      History reviewed. No pertinent family history. Subjective:      Alicja Szymanski is a 56 y.o. CQOS with history of chronic migraine headaches (Botox), anxiety, depression, thyroid disease and spinal stenosis who was admitted from the ER for right arm weakness. Per patient and record she has been having right arm mainly proximal weakness with use for about 1 week. She had a GLF falling on her right side and scraping her right elbow. No numbness. No issues with use of her right hand. She was apparently seen at [facility] 1 week PTC and diagnosed with dystonic reaction to [address]. No speech changes, vision changes, focal numbness, urinary or bowel changes. Recently received her first Botox treatment for migraines care of Dr. Terese Astorga.     Outside reports reviewed: operative report, ER records, radiology reports, lab reports. Review of Systems:    Pertinent items are noted in HPI. Objective:     Patient Vitals for the past 8 hrs:   BP Temp Pulse Resp SpO2   08/02/18 1100 90/56 - 64 - 97 %   08/02/18 1000 (!) 140/119 - - - 98 %   08/02/18 0700 133/76 98.1 °F (36.7 °C) (!) 59 16 95 %   08/02/18 0600 110/66 - - - 93 %   08/02/18 0500 103/71 - - - 94 %           NEUROLOGICAL EXAM:    Appearance: The patient is thin, well nourished, provides a coherent history and is in no acute distress. Mental Status: Oriented to time, place and person. Fluent, no aphasia or dysarthria. Mood and affect appropriate. Cranial Nerves:   Intact visual fields. [place]OD[place], EOM's full, no nystagmus, no ptosis. Facial sensation is normal. Corneal reflexes are intact. Facial movement is symmetric. Hearing is normal bilaterally. Palate is midline with normal sternocleidomastoid and trapezius muscles are normal. Tongue is midline. Motor:  5/5 strength in upper and lower proximal and distal muscles on individual muscle testing. Right upper extremity drift and then complains of right shoulder and deltoid discomfort. (+) tenderness on palpation right shoulder joint area. Normal bulk and tone. Reflexes:   Deep tendon reflexes 2+/4 and symmetrical. Toes downgoing. Sensory:   Normal to cold, pinprick and vibration. Gait:  Normal gait. Normal bilateral arm swing. Tremor:   No tremor noted. Cerebellar:  No cerebellar signs present. Neurovascular:  Normal heart sounds and regular rhythm, peripheral pulses intact, and no carotid bruits.        Imaging  CT Head: reviewed    Lab Review    Recent Results (from the past 24 hour(s))   CBC WITH AUTOMATED DIFF    Collection Time: 08/01/18  7:48 PM   Result Value Ref Range    WBC 6.5 3.6 - 11.0 K/uL    RBC 4.85 3.80 - 5.20 M/uL    HGB 12.7 11.5 - 16.0 g/dL    HCT 41.3 35.0 - 47.0 %    MCV 85.2 80.0 - 99.0 FL    MCH 26.2 26.0 - 34.0 PG    MCHC 30.8 30.0 - 36.5 g/dL    RDW 14.4 11.5 - 14.5 %    PLATELET 061 815 - 846 K/uL    MPV 9.9 8.9 - 12.9 FL    NRBC 0.0 0  WBC    ABSOLUTE NRBC 0.00 0.00 - 0.01 K/uL    NEUTROPHILS 57 32 - 75 %    LYMPHOCYTES 34 12 - 49 %    MONOCYTES 8 5 - 13 %    EOSINOPHILS 1 0 - 7 %    BASOPHILS 1 0 - 1 %    IMMATURE GRANULOCYTES 0 0.0 - 0.5 %    ABS. NEUTROPHILS 3.7 1.8 - 8.0 K/UL    ABS. LYMPHOCYTES 2.2 0.8 - 3.5 K/UL    ABS. MONOCYTES 0.5 0.0 - 1.0 K/UL    ABS. EOSINOPHILS 0.0 0.0 - 0.4 K/UL    ABS. BASOPHILS 0.0 0.0 - 0.1 K/UL    ABS. IMM. GRANS. 0.0 0.00 - 0.04 K/UL    DF AUTOMATED     METABOLIC PANEL, COMPREHENSIVE    Collection Time: 08/01/18  7:48 PM   Result Value Ref Range    Sodium 141 136 - 145 mmol/L    Potassium 4.4 3.5 - 5.1 mmol/L    Chloride 112 (H) 97 - 108 mmol/L    CO2 26 21 - 32 mmol/L    Anion gap 3 (L) 5 - 15 mmol/L    Glucose 90 65 - 100 mg/dL    BUN 12 6 - 20 MG/DL    Creatinine 0.98 0.55 - 1.02 MG/DL    BUN/Creatinine ratio 12 12 - 20      GFR est AA >60 >60 ml/min/1.73m2    GFR est non-AA 59 (L) >60 ml/min/1.73m2    Calcium 8.9 8.5 - 10.1 MG/DL    Bilirubin, total 0.3 0.2 - 1.0 MG/DL    ALT (SGPT) 26 12 - 78 U/L    AST (SGOT) 21 15 - 37 U/L    Alk.  phosphatase 88 45 - 117 U/L    Protein, total 7.7 6.4 - 8.2 g/dL    Albumin 3.9 3.5 - 5.0 g/dL    Globulin 3.8 2.0 - 4.0 g/dL    A-G Ratio 1.0 (L) 1.1 - 2.2     MAGNESIUM    Collection Time: 08/01/18  7:48 PM   Result Value Ref Range    Magnesium 2.1 1.6 - 2.4 mg/dL   PHOSPHORUS    Collection Time: 08/01/18  7:48 PM   Result Value Ref Range    Phosphorus 3.1 2.6 - 4.7 MG/DL   EKG, 12 LEAD, INITIAL    Collection Time: 08/01/18  9:24 PM   Result Value Ref Range    Ventricular Rate 50 BPM    Atrial Rate 50 BPM    P-R Interval 142 ms    QRS Duration 74 ms    Q-T Interval 480 ms    QTC Calculation (Bezet) 437 ms    Calculated P Axis 37 degrees    Calculated R Axis 9 degrees    Calculated T Axis 33 degrees    Diagnosis       Sinus bradycardia  Loss of anteroseptal forces  When compared with ECG of 08-MAR-2017 14:31,  No significant change was found  Confirmed by Ilsa Obregon ([id number]) on 8/2/2018 11:29:48 AM     TROPONIN I    Collection Time: 08/01/18 10:32 PM   Result Value Ref Range    Troponin-I, Qt. <0.05 <0.05 ng/mL   URINALYSIS W/ REFLEX CULTURE    Collection Time: 08/01/18 11:43 PM   Result Value Ref Range    Color YELLOW/STRAW      Appearance CLOUDY (A) CLEAR      Specific gravity 1.011 1.003 - 1.030      pH (UA) 7.5 5.0 - 8.0      Protein NEGATIVE  NEG mg/dL    Glucose NEGATIVE  NEG mg/dL    Ketone NEGATIVE  NEG mg/dL    Bilirubin NEGATIVE  NEG      Blood NEGATIVE  NEG      Urobilinogen 0.2 0.2 - 1.0 EU/dL    Nitrites NEGATIVE  NEG      Leukocyte Esterase MODERATE (A) NEG      WBC 0-4 0 - 4 /hpf    RBC 0-5 0 - 5 /hpf    Epithelial cells FEW FEW /lpf    Bacteria 1+ (A) NEG /hpf    UA:UC IF INDICATED URINE CULTURE ORDERED (A) CNI     LIPID PANEL    Collection Time: 08/02/18  3:17 AM   Result Value Ref Range    LIPID PROFILE          Cholesterol, total 166 <200 MG/DL    Triglyceride 97 <150 MG/DL    HDL Cholesterol 74 MG/DL    LDL, calculated 72.6 0 - 100 MG/DL    VLDL, calculated 19.4 MG/DL    CHOL/HDL Ratio 2.2 0 - 5.0     HEMOGLOBIN A1C WITH EAG    Collection Time: 08/02/18  3:17 AM   Result Value Ref Range    Hemoglobin A1c 6.4 (H) 4.2 - 6.3 %    Est. average glucose 137 mg/dL   CBC W/O DIFF    Collection Time: 08/02/18  3:17 AM   Result Value Ref Range    WBC 5.4 3.6 - 11.0 K/uL    RBC 4.67 3.80 - 5.20 M/uL    HGB 12.3 11.5 - 16.0 g/dL    HCT 38.7 35.0 - 47.0 %    MCV 82.9 80.0 - 99.0 FL    MCH 26.3 26.0 - 34.0 PG    MCHC 31.8 30.0 - 36.5 g/dL    RDW 14.5 11.5 - 14.5 %    PLATELET 969 864 - 624 K/uL    MPV 9.6 8.9 - 12.9 FL    NRBC 0.0 0  WBC    ABSOLUTE NRBC 0.00 0.00 - 0.01 K/uL   TSH 3RD GENERATION    Collection Time: 08/02/18  3:17 AM   Result Value Ref Range    TSH 1.84 0.36 - 9.89 uIU/mL   METABOLIC PANEL, BASIC    Collection Time: 08/02/18  3:17 AM Result Value Ref Range    Sodium 142 136 - 145 mmol/L    Potassium 3.5 3.5 - 5.1 mmol/L    Chloride 112 (H) 97 - 108 mmol/L    CO2 23 21 - 32 mmol/L    Anion gap 7 5 - 15 mmol/L    Glucose 87 65 - 100 mg/dL    BUN 9 6 - 20 MG/DL    Creatinine 0.93 0.55 - 1.02 MG/DL    BUN/Creatinine ratio 10 (L) 12 - 20      GFR est AA >60 >60 ml/min/1.73m2    GFR est non-AA >60 >60 ml/min/1.73m2    Calcium 8.7 8.5 - 10.1 MG/DL   TROPONIN I    Collection Time: 08/02/18  3:17 AM   Result Value Ref Range    Troponin-I, Qt. <0.05 <0.05 ng/mL         Assessment:   Right proximal arm weakness    Plan:   Neurological examination does not reveal any actual right upper extremity muscle weakness. Only sense of weakness is seen with right UE drift but then complains of right shoulder and deltoid discomfort. Suspect issue is not central. X-ray of right shoulder was ordered. Head CT was unremarkable. Awaiting Brain MRI without contrast to rule out a subtle stroke. LDL at goal. Carotid doppler and echocardiogram was unremarkable. Baseline evaluation by PT and OT. Thank you for the consult.

## 2018-08-02 NOTE — ED PROVIDER NOTES
EMERGENCY DEPARTMENT HISTORY AND PHYSICAL EXAM      Date: 8/1/2018  Patient Name: Benny Gaona    History of Presenting Illness     Chief Complaint   Patient presents with    Other     Patient ambulatory to triage with steady gait and complain of twitching was seen at [facility] on Monday for same complaint and was told to stop taking Rexulit due to a dystonic recation. History Provided By: Patient    HPI: Benny Gaona, 56 y.o. female with PMHx significant for HA, hypothyroid, anxiety, presents ambulatory to the ED with cc of right sided weakness x 1 week. She notes that she had GLF 3 days ago and scraped her right elbow. She denies hitting her head or LOC. Pt states she saw her physical therapist today who would not do PT on her until she was evaluated for her weakness. She noticed a decrease in her rue strength testing compared to her left; She notes she was seen at [facility] 1 week ago for dystonic reaction to rexulti where she had a normal CBC and CMP. Pt states she was told to stop taking her Rexulti, as the doctors at [facility] believed it was responsible for the dystonic reaction. She was given benadryl; She reports an improvement in the dystonic sxs since she stopped taking Rexulti but that it still returns intermittently. Pt denies CP or SOB. She denies facial droop. NO speech difficulty; no vision changes; There are no other complaints, changes, or physical findings at this time. PCP: Rl Pena MD    Current Facility-Administered Medications   Medication Dose Route Frequency Provider Last Rate Last Dose    acetaminophen (TYLENOL) tablet 325 mg  325 mg Oral Q4H PRN Bart Bamberger, MD        busPIRone (BUSPAR) tablet 5 mg  5 mg Oral DAILY PRN Bart Bamberger, MD        cyclobenzaprine (FLEXERIL) tablet 5 mg  5 mg Oral QHS Bart Bamberger, MD        [START ON 8/2/2018] dextroamphetamine-amphetamine (ADDERALL) tablet 1 Tab  20 mg Oral BID Bart Bamberger, MD        . PHARMACY TO SUBSTITUTE PER PROTOCOL (Reordered from: FLUoxetine (PROZAC) 10 mg tablet)    Per Protocol Dago Montalvo MD       [address] [place] [address] [START ON 8/2/2018] levothyroxine (SYNTHROID) tablet 75 mcg  75 mcg Oral ACB Dago Montalvo MD        methocarbamol (ROBAXIN) tablet 750 mg  750 mg Oral QID Dago Montalvo MD        temazepam (RESTORIL) capsule 15 mg  15 mg Oral QHS PRN Dago Montalvo MD        [START ON 8/2/2018] topiramate (TOPAMAX) tablet 100 mg  100 mg Oral BID Dago Montalvo MD        sodium chloride (NS) flush 5-10 mL  5-10 mL IntraVENous Q8H Dago Montalvo MD        sodium chloride (NS) flush 5-10 mL  5-10 mL IntraVENous PRN Dago Montalvo MD        clopidogrel (PLAVIX) tablet 75 mg  75 mg Oral DAILY Dago Montalvo MD        atorvastatin (LIPITOR) tablet 40 mg  40 mg Oral QHS Dago Montalvo MD        heparin (porcine) injection 5,000 Units  5,000 Units SubCUTAneous Pushpa Gan MD         Current Outpatient Prescriptions   Medication Sig Dispense Refill    temazepam (RESTORIL) 15 mg capsule Take 15 mg by mouth nightly as needed for Sleep.  topiramate (TOPAMAX) 100 mg tablet Take  by mouth two (2) times a day.  magnesium oxide 500 mg tab Take 250 mg by mouth.  ALPRAZolam (XANAX) 1 mg tablet 3mg XR every morning  1    FLUoxetine (PROZAC) 10 mg tablet Take 20 mg by mouth daily.  dextroamphetamine-amphetamine (ADDERALL) 30 mg tablet Take 20 mg by mouth two (2) times a day. Dextroamphetamine-amphetamine 30 mg tab administration instructions: Take one tab orally every morning and 1/2 tab (15 mg) every afternoon)             levothyroxine (SYNTHROID) 25 mcg tablet Take 75 mcg by mouth Daily (before breakfast).  brexpiprazole (REXULTI) 1 mg tab tablet Take  by mouth daily.  butalbital-acetaminophen-caff (FIORICET) -40 mg per capsule Take  by mouth every four (4) hours as needed for Pain.  erenumab-aooe (AIMOVIG AUTOINJECTOR, 2 PACK,) 70 mg/mL atIn 1 Syringe by SubCUTAneous route every month.  2 Syringe 0    aspirin 81 mg chewable tablet Take 81 mg by mouth daily.  CHLORZOXAZONE PO Take  by mouth.  ZOLPIDEM TARTRATE (AMBIEN PO) Take  by mouth.  methocarbamol (ROBAXIN) 750 mg tablet Take  by mouth four (4) times daily.  acetaminophen (TYLENOL) 325 mg tablet Take  by mouth every four (4) hours as needed for Pain.  onabotulinumtoxinA (BOTOX) 200 unit injection 200 Units by IntraMUSCular route every three (3) months. Inject selected muscles head and neck bilaterally every 3 months  Indications: MIGRAINE PREVENTION 200 Units 3    busPIRone (BUSPAR) 5 mg tablet TAKE 1/2 TO 1 TABLET BY MOUTH AS NEEDED FOR ANXIETY UP TO TWICE DAILY  2    cyclobenzaprine (FLEXERIL) 5 mg tablet Take 5 mg by mouth. Past History     Past Medical History:  Past Medical History:   Diagnosis Date    Anxiety disorder     Depression     Headache     Spinal stenosis     Thyroid disease     hypothyroid       Past Surgical History:  Past Surgical History:   Procedure Laterality Date    HX HEENT      HX LUMBAR FUSION  03/16/2018    HX ORTHOPAEDIC  2002    ACL repair, pt unsure which side    HX ORTHOPAEDIC  2013    left shoulder surgery    PA COLONOSCOPY FLX DX W/COLLJ SPEC WHEN PFRMD  5/6/2013            Family History:  History reviewed. No pertinent family history. Social History:  Social History   Substance Use Topics    Smoking status: Never Smoker    Smokeless tobacco: Never Used    Alcohol use 1.2 oz/week     2 Glasses of wine per week      Comment: socially       Allergies: Allergies   Allergen Reactions    Percocet [Oxycodone-Acetaminophen] Itching         Review of Systems   Review of Systems   Constitutional: Negative for activity change, chills and fever. HENT: Negative for congestion and sore throat. Eyes: Negative for pain and redness. Respiratory: Negative for cough, chest tightness and shortness of breath. Cardiovascular: Negative for chest pain and palpitations. Gastrointestinal: Negative for abdominal pain, diarrhea, nausea and vomiting. Genitourinary: Negative for dysuria, frequency and urgency. Musculoskeletal: Negative for back pain and neck pain. Skin: Positive for wound (abrasion to right elbow). Negative for rash. Neurological: Positive for weakness (right sided). Negative for syncope, light-headedness and headaches. Psychiatric/Behavioral: Negative for confusion. All other systems reviewed and are negative. Physical Exam   Physical Exam   Constitutional: She is oriented to person, place, and time. She appears well-developed and well-nourished. No distress. HENT:   Head: Normocephalic and atraumatic. Nose: Nose normal.   Mouth/Throat: Oropharynx is clear and moist.   Eyes: Conjunctivae and EOM are normal. Pupils are equal, round, and reactive to light. No scleral icterus. Conjunctiva clear bilaterally; Neck: Normal range of motion. Neck supple. No JVD present. No tracheal deviation present. No thyromegaly present. Cardiovascular: Normal rate, regular rhythm and intact distal pulses. Exam reveals no gallop and no friction rub. No murmur heard. Pulmonary/Chest: Effort normal and breath sounds normal. No stridor. No respiratory distress. She has no wheezes. She has no rales. She exhibits no tenderness. Abdominal: Soft. Bowel sounds are normal. She exhibits no distension. There is no tenderness. There is no rebound and no guarding. Musculoskeletal: Normal range of motion. She exhibits no edema or tenderness. Neurological: She is alert and oriented to person, place, and time. She displays normal reflexes. No cranial nerve deficit. She exhibits normal muscle tone.  Coordination normal.   5/5 strength throughoug although mild weakness in [place] compared to lue against resistance;  no past pointing; good heel to shin; negative romberg; holds both arms extended in front of them for 10 seconds without difficulty or drift; lifts legs off of bed without difficulty; no pronator drift; good equal  strength bilaterally; motor and sensory grossly intact; no facial asymmetry; sporadic involuntary mouth movements;      Skin: Skin is warm and dry. No rash noted. She is not diaphoretic. No erythema. Abrasion to right elbow. Psychiatric: She has a normal mood and affect. Her behavior is normal.   Nursing note and vitals reviewed. Diagnostic Study Results     Labs -     Recent Results (from the past 12 hour(s))   CBC WITH AUTOMATED DIFF    Collection Time: 08/01/18  7:48 PM   Result Value Ref Range    WBC 6.5 3.6 - 11.0 K/uL    RBC 4.85 3.80 - 5.20 M/uL    HGB 12.7 11.5 - 16.0 g/dL    HCT 41.3 35.0 - 47.0 %    MCV 85.2 80.0 - 99.0 FL    MCH 26.2 26.0 - 34.0 PG    MCHC 30.8 30.0 - 36.5 g/dL    RDW 14.4 11.5 - 14.5 %    PLATELET 913 347 - 700 K/uL    MPV 9.9 8.9 - 12.9 FL    NRBC 0.0 0  WBC    ABSOLUTE NRBC 0.00 0.00 - 0.01 K/uL    NEUTROPHILS 57 32 - 75 %    LYMPHOCYTES 34 12 - 49 %    MONOCYTES 8 5 - 13 %    EOSINOPHILS 1 0 - 7 %    BASOPHILS 1 0 - 1 %    IMMATURE GRANULOCYTES 0 0.0 - 0.5 %    ABS. NEUTROPHILS 3.7 1.8 - 8.0 K/UL    ABS. LYMPHOCYTES 2.2 0.8 - 3.5 K/UL    ABS. MONOCYTES 0.5 0.0 - 1.0 K/UL    ABS. EOSINOPHILS 0.0 0.0 - 0.4 K/UL    ABS. BASOPHILS 0.0 0.0 - 0.1 K/UL    ABS. IMM. GRANS. 0.0 0.00 - 0.04 K/UL    DF AUTOMATED     METABOLIC PANEL, COMPREHENSIVE    Collection Time: 08/01/18  7:48 PM   Result Value Ref Range    Sodium 141 136 - 145 mmol/L    Potassium 4.4 3.5 - 5.1 mmol/L    Chloride 112 (H) 97 - 108 mmol/L    CO2 26 21 - 32 mmol/L    Anion gap 3 (L) 5 - 15 mmol/L    Glucose 90 65 - 100 mg/dL    BUN 12 6 - 20 MG/DL    Creatinine 0.98 0.55 - 1.02 MG/DL    BUN/Creatinine ratio 12 12 - 20      GFR est AA >60 >60 ml/min/1.73m2    GFR est non-AA 59 (L) >60 ml/min/1.73m2    Calcium 8.9 8.5 - 10.1 MG/DL    Bilirubin, total 0.3 0.2 - 1.0 MG/DL    ALT (SGPT) 26 12 - 78 U/L    AST (SGOT) 21 15 - 37 U/L    Alk.  phosphatase 88 45 - 117 U/L    Protein, total 7.7 6.4 - 8.2 g/dL    Albumin 3.9 3.5 - 5.0 g/dL    Globulin 3.8 2.0 - 4.0 g/dL    A-G Ratio 1.0 (L) 1.1 - 2.2     MAGNESIUM    Collection Time: 08/01/18  7:48 PM   Result Value Ref Range    Magnesium 2.1 1.6 - 2.4 mg/dL   PHOSPHORUS    Collection Time: 08/01/18  7:48 PM   Result Value Ref Range    Phosphorus 3.1 2.6 - 4.7 MG/DL   EKG, 12 LEAD, INITIAL    Collection Time: 08/01/18  9:24 PM   Result Value Ref Range    Ventricular Rate 50 BPM    Atrial Rate 50 BPM    P-R Interval 142 ms    QRS Duration 74 ms    Q-T Interval 480 ms    QTC Calculation (Bezet) 437 ms    Calculated P Axis 37 degrees    Calculated R Axis 9 degrees    Calculated T Axis 33 degrees    Diagnosis       Sinus bradycardia  Septal infarct , age undetermined  When compared with ECG of 08-MAR-2017 14:31,  No significant change was found     TROPONIN I    Collection Time: 08/01/18 10:32 PM   Result Value Ref Range    Troponin-I, Qt. <0.05 <0.05 ng/mL       Radiologic Studies -   XR CHEST PORT   Final Result      CT HEAD WO CONT   Final Result      MRI BRAIN WO CONT    (Results Pending)   DUPLEX CAROTID BILATERAL    (Results Pending)     CT Results  (Last 48 hours)               08/01/18 2116  CT HEAD WO CONT Final result    Impression:  IMPRESSION: No acute intracranial hemorrhage, mass or infarct. Narrative:  INDICATION: Neuro deficit, acute single, stable or partly resolved. Worsening   right-sided weakness today. Exam: Noncontrast CT of the brain is performed with 5 mm collimation. CT dose reduction was achieved with the use of the standardized protocol   tailored for this examination and automatic exposure control for dose   modulation. Direct comparison is made to prior CT dated March 2017. FINDINGS: There are embolization coils in the expected location of the cavernous   portion of the right internal carotid artery.  There is no acute intracranial   hemorrhage, mass, mass effect or herniation. Ventricular system is normal. The   gray-white matter differentiation is well-preserved. The mastoid air cells are   well pneumatized. The visualized paranasal sinuses are normal.               CXR Results  (Last 48 hours)               08/01/18 2145  XR CHEST PORT Final result    Impression:  IMPRESSION:    Clear lungs. Narrative:  PORTABLE CHEST RADIOGRAPH/S: 8/1/2018 9:45 PM       INDICATION: Chest pain. HISTORY (per electronic medical record): No additional relevant information   available. COMPARISON: 3/11/2017. TECHNIQUE: Portable frontal upright radiograph/s of the chest.       FINDINGS:    The lungs are clear. Central airways are patent. No pneumothorax or pleural   effusion. Medical Decision Making   I am the first provider for this patient. I reviewed the vital signs, available nursing notes, past medical history, past surgical history, family history and social history. Vital Signs-Reviewed the patient's vital signs. Patient Vitals for the past 12 hrs:   Temp Pulse Resp BP SpO2   08/01/18 1814 98.2 °F (36.8 °C) 74 16 145/74 100 %       ED EKG interpretation: 21:24  Rhythm: sinus bradycardia; and regular . Rate (approx.): 50; Axis: normal; MT Interval: normal; QRS interval: normal ; ST/T wave: non-specific changes; This EKG was interpreted by Ya Finney MD,ED Provider. Records Reviewed: Old Medical Records    Provider Notes (Medical Decision Making):   DDx: dystonic reaction, CVA, [address]    ED Course:   Initial assessment performed. The patients presenting problems have been discussed, and they are in agreement with the care plan formulated and outlined with them. I have encouraged them to ask questions as they arise throughout their visit. Consult Note:  9:37 PM  Ya Finney MD spoke with Kolton Vasquez MD  Specialty: Hospitalist  Discussed pt's hx, disposition, and available diagnostic and imaging results.  Dr. Doy Baumgarten will admit pt for CVA workup. Will admit for subacute cva workup; no indication for TPA; minor strength  deficits on exam in rue compared to left; Carmen Almaguer MD        Disposition:  Admit Note:  9:55 PM  Pt is being admitted by Dr. Armen Bundy. The results of their tests and reason(s) for their admission have been discussed with pt and/or available family. They convey agreement and understanding for the need to be admitted and for admission diagnosis. Diagnosis     Clinical Impression:   1. Dystonic drug reaction    2. Weakness of right upper extremity        Attestations: This note is prepared by Soraida Menchaca, acting as a Scribe for MD Carmen Logan MD: The scribe's documentation has been prepared under my direction and personally reviewed by me in its entirety. I confirm that the notes above accurately reflects all work, treatment, procedures, and medical decision making performed by me.

## 2018-08-02 NOTE — PROGRESS NOTES
* No surgery found *  * No surgery found *  Bedside shift change report given to [id number] Froil[address] Narayanan (oncoming nurse) by Osmany Morales (offgoing nurse). Report included the following information [facility] Phone:   [phone]      Significant changes during shift:  NEw admission form Er at 1430. Carotid and echo done        Patient Information    Vincent Hudson  56 y.o.  8/1/2018  7:02 PM by Marty Scott MD. Vincent Hudson was admitted from Home    Problem List    Patient Active Problem List    Diagnosis Date Noted    Bilateral carotid artery stenosis 08/02/2018    TIA (transient ischemic attack) 08/01/2018    Recurrent depression ([facility] [address] 01/16/2018    Headache 03/11/2017     Past Medical History:   Diagnosis Date    Anxiety disorder     Depression     Headache     Spinal stenosis     Thyroid disease     hypothyroid         Core Measures:    CVA: Yes Yes  CHF:No No  PNA:No No    0}    Activity Status:    OOB to Chair No  Ambulated this shift Yes   Bed Rest No    Supplemental O2: (If Applicable)    NC No  NRB No  Venti-mask No  On  Liters/min      LINES AND DRAINS:      DVT prophylaxis:    DVT prophylaxis Med- Yes  DVT prophylaxis SCD or JOYCE- No     Wounds: (If Applicable)    Wounds- No    Location     Patient Safety:    Falls Score Total Score: 2  Safety Level_______  Bed Alarm On? No  Sitter? No    Plan for upcoming shift: MRI (PT has coil in brain placed 8 months ago) Patient and  state they have no cards about the coil. MRI stated to try and find out where patient had this done and maybe records could be sent but patient needs to understand risk of MRI with this in.         Discharge Plan: Yes home when stable    Active Consults:  IP CONSULT TO NEUROLOGY

## 2018-08-02 NOTE — ED NOTES
Assumed care of patient at this time. Pt states that she went to [facility] on Monday for twitching and right sided weakness but left because the wait was too long. Pt went to physical therapy this afternoon and was having more right sided weakness (Pt being treated for left sided weakness) and her PT consulted her MD who said she needed to come to the ER for a head CT.  Pt had an ane

## 2018-08-02 NOTE — PROGRESS NOTES
[facility]  Preliminary Report:  Carotid Duplex Scan    Right:  No plaque noted in the right carotid system. Right ICA velocities suggest 0% diameter reduction. Right vertebral artery flow is antegrade. Left:  No plaque noted in the left carotid system. Left ICA velocities suggest 0% diameter reduction. Left vertebral artery flow is antegrade. Final report to follow.

## 2018-08-02 NOTE — ED NOTES
Bedside shift change report given to Mike Shen (oncoming nurse) by Evin Killian RN (offgoing nurse). Report included the following information SBAR, ED Summary, MAR and Recent Results.

## 2018-08-03 ENCOUNTER — APPOINTMENT (OUTPATIENT)
Dept: MRI IMAGING | Age: 58
End: 2018-08-03
Attending: PSYCHIATRY & NEUROLOGY
Payer: COMMERCIAL

## 2018-08-03 PROBLEM — R00.1 SINUS BRADYCARDIA: Status: ACTIVE | Noted: 2018-08-03

## 2018-08-03 LAB
ANION GAP SERPL CALC-SCNC: 9 MMOL/L (ref 5–15)
BUN SERPL-MCNC: 10 MG/DL (ref 6–20)
BUN/CREAT SERPL: 11 (ref 12–20)
CALCIUM SERPL-MCNC: 8.8 MG/DL (ref 8.5–10.1)
CHLORIDE SERPL-SCNC: 111 MMOL/L (ref 97–108)
CO2 SERPL-SCNC: 23 MMOL/L (ref 21–32)
CREAT SERPL-MCNC: 0.9 MG/DL (ref 0.55–1.02)
GLUCOSE SERPL-MCNC: 90 MG/DL (ref 65–100)
MAGNESIUM SERPL-MCNC: 2.1 MG/DL (ref 1.6–2.4)
PHOSPHATE SERPL-MCNC: 4.2 MG/DL (ref 2.6–4.7)
POTASSIUM SERPL-SCNC: 3.8 MMOL/L (ref 3.5–5.1)
SODIUM SERPL-SCNC: 143 MMOL/L (ref 136–145)

## 2018-08-03 PROCEDURE — 99218 HC RM OBSERVATION: CPT

## 2018-08-03 PROCEDURE — 80048 BASIC METABOLIC PNL TOTAL CA: CPT

## 2018-08-03 PROCEDURE — G8989 SELF CARE D/C STATUS: HCPCS

## 2018-08-03 PROCEDURE — 83735 ASSAY OF MAGNESIUM: CPT

## 2018-08-03 PROCEDURE — G8988 SELF CARE GOAL STATUS: HCPCS

## 2018-08-03 PROCEDURE — 74011250636 HC RX REV CODE- 250/636: Performed by: EMERGENCY MEDICINE

## 2018-08-03 PROCEDURE — A9575 INJ GADOTERATE MEGLUMI 0.1ML: HCPCS | Performed by: EMERGENCY MEDICINE

## 2018-08-03 PROCEDURE — 84100 ASSAY OF PHOSPHORUS: CPT

## 2018-08-03 PROCEDURE — 70553 MRI BRAIN STEM W/O & W/DYE: CPT

## 2018-08-03 PROCEDURE — 96372 THER/PROPH/DIAG INJ SC/IM: CPT

## 2018-08-03 PROCEDURE — 74011250636 HC RX REV CODE- 250/636: Performed by: INTERNAL MEDICINE

## 2018-08-03 PROCEDURE — 74011250637 HC RX REV CODE- 250/637: Performed by: EMERGENCY MEDICINE

## 2018-08-03 PROCEDURE — G8987 SELF CARE CURRENT STATUS: HCPCS

## 2018-08-03 PROCEDURE — 97165 OT EVAL LOW COMPLEX 30 MIN: CPT

## 2018-08-03 PROCEDURE — 36415 COLL VENOUS BLD VENIPUNCTURE: CPT

## 2018-08-03 PROCEDURE — 74011250637 HC RX REV CODE- 250/637: Performed by: INTERNAL MEDICINE

## 2018-08-03 RX ORDER — HEPARIN SODIUM 5000 [USP'U]/ML
5000 INJECTION, SOLUTION INTRAVENOUS; SUBCUTANEOUS EVERY 8 HOURS
Status: DISCONTINUED | OUTPATIENT
Start: 2018-08-03 | End: 2018-08-04 | Stop reason: HOSPADM

## 2018-08-03 RX ORDER — GADOTERATE MEGLUMINE 376.9 MG/ML
15 INJECTION INTRAVENOUS
Status: COMPLETED | OUTPATIENT
Start: 2018-08-03 | End: 2018-08-03

## 2018-08-03 RX ADMIN — LEVOTHYROXINE SODIUM 75 MCG: 75 TABLET ORAL at 07:33

## 2018-08-03 RX ADMIN — Medication 10 ML: at 21:14

## 2018-08-03 RX ADMIN — Medication 10 ML: at 05:35

## 2018-08-03 RX ADMIN — CLOPIDOGREL BISULFATE 75 MG: 75 TABLET ORAL at 08:56

## 2018-08-03 RX ADMIN — ATORVASTATIN CALCIUM 40 MG: 40 TABLET, FILM COATED ORAL at 21:14

## 2018-08-03 RX ADMIN — TOPIRAMATE 100 MG: 100 TABLET, FILM COATED ORAL at 17:29

## 2018-08-03 RX ADMIN — TEMAZEPAM 15 MG: 15 CAPSULE ORAL at 23:31

## 2018-08-03 RX ADMIN — BUTALBITAL, ACETAMINOPHEN AND CAFFEINE 1 TABLET: 50; 325; 40 TABLET ORAL at 14:15

## 2018-08-03 RX ADMIN — BUTALBITAL, ACETAMINOPHEN AND CAFFEINE 1 TABLET: 50; 325; 40 TABLET ORAL at 21:14

## 2018-08-03 RX ADMIN — Medication 10 ML: at 14:15

## 2018-08-03 RX ADMIN — GADOTERATE MEGLUMINE 11 ML: 376.9 INJECTION INTRAVENOUS at 19:11

## 2018-08-03 RX ADMIN — HEPARIN SODIUM 5000 UNITS: 5000 INJECTION INTRAVENOUS; SUBCUTANEOUS at 07:33

## 2018-08-03 RX ADMIN — HEPARIN SODIUM 5000 UNITS: 5000 INJECTION INTRAVENOUS; SUBCUTANEOUS at 23:31

## 2018-08-03 RX ADMIN — FLUOXETINE 40 MG: 20 CAPSULE ORAL at 08:57

## 2018-08-03 RX ADMIN — TOPIRAMATE 100 MG: 100 TABLET, FILM COATED ORAL at 08:58

## 2018-08-03 RX ADMIN — HEPARIN SODIUM 5000 UNITS: 5000 INJECTION INTRAVENOUS; SUBCUTANEOUS at 15:37

## 2018-08-03 RX ADMIN — HEPARIN SODIUM 5000 UNITS: 5000 INJECTION INTRAVENOUS; SUBCUTANEOUS at 00:16

## 2018-08-03 NOTE — PROGRESS NOTES
Neurology Progress Note    Patient ID:  Vincent Hudson  [id number]  56 y.o.  1960    Subjective:      Patient continues to report issues with her right proximal arm and shoulder. Tingling and just does not feel right. Current Facility-Administered Medications   Medication Dose Route Frequency    heparin (porcine) injection 5,000 Units  5,000 Units SubCUTAneous Q8H    cyclobenzaprine (FLEXERIL) tablet 5 mg  5 mg Oral QHS PRN    FLUoxetine (PROzac) capsule 40 mg  40 mg Oral DAILY    methocarbamol (ROBAXIN) tablet 750 mg  750 mg Oral QID PRN    butalbital-acetaminophen-caffeine (FIORICET, ESGIC) -40 mg per tablet 1 Tab  1 Tab Oral Q6H PRN    acetaminophen (TYLENOL) tablet 325 mg  325 mg Oral Q4H PRN    busPIRone (BUSPAR) tablet 5 mg  5 mg Oral DAILY PRN    levothyroxine (SYNTHROID) tablet 75 mcg  75 mcg Oral ACB    temazepam (RESTORIL) capsule 15 mg  15 mg Oral QHS PRN    topiramate (TOPAMAX) tablet 100 mg  100 mg Oral BID    sodium chloride (NS) flush 5-10 mL  5-10 mL IntraVENous Q8H    sodium chloride (NS) flush 5-10 mL  5-10 mL IntraVENous PRN    clopidogrel (PLAVIX) tablet 75 mg  75 mg Oral DAILY    atorvastatin (LIPITOR) tablet 40 mg  40 mg Oral QHS        Review of Systems:    Pertinent items are noted in HPI.     Objective:     Patient Vitals for the past 8 hrs:   BP Temp Pulse Resp SpO2   08/03/18 1141 117/58 98.6 °F (37 °C) (!) 55 18 97 %   08/03/18 0740 116/63 98.6 °F (37 °C) (!) 56 18 95 %   08/03/18 0534 109/59 97.9 °F (36.6 °C) (!) 50 18 95 %               Lab Review   Recent Results (from the past 24 hour(s))   METABOLIC PANEL, BASIC    Collection Time: 08/03/18  3:38 AM   Result Value Ref Range    Sodium 143 136 - 145 mmol/L    Potassium 3.8 3.5 - 5.1 mmol/L    Chloride 111 (H) 97 - 108 mmol/L    CO2 23 21 - 32 mmol/L    Anion gap 9 5 - 15 mmol/L    Glucose 90 65 - 100 mg/dL    BUN 10 6 - 20 MG/DL    Creatinine 0.90 0.55 - 1.02 MG/DL    BUN/Creatinine ratio 11 (L) 12 - 20 GFR est AA >60 >60 ml/min/1.73m2    GFR est non-AA >60 >60 ml/min/1.73m2    Calcium 8.8 8.5 - 10.1 MG/DL   MAGNESIUM    Collection Time: 08/03/18  3:38 AM   Result Value Ref Range    Magnesium 2.1 1.6 - 2.4 mg/dL   PHOSPHORUS    Collection Time: 08/03/18  3:38 AM   Result Value Ref Range    Phosphorus 4.2 2.6 - 4.7 MG/DL     NEUROLOGICAL EXAM:     Appearance: The patient is thin, well nourished, provides a coherent history and is in no acute distress. Mental Status: Oriented to time, place and person. Fluent, no aphasia or dysarthria. Mood and affect appropriate. Cranial Nerves:   II - XII were intact. Motor:  5/5 strength in upper and lower proximal and distal muscles on individual muscle testing. Right upper extremity drift and then complains of right shoulder and deltoid discomfort. (+) tenderness on palpation right shoulder joint area. Normal bulk and tone. Reflexes:   Deep tendon reflexes 2+/4 and symmetrical. Toes downgoing. Sensory:   Normal to cold, pinprick and vibration. Gait:  Normal gait. Normal bilateral arm swing. Tremor:   No tremor noted. Cerebellar:  No cerebellar signs present.              Assessment:   Right proximal arm weakness    Plan:   Neurological examination does not reveal any actual right upper extremity muscle weakness. Only sense of weakness is seen with right UE drift but then complains of right shoulder and deltoid discomfort. Suspect issue is not central. However given same side of right hypophyseal aneurysm coiling, it would be prudent to assess for any new pathology.     Head CT was unremarkable. Spoke with neurosurgeon who treated her right hypophyseal aneurysm in 2017. Everything is MRI compatible. She in fact had a lumbar MRI done 2/2018 prior to back surgery.  Awaiting Brain MRI with and without contrast to rule out a subtle stroke.      LDL at goal. Carotid doppler and echocardiogram was unremarkable.      If MRI is okay, then okay for discharge from a neurological standpoint. Follow up with neurosurgery at [address] [place] [address]. If condition persists for 3 weeks, then recommend outpatient EMG/NCS.       Signed:  133 Kera Rodriguez MD  8/3/2018  12:00 PM

## 2018-08-03 NOTE — PROGRESS NOTES
Pt is a 56 y.o  female admitted with a TIA. Pt was alert, oriented resting in bed. Demographic information verified and all is correct. Pt lives with her  in a 2 story home with steps. Prior to admission, pt was independent with her ADL's and IADL's. Pt stated she only drives short distances. Pt denies using DME or having home health. Pt was going to [facility] outpt rehab for PT and SP services prior to admission. Pt goes 2x/week. Pt's  transports pt. Preferred pharmacy is [facility] [address]. CM will continue to follow pt for discharge planning needs. Reason for Admission:   TIA                   RRAT Score:       5              Plan for utilizing home health:      No, outpt rehab                    Likelihood of Readmission:   low                         Transition of Care Plan:        Home with outpt rehab & f/u appts                Care Management Interventions  PCP Verified by CM: Yes (Dr. Vesna Schafer)  Mode of Transport at Discharge:  Other (see comment)  Transition of Care Consult (CM Consult): Discharge Planning  Discharge Durable Medical Equipment: No  Physical Therapy Consult: Yes  Occupational Therapy Consult: Yes  Speech Therapy Consult: Yes  Current Support Network: Lives with Spouse, Own Home (lives with  in a 2 story home with 3 steps to the entrance)  Confirm Follow Up Transport: Family  Discharge Location  Discharge Placement: Home with outpatient services    [address] [facility]

## 2018-08-03 NOTE — CONSULTS
[address], [address] [facility]     Date of  Admission: 8/1/2018  7:02 PM     Admission type:Emergency    Consult for: bradycardia   Consult by: hospitalist      Subjective:     Norah Keating is a 56 y.o. femalewith PMH HA, hypothyroid, anxiety who was admitted for TIA (transient ischemic attack)  TIA (transient ischemic attack). Ms. Duane Boatman is being treated for suspected TIA. Cardiology was consulted for bradycardia. Nursing notes indicate that Ms. Duane Boatman had bradycardia down to 20s and was asymptomatic. Tele review does not show any bradycardia lower than the high 30s. Patient and her  state the patient's resting HR is in the 40s at baseline. On assessment, Ms Duane Boatman states she is feeling well. Her weakness is improving. She denies chest pain, SOB, palpitations, leg swelling, orthopnea, dizziness/lightheadedness. She endorses chronic headaches. Ms. Duane Boatman does not follow with a cardiologist.  ECHO completed yesterday shows an EF of 65%; NWMA.         Cardiac risk factors: post-menopausal.      Patient Active Problem List    Diagnosis Date Noted    Sinus bradycardia 08/03/2018    Bilateral carotid artery stenosis 08/02/2018    TIA (transient ischemic attack) 08/01/2018    Recurrent depression ([address] 01/16/2018    Headache 03/11/2017      Demetrio Quispe MD  Past Medical History:   Diagnosis Date    Anxiety disorder     Depression     Headache     Spinal stenosis     Thyroid disease     hypothyroid      Social History     Social History    Marital status:      Spouse name: N/A    Number of children: N/A    Years of education: N/A     Social History Main Topics    Smoking status: Never Smoker    Smokeless tobacco: Never Used    Alcohol use 1.2 oz/week     2 Glasses of wine per week      Comment: socially    Drug use: No    Sexual activity: Not Asked     Other Topics Concern    None     Social History Narrative     No Known Allergies   History reviewed. No pertinent family history.    Current Facility-Administered Medications   Medication Dose Route Frequency    heparin (porcine) injection 5,000 Units  5,000 Units SubCUTAneous Q8H    cyclobenzaprine (FLEXERIL) tablet 5 mg  5 mg Oral QHS PRN    FLUoxetine (PROzac) capsule 40 mg  40 mg Oral DAILY    methocarbamol (ROBAXIN) tablet 750 mg  750 mg Oral QID PRN    butalbital-acetaminophen-caffeine (FIORICET, ESGIC) -40 mg per tablet 1 Tab  1 Tab Oral Q6H PRN    acetaminophen (TYLENOL) tablet 325 mg  325 mg Oral Q4H PRN    busPIRone (BUSPAR) tablet 5 mg  5 mg Oral DAILY PRN    levothyroxine (SYNTHROID) tablet 75 mcg  75 mcg Oral ACB    temazepam (RESTORIL) capsule 15 mg  15 mg Oral QHS PRN    topiramate (TOPAMAX) tablet 100 mg  100 mg Oral BID    sodium chloride (NS) flush 5-10 mL  5-10 mL IntraVENous Q8H    sodium chloride (NS) flush 5-10 mL  5-10 mL IntraVENous PRN    clopidogrel (PLAVIX) tablet 75 mg  75 mg Oral DAILY    atorvastatin (LIPITOR) tablet 40 mg  40 mg Oral QHS        Review of Symptoms:   11 systems reviewed, negative other than as stated in the HPI        Objective:      Visit Vitals    /60    Pulse (!) 50    Temp 98.3 °F (36.8 °C)    Resp 18    Ht 4' 11\" (1.499 m)    Wt 55.5 kg (122 lb 5.7 oz)    SpO2 95%    Breastfeeding No    BMI 24.71 kg/m2       Physical:   General: pleasant  female resting in bed in NAD  Heart: RRR, quiet heart sounds   Lungs: clear   Abdomen: Soft, +BS, NTND   Extremities: LE angela +DP/PT, no edema   Neurologic: Grossly normal    Data Review:   Recent Labs      08/02/18 0317 08/01/18 1948   WBC  5.4  6.5   HGB  12.3  12.7   HCT  38.7  41.3   PLT  281  318     Recent Labs      08/03/18   0338  08/02/18 0317 08/01/18 1948   NA  143  142  141   K  3.8  3.5  4.4   CL  111*  112*  112*   CO2  23  23  26   GLU  90  87  90   BUN  10  9  12   CREA  0.90  0.93  0.98 CA  8.8  8.7  8.9   MG  2.1   --   2.1   PHOS  4.2   --   3.1   ALB   --    --   3.9   TBILI   --    --   0.3   SGOT   --    --   21   ALT   --    --   26       Recent Labs      08/02/18   0317  08/01/18   2232   Jeremy Sport  <0.05  <0.05       No intake or output data in the 24 hours ending 08/03/18 1712     Cardiographics    Telemetry: SB 40s  ECG: SB  Echocardiogram: EF 65%; NWMA   CXRAY: no acute process        Assessment:       Active Problems:    TIA (transient ischemic attack) (8/1/2018)      Bilateral carotid artery stenosis (8/2/2018)      Sinus bradycardia (8/3/2018)         Plan:     Annette Dupree is a 56 y.o. female who is admitted for suspected TIA. Cardiology was consulted for bradycardia. Tele review shows HRs down into the high 30s at times. No pauses. Patient and her  state her resting HR is in the 40s. Troponin negative.  K 3.8. TSH 1.84. EF 65%  · Did not see bradycardia lower than high 30s on telemetry. No pauses. May have been artifact that RN saw this morning? · TSH is good. Continue with current synthroid dose. · Patient not on rate lowering medications. · ECHO shows EF of 65% and no new concerns      Thank you for consulting [facility]. No further cardiac work up needed. Please call if patient develops pauses, symptomatic bradycardia, or further concerns for HRs lower than identified today. Arturo Bhardwaj NP  DNP, RN, AGAP-BC      Pt seen and examined in details. Agree with NP A&P. No significant bradycardia noted. D/w pt and family.      Analisa Fernandez MD

## 2018-08-03 NOTE — PROGRESS NOTES
* No surgery found *  * No surgery found *  Bedside shift change report given to [facility] TIMOTHY TERRAZAS RN(oncoming nurse) by Kristie Urias RN (offgoing nurse). Report included the following information [facility] Phone:   [phone]      Significant changes during shift:  X rays of shoulder done was negative    Patient Information    Saravanan Quinteros  56 y.o.  8/1/2018  7:02 PM by Rory Vargas MD. Saravanan Quinteros was admitted from Home    Problem List    Patient Active Problem List    Diagnosis Date Noted    Bilateral carotid artery stenosis 08/02/2018    TIA (transient ischemic attack) 08/01/2018    Recurrent depression ([facility] [address] 01/16/2018    Headache 03/11/2017     Past Medical History:   Diagnosis Date    Anxiety disorder     Depression     Headache     Spinal stenosis     Thyroid disease     hypothyroid         Core Measures:    CVA: Yes Yes  CHF:No No  PNA:No No    0}    Activity Status:    OOB to Chair No  Ambulated this shift Yes   Bed Rest No    Supplemental O2: (If Applicable)    NC No  NRB No  Venti-mask No  On  Liters/min      LINES AND DRAINS:      DVT prophylaxis:    DVT prophylaxis Med- Yes  DVT prophylaxis SCD or JOYCE- No     Wounds: (If Applicable)    Wounds- No    Location     Patient Safety:    Falls Score Total Score: 2  Safety Level_______  Bed Alarm On? No  Sitter? No    Plan for upcoming shift: neurochecks, MRI (PT has coil in brain placed 8 months ago) Patient and  state they have no cards about the coil. MRI stated to try and find out where patient had this done and maybe records could be sent but patient needs to understand risk of MRI with this in.         Discharge Plan: Yes home when stable    Active Consults:  IP CONSULT TO NEUROLOGY

## 2018-08-03 NOTE — PROGRESS NOTES
Occupational Therapy neurological EVALUATION with discharge  Patient: Wilfredo Virk (59 y.o. female)  Date: 8/3/2018  Primary Diagnosis: TIA (transient ischemic attack)  TIA (transient ischemic attack)        Precautions:        ASSESSMENT:  Cleared by RN to see pt for therapy session. Based on the objective data described below, the patient presents at baseline level of function following admission for TIA. Pt lives with her , previously I with ADLs and mobility, PMH significant for aneursym. [facility] [address] [facility] to facial twitching and R sided weakness, CT negative, MRI pending. Today pt received supine in bed, performed supine>sit and stood to setup breakfast tray with independence. LB dressing and self-feeding including container management performed with independence. Reports no visual symptoms, no deficits noted in acuity or tracking. No deficits in UE ROM or strength noted, pt only reported mild tingling of R hand. Pt sitting up in bed at end of session eating breakfast, call bell in reach and needs met. She is performing ADLs and transfers at a mod I-independent level, reports she is at her baseline and denies need for OT services. Further skilled acute occupational therapy is not indicated at this time. Discharge Recommendations: None  Further Equipment Recommendations for Discharge: none     SUBJECTIVE:   Patient stated I don't really need help with anything.     OBJECTIVE DATA SUMMARY:   HISTORY:   Past Medical History:   Diagnosis Date    Anxiety disorder     Depression     Headache     Spinal stenosis     Thyroid disease     hypothyroid     Past Surgical History:   Procedure Laterality Date    HX HEENT      HX LUMBAR FUSION  03/16/2018    HX ORTHOPAEDIC  2002    ACL repair, pt unsure which side    HX ORTHOPAEDIC  2013    left shoulder surgery    CT COLONOSCOPY FLX DX W/COLLJ SPEC WHEN PFRMD  5/6/2013            Prior Level of Function/Environment/Context:  Pt lives with her , previously I with ADLs and mobility, PMH significant for aneursym. Home Situation  Home Environment: Private residence  # Steps to Enter: 3  Rails to Enter: Yes  Hand Rails : Bilateral  One/Two Story Residence: Two story  # of Interior Steps: 15  Interior Rails: Both  Living Alone: No (lives with )  Support Systems: Family member(s), Spouse/Significant Other/Partner  Patient Expects to be Discharged to[de-identified] Private residence  Current DME Used/Available at Home: None  Tub or Shower Type: Tub/Shower combination    Hand dominance: Right    EXAMINATION OF PERFORMANCE DEFICITS:  Cognitive/Behavioral Status:  Neurologic State: Alert  Orientation Level: Oriented X4  Cognition: Follows commands  Perception: Appears intact  Perseveration: No perseveration noted  Safety/Judgement: Awareness of environment; Fall prevention    Hearing: Auditory  Auditory Impairment: None    Vision/Perceptual:            Acuity: Within Defined Limits         Range of Motion:  AROM: Within functional limits  PROM: Within functional limits        Strength:  Strength: Within functional limits     Coordination:  Coordination: Within functional limits  Fine Motor Skills-Upper: Left Intact; Right Intact    Gross Motor Skills-Upper: Left Intact; Right Intact    Tone & Sensation:  Tone: Normal  Sensation: Impaired (mild tingling R hand)        Balance:  Sitting: Intact  Standing: Intact    Functional Mobility and Transfers for ADLs:  Bed Mobility:  Supine to Sit: Independent  Sit to Supine: Independent    Transfers:  Sit to Stand: Independent           ADL Assessment:  Feeding: Independent    Oral Facial Hygiene/Grooming: Independent    Bathing: Modified independent    Upper Body Dressing: Independent    Lower Body Dressing: Modified independent    Toileting: Independent       ADL Intervention and task modifications:  Feeding  Feeding Assistance: Independent    Lower Body Dressing Assistance  Socks:  Independent         Cognitive Retraining  Safety/Judgement: Awareness of environment; Fall prevention    Functional Measure:   Fugl-Main Assessment of Motor Recovery after Stroke:     Reflex Activity  Flexors/Biceps/Fingers: Can be elicited  Extensors/Triceps: Can be elicited  Reflex Subtotal: 4    Volitional Movement Within Synergies  Shoulder Retraction: Full  Shoulder Elevation: Full  Shoulder Abduction (90 degrees): Full  Shoulder External Rotation: Full  Elbow Flexion: Full  Forearm Supination: Full  Shoulder Adduction/Internal Rotation: Full  Elbow Extension: Full  Forearm Pronation: Full  Subtotal: 18    Volitional Movement Mixing Synergies  Hand to Lumbar Spine: Full  Shoulder Flexion (0-90 degrees): Full  Pronation-Supination: Full  Subtotal: 6    Volitional Movement With Little or No Synergy  Shoulder Abduction (0-90 degrees): Full  Shoulder Flexion ( degrees): Full  Pronation/Supination: Full  Subtotal : 6    Normal Reflex Activity  Biceps, Triceps, Finger Flexors: Full  Subtotal : 2    Upper Extremity Total   Upper Extremity Total: 36    Wrist  Stability at 15 Degree Dorsiflexion: Full  Repeated Dorsiflexion/ Volar Flexion: Full  Stability at 15 Degree Dorsiflexion: Full  Repeated Dorsiflexion/ Volar Flexion: Full  Circumduction: Full  Wrist Total: 10    Hand  Mass Flexion: Full  Mass Extension: Full  Grasp A: Full  Grasp B: Full  Grasp C: Full  Grasp D: Full  Grasp E: Full  Hand Total: 14    Coordination/Speed  Tremor: None  Dysmetria: None  Time: <1s  Coordination/Speed Total : 6    Total A-D  Total A-D (Motor Function): 66/66     Percentage of impairment CH  0% CI  1-19% CJ  20-39% CK  40-59% CL  60-79% CM  80-99% CN  100%   Fugl-Main score: 0-66 66 53-65 39-52 26-38 13-25 1-12   0      This is a reliable/valid measure of arm function after a neurological event. It has established value to characterize functional status and for measuring spontaneous and therapy-induced recovery; tests proximal and distal motor functions. Fugl-Main Assessment - UE scores recorded between five and 30 days post neurologic event can be used to predict UE recovery at six months post neurologic event. Severe = 0-21 points   Moderately Severe = 22-33 points   Moderate = 34-47 points   Mild = 48-66 points  CHIKA Underwood, MAYNOR You, & PIERRE Cintron (1992). Measurement of motor recovery after stroke: Outcome assessment and sample size requirements. Stroke, 23, pp. 7330-1153.   ------------------------------------------------------------------------------------------------------------------------------------------------------------------  MCID:  Stroke:   Tammie Hudson et al, 2001; n = 171; mean age 79 (5) years; assessed within 16 (12) days of stroke, Acute Stroke)  FMA Motor Scores from Admission to Discharge   10 point increase in FMA Upper Extremity = 1.5 change in discharge FIM   10 point increase in FMA Lower Extremity = 1.9 change in discharge FIM  MDC:   Stroke:   Lamonte Dyson et al, 2008, n = 14, mean age = 59.9 (14.6) years, assessed on average 14 (6.5) months post stroke, Chronic Stroke)   FMA = 5.2 points for the Upper Extremity portion of the assessment     Barthel Index:    Bathin  Bladder: 10  Bowels: 10  Groomin  Dressing: 10  Feeding: 10  Mobility: 15  Stairs: 5  Toilet Use: 10  Transfer (Bed to Chair and Back): 15  Total: 95       Barthel and G-code impairment scale:  Percentage of impairment CH  0% CI  1-19% CJ  20-39% CK  40-59% CL  60-79% CM  80-99% CN  100%   Barthel Score 0-100 100 99-80 79-60 59-40 20-39 1-19   0   Barthel Score 0-20 20 17-19 13-16 9-12 5-8 1-4 0      The Barthel ADL Index: Guidelines  1. The index should be used as a record of what a patient does, not as a record of what a patient could do. 2. The main aim is to establish degree of independence from any help, physical or verbal, however minor and for whatever reason. 3. The need for supervision renders the patient not independent.   4. A patient's performance should be established using the best available evidence. Asking the patient, friends/relatives and nurses are the usual sources, but direct observation and common sense are also important. However direct testing is not needed. 5. Usually the patient's performance over the preceding 24-48 hours is important, but occasionally longer periods will be relevant. 6. Middle categories imply that the patient supplies over 50 per cent of the effort. 7. Use of aids to be independent is allowed. Phillip Bhatia., Barthel, D.W. (5624). Functional evaluation: the Barthel Index. [address] [facility] (14)2. BAM Knox, Oneil Irby., Tara Zamora., [place], [address] (1999). Measuring the change indisability after inpatient rehabilitation; comparison of the responsiveness of the Barthel Index and Functional Patrick Measure. Journal of Neurology, Neurosurgery, and Psychiatry, 66(4), 683-660. Miguelangel Maciel, N.J.A, ALIYAH DamianJ.MIRIAM, & Earnest Medley M.A. (2004.) Assessment of post-stroke quality of life in cost-effectiveness studies: The usefulness of the Barthel Index and the EuroQoL-5D. Quality of Life Research, 13, 378-45     G codes: In compliance with CMSs Claims Based Outcome Reporting, the following G-code set was chosen for this patient based on their primary functional limitation being treated: The outcome measure chosen to determine the severity of the functional limitation was the Barthel Index with a score of 95/100 which was correlated with the impairment scale. ?  Self Care:     - CURRENT STATUS: CI - 1%-19% impaired, limited or restricted    - GOAL STATUS: CI - 1%-19% impaired, limited or restricted    - D/C STATUS:  CI - 1%-19% impaired, limited or restricted      Occupational Therapy Evaluation Charge Determination   History Examination Decision-Making   LOW Complexity : Brief history review  LOW Complexity : 1-3 performance deficits relating to physical, cognitive , or psychosocial skils that result in activity limitations and / or participation restrictions  LOW Complexity : No comorbidities that affect functional and no verbal or physical assistance needed to complete eval tasks       Based on the above components, the patient evaluation is determined to be of the following complexity level: LOW     Pain:  Pain Scale 1: Numeric (0 - 10)  Pain Intensity 1: 0              Activity Tolerance:   Good  Please refer to the flowsheet for vital signs taken during this treatment. After treatment:   []  Patient left in no apparent distress sitting up in chair  [x]  Patient left in no apparent distress in bed  [x]  Call bell left within reach  [x]  Nursing notified  []  Caregiver present  []  Bed alarm activated    COMMUNICATION/EDUCATION:   Findings and recommendations were discussed with: Registered Nurse    Patient was educated regarding Her deficit(s) of R sided weakness as this relates to Her diagnosis of TIA. She demonstrated Good understanding as evidenced by teachback. Patient and/or family was verbally educated on the BE FAST acronym for signs/symptoms of CVA and TIA. BE FAST was written on patient's communication board  for visual education and reinforcement. All questions answered with patient indicating good understanding. [x]      Home safety education was provided and the patient/caregiver indicated understanding. [x]      Patient/family have participated as able and agree with findings and recommendations. []      Patient is unable to participate in plan of care at this time.     Thank you for this referral.  Fredia Hodgkins, OT  Time Calculation: 15 mins

## 2018-08-03 NOTE — PROGRESS NOTES
* No surgery found *  * No surgery found *  Bedside shift change report given to Jairo RN(oncoming nurse) by Pool Coy RN (offgoing nurse). Report included the following information [facility]     [facility] Phone:   [phone]        Significant changes during shift:  none     Patient Information     Pham Vega  56 y.o.  8/1/2018  7:02 PM by Trevor Nelson MD. hPam Vega was admitted from Home     Problem List          Patient Active Problem List     Diagnosis Date Noted    Bilateral carotid artery stenosis 08/02/2018    TIA (transient ischemic attack) 08/01/2018    Recurrent depression ([facility] [address] 01/16/2018    Headache 03/11/2017           Past Medical History:   Diagnosis Date    Anxiety disorder      Depression      Headache      Spinal stenosis      Thyroid disease       hypothyroid            Core Measures:     CVA: Yes Yes  CHF:No No  PNA:No No     0}     Activity Status:     OOB to Chair No  Ambulated this shift Yes   Bed Rest No     Supplemental O2: (If Applicable)     NC No  NRB No  Venti-mask No  On  Liters/min        LINES AND DRAINS:        DVT prophylaxis:     DVT prophylaxis Med- Yes  DVT prophylaxis SCD or JOYCE- No      Wounds: (If Applicable)     Wounds- No     Location      Patient Safety:     Falls Score Total Score: 2  Safety Level_______  Bed Alarm On? No  Sitter?  No     Plan for upcoming shift: faxed request to [facility] to inquire information about the stent of pt's brain , I spoke to staff as well           Discharge Plan: Yes home when stable     Active Consults:  IP CONSULT TO NEUROLOGY

## 2018-08-03 NOTE — PROGRESS NOTES
Hospitalist Progress Note    NAME: Kory Smalls   :  1960   MRN:  [id number]       Assessment / Plan:    Suspected transient ischemic attack  Resolving acute dystonic reaction from  [address]  She reports having new symptoms which are quite different from twitching movement she had about 10 days ago  Exam currently is nonfocal  Initiate complete workup for TIA   MRI of the brain-p, delay bc unclear if coil was mri compatible, verified ok and MRI still-p to decide on dc.  ultrasound carotids and 2D echocardiogram- normal   hemoglobin A1c 6.4, TSH normal and fasting lipid profile, LDL 72  She is on aspirin at home which will be held and will change to Plavix 75 mg daily.  Start Lipitor 40 mg daily. Consult PT OT and speech therapy. apprecaite neurology eval.  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 Recommended Disposition: Home w/Family PT recs for op tx referral at [place]     Subjective:     Chief Complaint / Reason for Physician Visit  R sided weakness     Patient reports she has developed migraine she has been having this at home off-and-on and is on Topamax which she had this morning. She says Fioricet helps as well.  She thinks that her right-sided weakness in her right side of her face arm and leg has improved. She denies any other symptoms no visual changes. She has not had her MRI. 8/3 Patient feels that her pins and needles in her face and arm and leg on the right are still there but improved her weakness in her right arm has improved and she would like to try to go home. However, she has been unable to get the MRI due to the coil in place and it is not clear if it is MRI safe. She has no other complaints. No shortness of breath or cough no chest pain her headache is better. Had brief episode of asym bradycardia in the 20s per the nurse (after I had seen her this am) and cards was consulted. Patient was evaluated at 7:45 AM and again at 6:30pm- MRI still not done. Discussed with RN events overnight. Review of Systems:  Symptom Y/N Comments  Symptom Y/N Comments   Fever/Chills    Chest Pain n    Poor Appetite    Edema     Cough n   Abdominal Pain n    Sputum n   Joint Pain     SOB/MANN n   Pruritis/Rash     Nausea/vomit n   Tolerating PT/OT     Diarrhea    Tolerating Diet     Constipation    Other       Could NOT obtain due to:      Objective:     VITALS:   Last 24hrs VS reviewed since prior progress note. Most recent are:  Patient Vitals for the past 24 hrs:   Temp Pulse Resp BP SpO2   08/03/18 1529 98.3 °F (36.8 °C) (!) 50 18 115/60 95 %   08/03/18 1141 98.6 °F (37 °C) (!) 55 18 117/58 97 %   08/03/18 0740 98.6 °F (37 °C) (!) 56 18 116/63 95 %   08/03/18 0534 97.9 °F (36.6 °C) (!) 50 18 109/59 95 %   08/03/18 0020 97.8 °F (36.6 °C) 69 18 95/53 97 %   08/02/18 1943 97.8 °F (36.6 °C) 67 18 113/77 98 %     No intake or output data in the 24 hours ending 08/03/18 1838     PHYSICAL EXAM:  Patient is awake alert seems more comfortable today, she is oriented ×3 on room air nontoxic-appearing.  Extraocular movements are intact sclera nonicteric conjunctiva are pink no facial asymmetries she has no pronator drif,t sensation grossly intact lower extremity strength appears symmetric Cardiovascular regular rate and obvious murmurs rubs or gallops lungs are clear without wheezes rhonchi or crackles abdomen benign bowel sounds present soft nontender extremities no clubbing cyanosis or edema    Reviewed most current lab test results and cultures  YES  Reviewed most current radiology test results   YES  Review and summation of old records today    NO  Reviewed patient's current orders and MAR    YES  PMH/SH reviewed - no change comparedy to H&P  ________________________________________________________________________  Care Plan discussed with:    Comments   Patient y    Family  y    RN y    Care Manager y    Consultant                        Multidiciplinary team rounds were held today with , nursing, pharmacist and clinical coordinator. Patient's plan of care was discussed; medications were reviewed and discharge planning was addressed. ________________________________________________________________________  Total NON critical care TIME:   Minutes    Total CRITICAL CARE TIME Spent:   Minutes non procedure based      Comments   >50% of visit spent in counseling and coordination of care     ________________________________________________________________________  Denis Arnett MD     Procedures: see electronic medical records for all procedures/Xrays and details which were not copied into this note but were reviewed prior to creation of Plan. LABS:  I reviewed today's most current labs and imaging studies.   Pertinent labs include:  Recent Labs      08/02/18 0317 08/01/18 1948   WBC  5.4  6.5   HGB  12.3  12.7   HCT  38.7  41.3   PLT  281  318     Recent Labs      08/03/18 0338 08/02/18 0317 08/01/18 1948   NA  143  142  141   K  3.8  3.5  4.4   CL  111*  112*  112*   CO2  23  23  26   GLU  90  87  90   BUN  10  9  12   CREA  0.90  0.93  0.98   CA  8.8  8.7  8.9   MG  2.1   --   2.1   PHOS  4.2   --   3.1   ALB   --    -- 3. 9   TBILI   --    --   0.3   SGOT   --    --   21   ALT   --    --   26       Signed: Kimber Connelly MD

## 2018-08-03 NOTE — PROGRESS NOTES
Spiritual Care Partner Volunteer visited patient in Neuro on 8/3/18. Documented by:  Lona Valera M.Div.    Paging Service [phone])

## 2018-08-03 NOTE — PROGRESS NOTES
Bedside and Verbal shift change report received from [facility]. Report included the following information SBAR, Kardex and Recent Results.

## 2018-08-03 NOTE — PROGRESS NOTES
Bedside and Verbal shift change report given to [address] (oncoming nurse) by Chen Garcia (offgoing nurse). Report included the following information SBAR, Kardex and Recent Results.

## 2018-08-04 VITALS
OXYGEN SATURATION: 100 % | HEIGHT: 59 IN | WEIGHT: 122.36 LBS | HEART RATE: 43 BPM | DIASTOLIC BLOOD PRESSURE: 64 MMHG | TEMPERATURE: 97.5 F | RESPIRATION RATE: 18 BRPM | SYSTOLIC BLOOD PRESSURE: 128 MMHG | BODY MASS INDEX: 24.67 KG/M2

## 2018-08-04 PROCEDURE — 74011250637 HC RX REV CODE- 250/637: Performed by: INTERNAL MEDICINE

## 2018-08-04 PROCEDURE — 99218 HC RM OBSERVATION: CPT

## 2018-08-04 PROCEDURE — 74011250637 HC RX REV CODE- 250/637: Performed by: EMERGENCY MEDICINE

## 2018-08-04 RX ADMIN — Medication 10 ML: at 06:52

## 2018-08-04 RX ADMIN — LEVOTHYROXINE SODIUM 75 MCG: 75 TABLET ORAL at 06:55

## 2018-08-04 RX ADMIN — FLUOXETINE 40 MG: 20 CAPSULE ORAL at 08:32

## 2018-08-04 RX ADMIN — CLOPIDOGREL BISULFATE 75 MG: 75 TABLET ORAL at 08:32

## 2018-08-04 RX ADMIN — TOPIRAMATE 100 MG: 100 TABLET, FILM COATED ORAL at 08:32

## 2018-08-04 NOTE — DISCHARGE INSTRUCTIONS
[employer] Activation    Thank you for requesting access to [employer]. Please follow the instructions below to securely access and download your online medical record. [employer] allows you to send messages to your doctor, view your test results, renew your prescriptions, schedule appointments, and more. How Do I Sign Up? 1. In your internet browser, go to [email or web address]  2. Click on the First Time User? Click Here link in the Sign In box. You will be redirect to the New Member Sign Up page. 3. Enter your [employer] Access Code exactly as it appears below. You will not need to use this code after youve completed the sign-up process. If you do not sign up before the expiration date, you must request a new code. [employer] Access Code: Activation code not generated  Current [employer] Status: Active (This is the date your [employer] access code will )    4. Enter the last four digits of your Social Security Number (xxxx) and Date of Birth (mm/dd/yyyy) as indicated and click Submit. You will be taken to the next sign-up page. 5. Create a [employer] ID. This will be your [employer] login ID and cannot be changed, so think of one that is secure and easy to remember. 6. Create a [employer] password. You can change your password at any time. 7. Enter your Password Reset Question and Answer. This can be used at a later time if you forget your password. 8. Enter your e-mail address. You will receive e-mail notification when new information is available in [address] 9. Click Sign Up. You can now view and download portions of your medical record. 10. Click the Download Summary menu link to download a portable copy of your medical information. Additional Information    If you have questions, please visit the Frequently Asked Questions section of the [employer] website at [email or web address] Remember, [employer] is NOT to be used for urgent needs. For medical emergencies, dial 911. HOSPITALIST DISCHARGE INSTRUCTIONS    NAME: Ponce Espinal   :  1960   MRN:  [id number]     Date/Time:  2018 8:44 AM    ADMIT DATE: 2018   DISCHARGE DATE: 2018         · It is important that you take the medication exactly as they are prescribed. · Keep your medication in the bottles provided by the pharmacist and keep a list of the medication names, dosages, and times to be taken in your wallet. · Do not take other medications without consulting your doctor. What to do at Home    Recommended diet:  Cardiac Diet    Recommended activity: Activity as tolerated, cont outpt PT      If you have questions regarding the hospital related prescriptions or hospital related issues please call [facility] at [phone]. You can always direct your questions to your primary care doctor if you are unable to reach your hospital physician; your PCP works as an extension of your hospital doctor just like your hospital doctor is an extension of your PCP for your time at the hospital [facility], [facility])    If you experience any of the following symptoms then please call your primary care physician or return to the emergency room if you cannot get hold of your doctor:    Fever, chills, nausea, vomiting, or persistent diarrhea  Worsening weakness or new problems with your speech or balance  Dark stools or visible blood in your stools  New Leg swelling or shortness of breath as these could be signs of a clot    Additional Instructions:    F/u with pcp in 1 week and neurology as scheduled next week 8/15, f/u asap with your u neurosurgeon    Bring these papers with you to your follow up appointments. The papers will help your doctors be sure to continue the care plan from the hospital.              Information obtained by :  I understand that if any problems occur once I am at home I am to contact my physician. I understand and acknowledge receipt of the instructions indicated above. Physician's or R.N.'s Signature                                                                  Date/Time                                                                                                                                              Patient or Representative Signature

## 2018-08-04 NOTE — PROGRESS NOTES
Weekend CM completed chart review and noted that DC order is in. Pt will resume [facility] for PT/Speech therapy. No further CM needs. Care Management Interventions  PCP Verified by CM: Yes (Dr. Alexis Larkin)  Mode of Transport at Discharge:  Other (see comment)  Transition of Care Consult (CM Consult): Discharge Planning  Discharge Durable Medical Equipment: No  Physical Therapy Consult: Yes  Occupational Therapy Consult: Yes  Speech Therapy Consult: Yes  Current Support Network: Lives with Spouse, Own Home (lives with  in a 2 story home with 3 steps to the entrance)  Confirm Follow Up Transport: Family  Discharge Location  Discharge Placement: Home with outpatient services    Rahul Sevilla

## 2018-08-04 NOTE — ROUTINE PROCESS
* No surgery found *  * No surgery found *  Bedside/verbal shift change report given to Pool Coy RN(oncoming nurse) by HECTOR GOMES RN (offgoing nurse). Report included the following information AR.      Zone Phone:   [phone]          Significant changes during shift:  patient says tingling in right side face/right arm & hand is decreased but still there      Patient Information      Benny Candelaria  56 y.o.  8/1/2018  7:02 PM by Bart Bamberger, MD. Katie Dyer admitted from Home      Problem List              Patient Active Problem List      Diagnosis Date Noted    Bilateral carotid artery stenosis 08/02/2018    TIA (transient ischemic attack) 08/01/2018    Recurrent depression ([address] 01/16/2018    Headache 03/11/2017               Past Medical History:   Diagnosis Date    Anxiety disorder       Depression       Headache       Spinal stenosis       Thyroid disease         hypothyroid               Core Measures:      CVA: Yes Yes  CHF:No No  PNA:No No      0}      Activity Status:      OOB to Chair No  Ambulated this shift Yes   Bed Rest No      Supplemental N9: (XD Applicable)      NC No  NRB No  Venti-mask No  On  room air Liters/min          LINES AND DRAINS:          DVT prophylaxis:      DVT prophylaxis Med- Yes  DVT prophylaxis SCD or JOYCE- No       Wounds: (If Applicable)      Wounds- No      Location       Patient Safety:      Falls Score Total Score: 2  Safety Level_______  Bed Alarm On? No  Sitter?  No      Plan for upcoming shift: Neurochecks,            Discharge Plan: Yes possible   Active Consults:  IP CONSULT TO NEUROLOGY

## 2018-08-04 NOTE — DISCHARGE SUMMARY
Hospitalist Discharge Summary     Patient ID:  Saravanan Quinteros  [id number]  56 y.o.  1960    PCP on record: Pelon Ta MD    Admit date: 8/1/2018  Discharge date and time: 8/4/2018      DISCHARGE DIAGNOSIS:    Intermittent right sided paresthesias, unclear etiology  Acute on chronic migraines  Asymptomatic bradycardia  Hypothyroidism  History of depression and anxiety  History of coiled right hypophyseal aneurysm      CONSULTATIONS:  IP CONSULT TO NEUROLOGY  IP CONSULT TO CARDIOLOGY    Excerpted HPI from H&P of Royr Vargas MD:  CHIEF COMPLAINT: Rt sided weakness     HISTORY OF PRESENT ILLNESS:     This a 57-year-old female with past medical history of chronic migraines, hypothyroidism, depression, generalized anxiety disorder is coming to the hospital with chief complaints of right-sided weakness since last 2 days.  Patient reports being in his usual state of health until about 10 days ago when she developed twitching movements involving the right side of her face, neck and also had some twitching since the right home.  She presented to [address] [place] [address] for above complaints and was told that she might be having a dystonic reaction secondary to medication (Rexulti) and was advised to stop this medication was discharged for outpatient follow-up.  She reports that after going home for twitching movements got better but developed new right arm and leg weakness since the last 2 days. Yash Cardozo is also having intermittent problems with vision which she describes as vision loss.  Does not report any facial deviation, slurred speech.  She denies loss of consciousness.  She denies seizure-like activity.  She denies passing out. On arrival to the hospital, she had a CT of brain which did not reveal any acute process.  Her neurological exam was completely benign upon arrival.     We were asked to admit for work up and evaluation of the above problems.      ______________________________________________________________________  DISCHARGE SUMMARY/HOSPITAL COURSE:  for full details see H&P, daily progress notes, labs, consult notes. Suspected transient ischemic attack, but neuro does not feel this is central  Resolving acute dystonic reaction from [address]  She reports having new symptoms which are quite different from twitching movement she had about 10 days ago  Exam currently is nonfocal  Initiate complete workup for TIA  MRI of the brain- negative for CVA, despite persistent sxs, delay bc unclear if coil was mri compatible, verified ok   ultrasound carotids and 2D echocardiogram- normal   hemoglobin A1c 6.4, TSH normal and fasting lipid profile, LDL 72  She is on aspirin at home which will be held and will change to Plavix 75 mg daily.  stop Lipitor since not felt to be tia/cva  Consult PT OT and speech therapy. apprecaite neurology eval.  Monitor for any worsening of dystonic reaction currently patient reports that her twitching movements are much better  R shoulder xray-neg  Ok to dc home per neuro since mri neg, with outpt PT and f/u with primary neurologist and NS     Acute on chronic Migraine- cont Topamax, prn Fioricet helped     Bradycardia, asym - appreciate cards eval, no further w/u, cont tele while in house, asym f/u as outpt    Hypothyroidism  Resume home levothyroxine  tsh normal    Depression with generalized anxiety  Resume home Prozac  Hold Xanax as she is already on Restoril     Code Status: full    Recommended Disposition: Home w/Family PT recs for op tx referral at [place]    Patient will be discharged home today. She does not feel she needs home health and reports that she already has a physical therapist that works with her and will continue that as an outpatient.  She will need to follow-up with her primary care physician next week as well as neurosurgery and neurology and will also have a follow-up with cardiology due to her persistent asymptomatic bradycardia.      _______________________________________________________________________  Patient seen and examined by me on discharge day. Pertinent Findings:  Patient denies headache currently she still has intermittent tingling in her right side of her face and right arm. She is eager to go home. She says she has an appointment with her neurologist on 15 August and I have asked her to make a follow-up appointment with her neurosurgeon at [facility] in the next couple weeks. Patient is awake and alert oriented ×3 no distress perivascular regular rate lungs are clear abdomen benign neuro exam is grossly nonfocal  _______________________________________________________________________  DISCHARGE MEDICATIONS:   Current Discharge Medication List      CONTINUE these medications which have NOT CHANGED    Details   ALPRAZolam (XANAX) 1 mg tablet Take 1 mg by mouth daily as needed for Anxiety. Pt takes 3 mg ER daily and 1 mg PRN      dextroamphetamine-amphetamine (ADDERALL) 20 mg tablet Take 20 mg by mouth two (2) times a day. FLUoxetine (PROZAC) 20 mg tablet Take 40 mg by mouth daily. levothyroxine (SYNTHROID) 75 mcg tablet Take 75 mcg by mouth Daily (before breakfast). magnesium 250 mg tab Take 1 Tab by mouth nightly. ALPRAZolam (XANAX XR) 3 mg XR tablet Take 3 mg by mouth daily. Pt takes 3 mg ER daily and 1 mg PRN       neomycin-bacitracnZn-polymyxnB (NEOSPORIN) 3.5-400-5,000 mg-unit-unit oipk ointment Apply 1 Packet to affected area two (2) times daily as needed (cuts). temazepam (RESTORIL) 15 mg capsule Take 15 mg by mouth nightly as needed for Sleep. topiramate (TOPAMAX) 100 mg tablet Take 100 mg by mouth two (2) times a day. butalbital-acetaminophen-caff (FIORICET) -40 mg per capsule Take 1 Cap by mouth two (2) times daily as needed for Pain or Headache (twice daily). methocarbamol (ROBAXIN) 750 mg tablet Take 750 mg by mouth four (4) times daily as needed. Pt uses PRN for spasms and cramping      acetaminophen (TYLENOL) 325 mg tablet Take 650 mg by mouth every four (4) hours as needed for Pain. busPIRone (BUSPAR) 5 mg tablet TAKE 1 TABLET BY MOUTH QHS AS NEEDED FOR ANXIETY  Refills: 2      cyclobenzaprine (FLEXERIL) 5 mg tablet Take 5 mg by mouth nightly as needed for Muscle Spasm(s). erenumab-aooe (AIMOVIG AUTOINJECTOR, 2 PACK,) 70 mg/mL atIn 1 Syringe by SubCUTAneous route every month. Qty: 2 Syringe, Refills: 0      aspirin 81 mg chewable tablet Take 81 mg by mouth daily. My Recommended Diet, Activity, Wound Care, and follow-up labs are listed in the patient's Discharge Insturctions which I have personally completed and reviewed.     ______________________________________________________________________    Risk of deterioration: Moderate    Condition at Discharge:  Stable  ______________________________________________________________________    Disposition  Home with family, no needs  ______________________________________________________________________    Care Plan discussed with:   Patient, Family, RN, Care Manager, Consultant    ______________________________________________________________________    Code Status: Full Code  ______________________________________________________________________      Follow up with:   PCP : Demetrio Quispe MD  Follow-up Information     Follow up With Details Comments Contact Info    Demetrio Quispe MD   [place] [address]  [phone]      Demet[facility] Quispe MD [facility] 1 week  [facility]  [phone]                Total time in minutes spent coordinating this discharge (includes going over instructions, follow-up, prescriptions, and preparing report for sign off to her PCP) :  35 minutes    Signed:  Ricardo Cruz MD

## 2018-08-04 NOTE — PROGRESS NOTES
* No surgery found *  * No surgery found *  Bedside shift change report given to HECTOR GOMES RN(oncoming nurse) by Marina Patel RN (offgoing nurse). Report included the following information AR.     Zone Phone:   [phone]        Significant changes during shift:  MRI done, cardiologist saw pt and stated from his point of view she can be discharge     Patient Information     Ger Cervantes  56 y.o.  8/1/2018  7:02 PM by Yanelis Willingham MD. Ger Cervantes was admitted from Home     Problem List          Patient Active Problem List     Diagnosis Date Noted    Bilateral carotid artery stenosis 08/02/2018    TIA (transient ischemic attack) 08/01/2018    Recurrent depression ([address] 01/16/2018    Headache 03/11/2017           Past Medical History:   Diagnosis Date    Anxiety disorder      Depression      Headache      Spinal stenosis      Thyroid disease       hypothyroid            Core Measures:     CVA: Yes Yes  CHF:No No  PNA:No No     0}     Activity Status:     OOB to Chair No  Ambulated this shift Yes   Bed Rest No     Supplemental O2: (If Applicable)     NC No  NRB No  Venti-mask No  On  Liters/min        LINES AND DRAINS:        DVT prophylaxis:     DVT prophylaxis Med- Yes  DVT prophylaxis SCD or JOYCE- No      Wounds: (If Applicable)     Wounds- No     Location      Patient Safety:     Falls Score Total Score: 2  Safety Level_______  Bed Alarm On? No  Sitter?  No     Plan for upcoming shift: Neurochecks,          Discharge Plan: Yes possible   Active Consults:  IP CONSULT TO NEUROLOGY

## 2018-08-05 LAB
BACTERIA SPEC CULT: ABNORMAL
BACTERIA SPEC CULT: ABNORMAL
CC UR VC: ABNORMAL
SERVICE CMNT-IMP: ABNORMAL

## 2018-08-10 ENCOUNTER — TELEPHONE (OUTPATIENT)
Dept: NEUROLOGY | Age: 58
End: 2018-08-10

## 2018-08-10 NOTE — TELEPHONE ENCOUNTER
Re: Botox     PA submitted via CMM to [employer]. [place] Pending status:  \"[employer] is reviewing your PA request. Typically an electronic response will be received within 72 hours. To check for an update later, open this request from your dashboard. \"      Will process [id number] PA once  is approved.

## 2018-08-13 NOTE — TELEPHONE ENCOUNTER
Re: Botox    Approval received from [employer].  approved. Auth # [id number]. Auth good 8/10/18 - 11/10/18.    [id number] no PA required per Nesha @ [employer]. Call reference # [id number]. SPP is Javid. Phone # is [phone]. Forward to nurse. Please schedule shipment.

## 2018-08-28 ENCOUNTER — DOCUMENTATION ONLY (OUTPATIENT)
Dept: NEUROLOGY | Age: 58
End: 2018-08-28

## 2018-09-13 ENCOUNTER — OFFICE VISIT (OUTPATIENT)
Dept: NEUROLOGY | Age: 58
End: 2018-09-13

## 2018-09-13 VITALS
WEIGHT: 119 LBS | BODY MASS INDEX: 23.99 KG/M2 | OXYGEN SATURATION: 99 % | HEART RATE: 69 BPM | DIASTOLIC BLOOD PRESSURE: 60 MMHG | RESPIRATION RATE: 14 BRPM | HEIGHT: 59 IN | SYSTOLIC BLOOD PRESSURE: 130 MMHG

## 2018-09-13 DIAGNOSIS — G43.719 INTRACTABLE CHRONIC MIGRAINE WITHOUT AURA AND WITHOUT STATUS MIGRAINOSUS: Primary | ICD-10-CM

## 2018-09-13 NOTE — PROGRESS NOTES
Botox Injection Note     2018     Patient:  Edward Kevin   YOB: 1960  Date of Visit: 2018      Indication: patient has chronic migraine headaches greater than 15 days per month lasting more than 4 hours each. She has failed or not tolerated 2 or more medication preventatives. Written consent was signed and verified by me. Risks and benefits were discussed to include possible cosmetic asymmetry which is not permament or life threatening. Patient name and  was confirmed prior to procedure. Time out performed. Procedure:   Botox concentration: 200 units in 2 ml of preservative-free normal saline. 1:1 dilution. LOT#: [id number]  EXP:  2021    Injections and distribution as follow :      Units/site  Sites Loc Subtotal    procerus 5 1 ML 5   corrugaters 2.5 2 BL 5   frontalis 5 8 BL 40   Temporalis 10 4 BL 40   Occipitalis 10 2 BL 20   Cervical paraspinals 5 4 BL 20   Trapezius 10 4 BL 40         200 units Botox were reconstituted, 170 units injected as above and the remainder was unavoidably wasted. Patient tolerated procedure well. Return in 3 months for repeat injections.     _____________________________   Shanita Ip, DO  [address], ABPN

## 2018-09-13 NOTE — MR AVS SNAPSHOT
[facility] [address] [place] [address] 
[phone] Patient: Jordy Linn MRN: [id number] :1960 Visit Information Date & Time Provider Department Dept. Phone Encounter #  
 2018  9:30 AM Michael Rudd [facility] at [address] [place] [address] Road [id number] Follow-up Instructions Return OCT appt set. Your Appointments 10/22/2018  2:40 PM  
[facility] with [facility] at [facility]) Appt Note: 3 month follow up KRU   
 [address] [address]  
[phone]  
  
   
 [address]  [address] Upcoming Health Maintenance Date Due Hepatitis C Screening 1960 DTaP/Tdap/Td series (1 - Tdap) 1983 PAP AKA CERVICAL CYTOLOGY 1983 FOBT Q 1 YEAR AGE 50-75 2012 Influenza Age 5 to Adult 2018 BREAST CANCER SCRN MAMMOGRAM 3/16/2019 Allergies as of 2018  Review Complete On: 2018 By: [address] [place] [address], DO No Known Allergies Current Immunizations  Never Reviewed Name Date Influenza Vaccine (Quad) PF 3/13/2017  9:39 AM  
  
 Not reviewed this visit You Were Diagnosed With   
  
 Codes Comments Intractable chronic migraine without aura and without status migrainosus    -  Primary ICD-10-CM: N52.041 ICD-9-CM: 346.71 Vitals Resp Height(growth percentile) Weight(growth percentile) LMP BMI OB Status 14 4' 11\" (1.499 m) 119 lb (54 kg) 2013 24.04 kg/m2 Postmenopausal  
 Smoking Status Never Smoker Vitals History BMI and BSA Data Body Mass Index Body Surface Area 24.04 kg/m 2 1.5 m 2 Preferred Pharmacy Pharmacy Name Phone  [id number] [address] [place] [facility] [phone] Your Updated Medication List  
  
   
This list is accurate as of 9/13/18  9:40 AM.  Always use your most recent med list.  
  
  
  
  
 * ALPRAZolam 1 mg tablet Commonly known as:  Ace Sa Take 1 mg by mouth daily as needed for Anxiety. Pt takes 3 mg ER daily and 1 mg PRN  
  
 * XANAX XR 3 mg XR tablet Generic drug:  ALPRAZolam  
Take 3 mg by mouth daily. Pt takes 3 mg ER daily and 1 mg PRN  
  
 aspirin 81 mg chewable tablet Take 81 mg by mouth daily. busPIRone 5 mg tablet Commonly known as:  BUSPAR  
TAKE 1 TABLET BY MOUTH QHS AS NEEDED FOR ANXIETY  
  
 butalbital-acetaminophen-caff -40 mg per capsule Commonly known as:  [employer] Take 1 Cap by mouth two (2) times daily as needed for Pain or Headache (twice daily). cyclobenzaprine 5 mg tablet Commonly known as:  FLEXERIL Take 5 mg by mouth nightly as needed for Muscle Spasm(s). dextroamphetamine-amphetamine 20 mg tablet Commonly known as:  ADDERALL Take 20 mg by mouth two (2) times a day. erenumab-aooe 70 mg/mL Atin Commonly known as:  AIMOVIG AUTOINJECTOR (2 PACK) 1 Syringe by SubCUTAneous route every month. FLUoxetine 20 mg tablet Commonly known as:  PROzac Take 40 mg by mouth daily. levothyroxine 75 mcg tablet Commonly known as:  SYNTHROID Take 75 mcg by mouth Daily (before breakfast). magnesium 250 mg Tab Take 1 Tab by mouth nightly. methocarbamol 750 mg tablet Commonly known as:  ROBAXIN Take 750 mg by mouth four (4) times daily as needed. Pt uses PRN for spasms and cramping  
  
 neomycin-bacitracnZn-polymyxnB 3.5-400-5,000 mg-unit-unit Oipk ointment Commonly known as:  NEOSPORIN Apply 1 Packet to affected area two (2) times daily as needed (cuts). OTHER  
oupt PT/OT/ST, resume  
  
 temazepam 15 mg capsule Commonly known as:  RESTORIL Take 15 mg by mouth nightly as needed for Sleep. TOPAMAX 100 mg tablet Generic drug:  topiramate Take 100 mg by mouth two (2) times a day. TYLENOL 325 mg tablet Generic drug:  acetaminophen Take 650 mg by mouth every four (4) hours as needed for Pain. * Notice: This list has 2 medication(s) that are the same as other medications prescribed for you. Read the directions carefully, and ask your doctor or other care provider to review them with you. We Performed the Following [address] [id number] CPT(R)] Follow-up Instructions Return OCT appt set. Introducing [facility] SERVICES! Dear Jaskaran King: 
Thank you for requesting a [employer] account. Our records indicate that you already have an active [employer] account. You can access your account anytime at [email or web address] Did you know that you can access your hospital and ER discharge instructions at any time in [employer]? You can also review all of your test results from your hospital stay or ER visit. Additional Information If you have questions, please visit the Frequently Asked Questions section of the [employer] website at [email or web address] Remember, [employer] is NOT to be used for urgent needs. For medical emergencies, dial 911. Now available from your iPhone and Android! Please provide this summary of care documentation to your next provider. Your primary care clinician is listed as Anastacia Heard. If you have any questions after today's visit, please call [phone].

## 2018-09-25 ENCOUNTER — TELEPHONE (OUTPATIENT)
Dept: NEUROLOGY | Age: 58
End: 2018-09-25

## 2018-09-25 NOTE — TELEPHONE ENCOUNTER
Rcv'd request from [facility] requesting records from 6/14/18 thru present day for this pt. Faxed (3)  OV notes as requested.

## 2018-10-15 ENCOUNTER — TELEPHONE (OUTPATIENT)
Dept: NEUROLOGY | Age: 58
End: 2018-10-15

## 2018-10-22 ENCOUNTER — OFFICE VISIT (OUTPATIENT)
Dept: NEUROLOGY | Age: 58
End: 2018-10-22

## 2018-10-22 VITALS
HEIGHT: 59 IN | WEIGHT: 117 LBS | HEART RATE: 84 BPM | BODY MASS INDEX: 23.59 KG/M2 | DIASTOLIC BLOOD PRESSURE: 82 MMHG | OXYGEN SATURATION: 99 % | SYSTOLIC BLOOD PRESSURE: 122 MMHG | RESPIRATION RATE: 18 BRPM

## 2018-10-22 DIAGNOSIS — F09 COGNITIVE DISORDER: ICD-10-CM

## 2018-10-22 DIAGNOSIS — G43.719 INTRACTABLE CHRONIC MIGRAINE WITHOUT AURA AND WITHOUT STATUS MIGRAINOSUS: Primary | ICD-10-CM

## 2018-10-22 RX ORDER — BUPROPION HYDROCHLORIDE 150 MG/1
150 TABLET ORAL
COMMUNITY
End: 2019-09-30

## 2018-10-22 RX ORDER — CLONAZEPAM 1 MG/1
TABLET ORAL 2 TIMES DAILY
COMMUNITY
End: 2019-09-30

## 2018-10-22 RX ORDER — PAROXETINE HYDROCHLORIDE 20 MG/1
TABLET, FILM COATED ORAL DAILY
COMMUNITY
End: 2019-09-30

## 2018-10-22 NOTE — PROGRESS NOTES
Pt here for follow up on her headaches. Big med changes in the last few days. Headaches at 2 or 3 a week. Still under a lot of stress.

## 2018-10-22 NOTE — LETTER
10/22/2018 Patient:  Jo Long YOB: 1960 Date of Visit: 10/22/2018 Dear Shanita Valle MD 
[address] [address] VIA Facsimile: [phone] 
 : 
 
 
I was requested by Bhavna Bustillo MD to evaluate Ms. Kendrick Beltran  for Chief Complaint Patient presents with  Migraine Rex Soliz I am recommending the following:  
 
Diagnoses and all orders for this visit: 
 
1. Intractable chronic migraine without aura and without status migrainosus 2. Cognitive disorder 
 
 
 
---------------------------------------------------------------------------------------------------------------------- Below is my encounter: Chief Complaint Patient presents with  Migraine HPI Vanessa Rahman is a 56-year-old woman here to follow-up. She had her second round of Botox injections September 13 with good results. She has had only 3 breakthrough migraines since her injections. Despite this she still struggles with daily stress and anxiety and depression mainly from family stressors with her son and now her  who had various activities that she was unaware of. Concomitantly she was also receiving Aimovig which she thinks is helpful as well. She continues to see psychiatry and has had multiple medication changes. Review of Systems Constitutional: Positive for malaise/fatigue. Eyes: Negative for double vision. Neurological: Positive for headaches. Negative for focal weakness. Psychiatric/Behavioral: Positive for depression. The patient is nervous/anxious. All other systems reviewed and are negative. Past Medical History:  
Diagnosis Date  Anxiety disorder  Depression  Headache  Spinal stenosis  Thyroid disease   
 hypothyroid History reviewed. No pertinent family history. Social History Socioeconomic History  Marital status:  Spouse name: Not on file  Number of children: Not on file  Years of education: Not on file  Highest education level: Not on file Social Needs  Financial resource strain: Not on file  Food insecurity - worry: Not on file  Food insecurity - inability: Not on file  Transportation needs - medical: Not on file  Transportation needs - non-medical: Not on file Occupational History  Not on file Tobacco Use  Smoking status: Never Smoker  Smokeless tobacco: Never Used Substance and Sexual Activity  Alcohol use: Yes Alcohol/week: 1.2 oz Types: 2 Glasses of wine per week Comment: socially  Drug use: No  
 Sexual activity: Not on file Other Topics Concern  Not on file Social History Narrative  Not on file No Known Allergies Current Outpatient Medications Medication Sig  clonazePAM (KLONOPIN) 1 mg tablet Take  by mouth two (2) times a day.  PARoxetine (PAXIL) 20 mg tablet Take  by mouth daily.  buPROPion XL (WELLBUTRIN XL) 150 mg tablet Take 150 mg by mouth every morning.  OTHER oupt PT/OT/ST, resume  dextroamphetamine-amphetamine (ADDERALL) 20 mg tablet Take 20 mg by mouth two (2) times a day.  levothyroxine (SYNTHROID) 75 mcg tablet Take 75 mcg by mouth Daily (before breakfast).  magnesium 250 mg tab Take 1 Tab by mouth nightly.  topiramate (TOPAMAX) 100 mg tablet Take 100 mg by mouth two (2) times a day.  erenumab-aooe (AIMOVIG AUTOINJECTOR, 2 PACK,) 70 mg/mL atIn 1 Syringe by SubCUTAneous route every month.  aspirin 81 mg chewable tablet Take 81 mg by mouth daily.  ALPRAZolam (XANAX) 1 mg tablet Take 1 mg by mouth daily as needed for Anxiety. Pt takes 3 mg ER daily and 1 mg PRN  
 ALPRAZolam (XANAX XR) 3 mg XR tablet Take 3 mg by mouth daily. Pt takes 3 mg ER daily and 1 mg PRN   
 neomycin-bacitracnZn-polymyxnB (NEOSPORIN) 3.5-400-5,000 mg-unit-unit oipk ointment Apply 1 Packet to affected area two (2) times daily as needed (cuts).  acetaminophen (TYLENOL) 325 mg tablet Take 650 mg by mouth every four (4) hours as needed for Pain.  cyclobenzaprine (FLEXERIL) 5 mg tablet Take 5 mg by mouth nightly as needed for Muscle Spasm(s). No current facility-administered medications for this visit. Neurologic Exam  
 
Mental Status WD/WN adult in NAD, normal grooming VSS 
A&O x 3 PERRL, nonicteric Face is symmetric, tongue midline Speech is fluent and clear No limb ataxia. No abnl movements. Moving all extemities spontaneously and symmetric Normal gait CVS RRR Lungs nonlabored Skin is warm and dry Visit Vitals /82 Pulse 84 Resp 18 Ht 4' 11\" (1.499 m) Wt 53.1 kg (117 lb) LMP 04/22/2013 SpO2 99% BMI 23.63 kg/m² Assessment and Plan 1. Intractable chronic migraine without aura and without status migrainosus 2. Cognitive disorder 65-year-old woman who has severe chronic migraines in the setting of the psychiatric influences and history of concussion. She continues to struggle daily with her psychiatric conditions and current stressors. She is responding appropriately to Botox only 3 severe breakthrough migraines. She is also using concomitant Aimovig. I believe she will likely require both of these therapies as they both work completely differently on different components of her pain process. We will continue both as long as we can. If she has Botox injections next again due in December and then I will see her for medication management. Thank you for giving me the opportunity to assist in the care of Ms. Rylan Arzate. If you have questions, please do not hesitate to contact me. Sincerely, 2 [facility], DO Neurologist 
Diplomate EARL

## 2018-10-22 NOTE — PROGRESS NOTES
Chief Complaint   Patient presents with    Migraine       HPI    Olya Felipe is a 53-year-old woman here to follow-up. She had her second round of Botox injections September 13 with good results. She has had only 3 breakthrough migraines since her injections. Despite this she still struggles with daily stress and anxiety and depression mainly from family stressors with her son and now her  who had various activities that she was unaware of. Concomitantly she was also receiving Aimovig which she thinks is helpful as well. She continues to see psychiatry and has had multiple medication changes. Review of Systems   Constitutional: Positive for malaise/fatigue. Eyes: Negative for double vision. Neurological: Positive for headaches. Negative for focal weakness. Psychiatric/Behavioral: Positive for depression. The patient is nervous/anxious. All other systems reviewed and are negative. Past Medical History:   Diagnosis Date    Anxiety disorder     Depression     Headache     Spinal stenosis     Thyroid disease     hypothyroid     History reviewed. No pertinent family history. Social History     Socioeconomic History    Marital status:      Spouse name: Not on file    Number of children: Not on file    Years of education: Not on file    Highest education level: Not on file   Social Needs    Financial resource strain: Not on file    Food insecurity - worry: Not on file    Food insecurity - inability: Not on file    Transportation needs - medical: Not on file   NEBOTRADE needs - non-medical: Not on file   Occupational History    Not on file   Tobacco Use    Smoking status: Never Smoker    Smokeless tobacco: Never Used   Substance and Sexual Activity    Alcohol use:  Yes     Alcohol/week: 1.2 oz     Types: 2 Glasses of wine per week     Comment: socially    Drug use: No    Sexual activity: Not on file   Other Topics Concern    Not on file   Social History Narrative    Not on file     No Known Allergies      Current Outpatient Medications   Medication Sig    clonazePAM (KLONOPIN) 1 mg tablet Take  by mouth two (2) times a day.  PARoxetine (PAXIL) 20 mg tablet Take  by mouth daily.  buPROPion XL (WELLBUTRIN XL) 150 mg tablet Take 150 mg by mouth every morning.  OTHER oupt PT/OT/ST, resume    dextroamphetamine-amphetamine (ADDERALL) 20 mg tablet Take 20 mg by mouth two (2) times a day.  levothyroxine (SYNTHROID) 75 mcg tablet Take 75 mcg by mouth Daily (before breakfast).  magnesium 250 mg tab Take 1 Tab by mouth nightly.  topiramate (TOPAMAX) 100 mg tablet Take 100 mg by mouth two (2) times a day.  erenumab-aooe (AIMOVIG AUTOINJECTOR, 2 PACK,) 70 mg/mL atIn 1 Syringe by SubCUTAneous route every month.  aspirin 81 mg chewable tablet Take 81 mg by mouth daily.  ALPRAZolam (XANAX) 1 mg tablet Take 1 mg by mouth daily as needed for Anxiety. Pt takes 3 mg ER daily and 1 mg PRN    ALPRAZolam (XANAX XR) 3 mg XR tablet Take 3 mg by mouth daily. Pt takes 3 mg ER daily and 1 mg PRN     neomycin-bacitracnZn-polymyxnB (NEOSPORIN) 3.5-400-5,000 mg-unit-unit oipk ointment Apply 1 Packet to affected area two (2) times daily as needed (cuts).  acetaminophen (TYLENOL) 325 mg tablet Take 650 mg by mouth every four (4) hours as needed for Pain.  cyclobenzaprine (FLEXERIL) 5 mg tablet Take 5 mg by mouth nightly as needed for Muscle Spasm(s). No current facility-administered medications for this visit. Neurologic Exam     Mental Status   WD/WN adult in NAD, normal grooming  VSS  A&O x 3    PERRL, nonicteric  Face is symmetric, tongue midline  Speech is fluent and clear  No limb ataxia. No abnl movements.   Moving all extemities spontaneously and symmetric  Normal gait    CVS RRR  Lungs nonlabored  Skin is warm and dry         Visit Vitals  /82   Pulse 84   Resp 18   Ht 4' 11\" (1.499 m)   Wt 53.1 kg (117 lb)   LMP 04/22/2013   SpO2 99%   BMI 23.63 kg/m²       Assessment and Plan   Diagnoses and all orders for this visit:    1. Intractable chronic migraine without aura and without status migrainosus    2. Cognitive disorder      24-year-old woman who has severe chronic migraines in the setting of the psychiatric influences and history of concussion. She continues to struggle daily with her psychiatric conditions and current stressors. She is responding appropriately to Botox only 3 severe breakthrough migraines. She is also using concomitant Aimovig. I believe she will likely require both of these therapies as they both work completely differently on different components of her pain process. We will continue both as long as we can. If she has Botox injections next again due in December and then I will see her for medication management.       2 [facility] [address]  Diplomate NATHALIEN

## 2018-11-13 ENCOUNTER — TELEPHONE (OUTPATIENT)
Dept: NEUROLOGY | Age: 58
End: 2018-11-13

## 2018-11-13 NOTE — TELEPHONE ENCOUNTER
Re: Botox     PA submitted via CMM to [employer]. Pending status:  \"[employer] is reviewing your PA request. Typically an electronic response will be received within 72 hours. To check for an update later, open this request from your dashboard. \"      Will update when determination is made.

## 2018-11-13 NOTE — TELEPHONE ENCOUNTER
Re: Botox    Called and s/w Mary @ [facility]. Informed her that our office received a phone call from their PA dept regarding clinical questions. When Romina Heard looked up the PA request she said she is not sure why we received that call. Per Romina Heard, there is nothing more we need to do with this PA request. Clinical questions have all been answered.

## 2018-11-13 NOTE — TELEPHONE ENCOUNTER
Re: Botox    Approval received from [employer].  approved. Auth # [id number]. Auth good 11/13/18 - 2/13/19. SPP is Javid. Phone # is [phone]. Forward to nurse.

## 2018-11-13 NOTE — TELEPHONE ENCOUNTER
----- Message from Evin Valentine sent at 11/13/2018 12:44 PM EST -----  Regarding: Dr. Dior Reardon, from the prior authorization dept at [address] [place] [address], requested a call back regarding clinical questions. Best contact [phone].

## 2018-11-30 ENCOUNTER — DOCUMENTATION ONLY (OUTPATIENT)
Dept: NEUROLOGY | Age: 58
End: 2018-11-30

## 2018-12-06 ENCOUNTER — OFFICE VISIT (OUTPATIENT)
Dept: NEUROLOGY | Age: 58
End: 2018-12-06

## 2018-12-06 VITALS
HEART RATE: 84 BPM | DIASTOLIC BLOOD PRESSURE: 90 MMHG | SYSTOLIC BLOOD PRESSURE: 138 MMHG | BODY MASS INDEX: 23.26 KG/M2 | OXYGEN SATURATION: 99 % | RESPIRATION RATE: 16 BRPM | WEIGHT: 115.4 LBS | HEIGHT: 59 IN

## 2018-12-06 DIAGNOSIS — G43.719 INTRACTABLE CHRONIC MIGRAINE WITHOUT AURA AND WITHOUT STATUS MIGRAINOSUS: Primary | ICD-10-CM

## 2018-12-06 RX ORDER — TEMAZEPAM 30 MG/1
CAPSULE ORAL
COMMUNITY
End: 2019-09-30

## 2018-12-06 NOTE — PROGRESS NOTES
Botox Injection Note     2018     Patient:  Jo Long   YOB: 1960  Date of Visit: 2018      Indication: patient has chronic migraine headaches greater than 15 days per month lasting more than 4 hours each. She has failed or not tolerated 2 or more medication preventatives. Written consent was signed and verified by me. Risks and benefits were discussed to include possible cosmetic asymmetry which is not permament or life threatening. Patient name and  was confirmed prior to procedure. Time out performed. Procedure:   Botox concentration: 200 units in 2 ml of preservative-free normal saline. 1:1 dilution. LOT#: [id number]  EXP:  2021    Injections and distribution as follow :      Units/site  Sites Loc Subtotal    procerus 5 1 ML 5   corrugaters 2.5 2 BL 5   frontalis 5 8 BL 40   Temporalis 10 4 BL 40   Occipitalis 10 2 BL 20   Cervical paraspinals 5 4 BL 20   Trapezius 10 4 BL 40         200 units Botox were reconstituted, 170 units injected as above and the remainder was unavoidably wasted. Patient tolerated procedure well. Return in 3 months for repeat injections.     _____________________________   Franklin Moore DO  [address], ABPN

## 2018-12-06 NOTE — PATIENT INSTRUCTIONS
A Healthy Lifestyle: Care Instructions  Your Care Instructions    A healthy lifestyle can help you feel good, stay at a healthy weight, and have plenty of energy for both work and play. A healthy lifestyle is something you can share with your whole family. A healthy lifestyle also can lower your risk for serious health problems, such as high blood pressure, heart disease, and diabetes. You can follow a few steps listed below to improve your health and the health of your family. Follow-up care is a key part of your treatment and safety. Be sure to make and go to all appointments, and call your doctor if you are having problems. It's also a good idea to know your test results and keep a list of the medicines you take. How can you care for yourself at home? · Do not eat too much sugar, fat, or fast foods. You can still have dessert and treats now and then. The goal is moderation. · Start small to improve your eating habits. Pay attention to portion sizes, drink less juice and soda pop, and eat more fruits and vegetables. ? Eat a healthy amount of food. A 3-ounce serving of meat, for example, is about the size of a deck of cards. Fill the rest of your plate with vegetables and whole grains. ? Limit the amount of soda and sports drinks you have every day. Drink more water when you are thirsty. ? Eat at least 5 servings of fruits and vegetables every day. It may seem like a lot, but it is not hard to reach this goal. A serving or helping is 1 piece of fruit, 1 cup of vegetables, or 2 cups of leafy, raw vegetables. Have an apple or some carrot sticks as an afternoon snack instead of a candy bar. Try to have fruits and/or vegetables at every meal.  · Make exercise part of your daily routine. You may want to start with simple activities, such as walking, bicycling, or slow swimming. Try to be active 30 to 60 minutes every day. You do not need to do all 30 to 60 minutes all at once.  For example, you can exercise 3 times a day for 10 or 20 minutes. Moderate exercise is safe for most people, but it is always a good idea to talk to your doctor before starting an exercise program.  · Keep moving. Benito Distad the lawn, work in the garden, or [employer]. Take the stairs instead of the elevator at work. · If you smoke, quit. People who smoke have an increased risk for heart attack, stroke, cancer, and other lung illnesses. Quitting is hard, but there are ways to boost your chance of quitting tobacco for good. ? Use nicotine gum, patches, or lozenges. ? Ask your doctor about stop-smoking programs and medicines. ? Keep trying. In addition to reducing your risk of diseases in the future, you will notice some benefits soon after you stop using tobacco. If you have shortness of breath or asthma symptoms, they will likely get better within a few weeks after you quit. · Limit how much alcohol you drink. Moderate amounts of alcohol (up to 2 drinks a day for men, 1 drink a day for women) are okay. But drinking too much can lead to liver problems, high blood pressure, and other health problems. Family health  If you have a family, there are many things you can do together to improve your health. · Eat meals together as a family as often as possible. · Eat healthy foods. This includes fruits, vegetables, lean meats and dairy, and whole grains. · Include your family in your fitness plan. Most people think of activities such as jogging or tennis as the way to fitness, but there are many ways you and your family can be more active. Anything that makes you breathe hard and gets your heart pumping is exercise. Here are some tips:  ? Walk to do errands or to take your child to school or the bus.  ? Go for a family bike ride after dinner instead of watching TV. Where can you learn more? Go to http://andrew-rocky.info/. Enter Q897 in the search box to learn more about \"A Healthy Lifestyle: Care Instructions. \"  Current as of: December 7, 2017  Content Version: 11.8  © 5883-9004 [employer], [employer]. Care instructions adapted under license by [employer] (which disclaims liability or warranty for this information). If you have questions about a medical condition or this instruction, always ask your healthcare professional. [address] any warranty or liability for your use of this information.

## 2019-01-09 ENCOUNTER — TELEPHONE (OUTPATIENT)
Dept: NEUROLOGY | Age: 59
End: 2019-01-09

## 2019-01-09 NOTE — TELEPHONE ENCOUNTER
Pt calling stating the Aimovig needs a PA.  Please call back       Phone  Number for Korina Dusty [phone]

## 2019-01-09 NOTE — TELEPHONE ENCOUNTER
Re: Les Carrie    Before PA can be processed new Rx needs to be placed in CC. Most recent one is from 7/11/18 with 0 refills.

## 2019-01-10 NOTE — TELEPHONE ENCOUNTER
Re: Valeri Hicks    PA submitted via CMM to [employer]. Pending status:  \"[facility] is reviewing your PA request. Typically an electronic response will be received within 72 hours. To check for an update later, open this request from your dashboard. \"      Will update when determination is made.

## 2019-01-11 NOTE — TELEPHONE ENCOUNTER
Re: Fouzia Pimentel    Received fax from [employer] stating: \"This medication or product is on your plan's list of covered drugs. Prior authorization is not required at this time. \"    Called and s/w [employer] @ pt's [facility] pharmacy to see if PA is needed. Per [employer], they do not have a script for this med. Provided verbal Rx. Per [employer], claim went through. No PA needed.   Pt's co-pay will be $60.

## 2019-01-14 ENCOUNTER — TELEPHONE (OUTPATIENT)
Dept: NEUROLOGY | Age: 59
End: 2019-01-14

## 2019-01-14 RX ORDER — METHYLPREDNISOLONE 4 MG/1
TABLET ORAL
Qty: 1 DOSE PACK | Refills: 0 | Status: SHIPPED | OUTPATIENT
Start: 2019-01-14 | End: 2019-02-12

## 2019-01-14 NOTE — TELEPHONE ENCOUNTER
----- Message from Jimmie Lord sent at 1/14/2019 12:21 PM EST -----  Regarding: Dr. Davion Patel  The patient is requesting a call back from the doctor or nurse in regards to a bad headache. [phone]

## 2019-02-07 ENCOUNTER — TELEPHONE (OUTPATIENT)
Dept: NEUROLOGY | Age: 59
End: 2019-02-07

## 2019-02-07 NOTE — TELEPHONE ENCOUNTER
Pt has Number 100 pharmacy benefits. Her PA on file does not  until 19. They will not let me initiate a new PA until that one has .

## 2019-02-12 ENCOUNTER — OFFICE VISIT (OUTPATIENT)
Dept: NEUROLOGY | Age: 59
End: 2019-02-12

## 2019-02-12 VITALS
BODY MASS INDEX: 22.78 KG/M2 | HEIGHT: 59 IN | RESPIRATION RATE: 18 BRPM | HEART RATE: 94 BPM | OXYGEN SATURATION: 96 % | WEIGHT: 113 LBS | DIASTOLIC BLOOD PRESSURE: 80 MMHG | SYSTOLIC BLOOD PRESSURE: 126 MMHG

## 2019-02-12 DIAGNOSIS — G43.719 INTRACTABLE CHRONIC MIGRAINE WITHOUT AURA AND WITHOUT STATUS MIGRAINOSUS: Primary | ICD-10-CM

## 2019-02-12 NOTE — PROGRESS NOTES
Pt here for f/u on her headaches. She says she has become sensitive to pressure on the right side of her face. Depression screen already completed.

## 2019-02-12 NOTE — PROGRESS NOTES
Chief Complaint   Patient presents with    Migraine       HPI    17-year-old woman whom I see for chronic migraines in the setting of head injury. She receives Botox injections last on December 6 with good results. Less than 7 severe breakthrough migraines. She also requires Aimovig concomitantly. She is due for her injection however the medication is on back order and she has no anticipated time of receiving this medication. She is having a flare in her headaches as a result. Her stressors remain the same with family. She is also in the process of finding a new primary care doctor. She is about to have surgery for incontinence described as a retropubic sling placement. Review of Systems   Constitutional: Positive for malaise/fatigue. Eyes: Positive for photophobia. Negative for double vision. Neurological: Positive for headaches. Negative for loss of consciousness. Psychiatric/Behavioral: Positive for depression. Negative for suicidal ideas. The patient is nervous/anxious. The patient does not have insomnia. All other systems reviewed and are negative. Past Medical History:   Diagnosis Date    Anxiety disorder     Depression     Headache     Spinal stenosis     Thyroid disease     hypothyroid     History reviewed. No pertinent family history. Social History     Socioeconomic History    Marital status:      Spouse name: Not on file    Number of children: Not on file    Years of education: Not on file    Highest education level: Not on file   Social Needs    Financial resource strain: Not on file    Food insecurity - worry: Not on file    Food insecurity - inability: Not on file    Transportation needs - medical: Not on file   [employer] needs - non-medical: Not on file   Occupational History    Not on file   Tobacco Use    Smoking status: Never Smoker    Smokeless tobacco: Never Used   Substance and Sexual Activity    Alcohol use:  Yes     Alcohol/week: 1.2 oz     Types: 2 Glasses of wine per week     Comment: socially    Drug use: No    Sexual activity: Not on file   Other Topics Concern    Not on file   Social History Narrative    Not on file     No Known Allergies      Current Outpatient Medications   Medication Sig    erenumab-aooe (AIMOVIG AUTOINJECTOR) 70 mg/mL atIn 1 Syringe by SubCUTAneous route now for 1 dose.  vortioxetine (TRINTELLIX) 10 mg tablet Take  by mouth daily.  OTHER oupt PT/OT/ST, resume    ALPRAZolam (XANAX) 1 mg tablet Take 1 mg by mouth daily as needed for Anxiety. Pt takes 3 mg ER daily and 1 mg PRN    dextroamphetamine-amphetamine (ADDERALL) 20 mg tablet Take 20 mg by mouth two (2) times a day.  levothyroxine (SYNTHROID) 75 mcg tablet Take 75 mcg by mouth Daily (before breakfast).  magnesium 250 mg tab Take 1 Tab by mouth nightly.  topiramate (TOPAMAX) 100 mg tablet Take 100 mg by mouth two (2) times a day.  acetaminophen (TYLENOL) 325 mg tablet Take 650 mg by mouth every four (4) hours as needed for Pain.  erenumab-aooe (AIMOVIG AUTOINJECTOR, 2 PACK,) 70 mg/mL atIn 1 Syringe by SubCUTAneous route every month.  temazepam (RESTORIL) 30 mg capsule Take  by mouth nightly as needed for Sleep.  clonazePAM (KLONOPIN) 1 mg tablet Take  by mouth two (2) times a day.  PARoxetine (PAXIL) 20 mg tablet Take  by mouth daily.  buPROPion XL (WELLBUTRIN XL) 150 mg tablet Take 150 mg by mouth every morning.  ALPRAZolam (XANAX XR) 3 mg XR tablet Take 3 mg by mouth daily. Pt takes 3 mg ER daily and 1 mg PRN     neomycin-bacitracnZn-polymyxnB (NEOSPORIN) 3.5-400-5,000 mg-unit-unit oipk ointment Apply 1 Packet to affected area two (2) times daily as needed (cuts).  aspirin 81 mg chewable tablet Take 81 mg by mouth daily.  cyclobenzaprine (FLEXERIL) 5 mg tablet Take 5 mg by mouth nightly as needed for Muscle Spasm(s). No current facility-administered medications for this visit.             Neurologic Exam Mental Status   WD/WN adult in NAD, normal grooming  VSS  A&O x 3, needs frequent redirection to ask questions. She goes off subject easily. Speech is almost manic in tempo. Overall she remains appropriate. PERRL, nonicteric  Face is symmetric, tongue midline  Speech is fluent and clear  No limb ataxia. No abnl movements. Moving all extemities spontaneously and symmetric  Normal gait    CVS RRR  Lungs nonlabored  Skin is warm and dry         Visit Vitals  /80   Pulse 94   Resp 18   Ht 4' 11\" (1.499 m)   Wt 51.3 kg (113 lb)   LMP 04/22/2013   SpO2 96%   BMI 22.82 kg/m²       Assessment and Plan   Diagnoses and all orders for this visit:    1. Intractable chronic migraine without aura and without status migrainosus  -     THER/PROPH/DIAG INJECTION, SUBCUT/IM    Other orders  -     erenumab-aooe (AIMOVIG AUTOINJECTOR) 70 mg/mL atIn; 1 Syringe by SubCUTAneous route now for 1 dose. 61-year-old woman who has refractory chronic migraines requiring Botox and Aimovig injections who overall is doing well. She is having a slight exacerbation due to the fact that she is now due for Aimovig. Because there is a back order and it may be several weeks until she receives her medicine we are going to give her a dose here in the office do have samples available. Continue Botox next due in the spring. I have no restrictions on her receiving surgery from urology. We will send in the neurologic clearance. I will see her for Botox and for med management. I reviewed and decided to continue the current medications.       [facility], [address]  Diplomate EARL

## 2019-02-12 NOTE — TELEPHONE ENCOUNTER
I spoke to pharmacist at [facility]. Pt has asked that med be shipped through them, so would you please e-scribe Aimovig to them?

## 2019-02-12 NOTE — TELEPHONE ENCOUNTER
Pt callingMarylu told her the nurse needed to call this number for her [facility] Phone number: [phone]

## 2019-02-13 ENCOUNTER — TELEPHONE (OUTPATIENT)
Dept: NEUROLOGY | Age: 59
End: 2019-02-13

## 2019-02-13 NOTE — TELEPHONE ENCOUNTER
Re: Botox     PA submitted via CMM to [facility]. Pending status:  \"[facility] is reviewing your PA request. Typically an electronic response will be received within 72 hours. To check for an update later, open this request from your dashboard. \"      Will update when determination is made.

## 2019-02-13 NOTE — TELEPHONE ENCOUNTER
Re: Botox    Called and s/w Debi @ [facility] to return call. Per Roseanna Webb, they did receive Botox request. Request has been denied. Per Roseanna Webb, request denied because there is no documentation of pt's use of NSAIDs or triptans having decreased or medication overuse being ruled out. I informed Sohayves Alexeyaravind that the pt is not on any triptans or NSAIDs and that Botox has helped reduce her migraine days. Per Roseanna Webb, we can resubmit PA over the phone. Resubmitted PA over the phone. Per Roseanna Webb:    [id number] approved. Auth # [id number]. Auth good 2/13/19 - 5/13/19.    [address]4330 no PA required per [facility]. Confirmation # per automated system is [id number]. SPP is [employer]. Phone # is [phone]. Forward to nurse.

## 2019-02-13 NOTE — TELEPHONE ENCOUNTER
----- Message from Jeremiah Bhardwaj sent at 2/13/2019 11:07 AM EST -----  Regarding: Dr. James Acevedo., from [facility], requested a call back with additional clinical information regarding received prior authorization for Botox. Best contact [phone].

## 2019-02-19 ENCOUNTER — TELEPHONE (OUTPATIENT)
Dept: NEUROLOGY | Age: 59
End: 2019-02-19

## 2019-02-19 NOTE — TELEPHONE ENCOUNTER
----- Message from Chio Mariscal sent at 2/19/2019 12:02 PM EST -----  Regarding: Dr. Renate Nissen ([id number]) requesting a call back in regards to clarification on the units need for injection for the pt Botox.

## 2019-03-06 ENCOUNTER — DOCUMENTATION ONLY (OUTPATIENT)
Dept: NEUROLOGY | Age: 59
End: 2019-03-06

## 2019-03-14 ENCOUNTER — OFFICE VISIT (OUTPATIENT)
Dept: NEUROLOGY | Age: 59
End: 2019-03-14

## 2019-03-14 VITALS
WEIGHT: 113 LBS | HEIGHT: 59 IN | DIASTOLIC BLOOD PRESSURE: 68 MMHG | OXYGEN SATURATION: 100 % | HEART RATE: 88 BPM | BODY MASS INDEX: 22.78 KG/M2 | RESPIRATION RATE: 18 BRPM | SYSTOLIC BLOOD PRESSURE: 112 MMHG

## 2019-03-14 DIAGNOSIS — G43.719 INTRACTABLE CHRONIC MIGRAINE WITHOUT AURA AND WITHOUT STATUS MIGRAINOSUS: Primary | ICD-10-CM

## 2019-03-14 NOTE — PROGRESS NOTES
Botox Injection Note     3/14/2019     Patient:  Mariella Solano   YOB: 1960  Date of Visit: 3/14/2019      Indication: patient has chronic migraine headaches greater than 15 days per month lasting more than 4 hours each. She has failed or not tolerated 2 or more medication preventatives. Written consent was signed and verified by me. Risks and benefits were discussed to include possible cosmetic asymmetry which is not permament or life threatening. Patient name and  was confirmed prior to procedure. Time out performed. Procedure:   Botox concentration: 200 units in 2 ml of preservative-free normal saline. 1:1 dilution. LOT#: [id number]  EXP:  2021    Injections and distribution as follow :      Units/site  Sites Loc Subtotal    procerus 5 1 ML 5   corrugaters 2.5 2 BL 5   frontalis 5 8 BL 40   Temporalis 10 4 BL 40   Occipitalis 10 2 BL 20   Cervical paraspinals 5 4 BL 20   Trapezius 10 4 BL 40         200 units Botox were reconstituted, 170 units injected as above and the remainder was unavoidably wasted. Patient tolerated procedure well. Return in 3 months for repeat injections.     _____________________________   Gerardo Horne,   [address], ABPN

## 2019-03-26 RX ORDER — ERENUMAB-AOOE 70 MG/ML
INJECTION SUBCUTANEOUS
Qty: 3 ML | Refills: 6 | Status: SHIPPED | OUTPATIENT
Start: 2019-03-26 | End: 2021-06-03 | Stop reason: SDUPTHER

## 2019-04-11 ENCOUNTER — TELEPHONE (OUTPATIENT)
Dept: NEUROLOGY | Age: 59
End: 2019-04-11

## 2019-04-11 NOTE — TELEPHONE ENCOUNTER
Yes, I agree. Ms. Delmar Puentes meets criteria for chronic migraine. She has more than 15 days of headache pain a month lasting more than 4 hours each. She has had good results from Botox injections and I recommend we continue out of medical necessity. She has failed multiple oral preventatives.

## 2019-04-11 NOTE — TELEPHONE ENCOUNTER
----- Message from Miguel Karen sent at 2019 12:20 PM EDT -----  Regarding: botox claims appeal  Hi Dr. Tj Pollack,    I'm a  for [facility], and I am working on the botox claims for this pt. Her insurance is denying these claims, but will reconsider them for payment if we are able to submit documentation of the followin.) Migraines lasted at least three months before the initial botox treatment  2.) Migraines lasted at least 4 hours a day for at least 15 days per month  3.) Migraines were refractory to at least 2 prophylactic medications from different classes    Your documentation supports items 1 and 3, but I do not see evidence of 2. Would you be able to review and write a brief note in the pt's chart in order to cover 2 if appropriate? Otherwise, the pt will be responsible for the cost of the treatments. Let me know if you have questions.     Thanks,    Moses Vicente

## 2019-05-16 ENCOUNTER — TELEPHONE (OUTPATIENT)
Dept: NEUROLOGY | Age: 59
End: 2019-05-16

## 2019-05-16 NOTE — TELEPHONE ENCOUNTER
Re: Botox     PA submitted via CMM to [employer]. Pending status:  \"[facility] is reviewing your PA request. Typically an electronic response will be received within 72 hours. To check for an update later, open this request from your dashboard. \"      Will update when determination is made.

## 2019-05-20 NOTE — TELEPHONE ENCOUNTER
This patient no longer suffers from medication overuse headache. She is very well controlled on her Botox with less than 7 severe breakthroughs in between her injections. Her current medication record does not show she is taking excessive amounts of NSAIDs or triptans which would typically imply medication overuse headache potential.  I recommend that Botox continue out of medical necessity.

## 2019-05-20 NOTE — TELEPHONE ENCOUNTER
Re: Botox    Denial received from [facility]. Per denial notification:  \"Botox is denied for medical necessity. Authorization for continuation requires the following:  (1) You continue to be monitored for medication overuse headache [facility]). \"    Called and s/w Devikamony Shook @ [facility] to go over denial.   Per Devika Shook, since this is the patient's 2nd denial in 180 days, an appeal must be submitted in [facility] to get the denial overturned. I informed Devika Shook that the form on [facility] did not ask me about a question about MOH, so it does not seem right to deny the medication due to that. Per Devika Shook, she can see that I was not asked any questions about [facility] but that the pharmacist reviewing the case did reach out to the office to get this information. I told her that I do not see any messages about anyone calling from [facility] in the patient's chart and she stated that the pharmacist called when the office was closed. I emphasized my frustration over how this got denied when [facility] failed to get in touch with us. Appeal must be submitted for . Forward to Dr. Lucio Mason because supporting statement is needed.

## 2019-05-20 NOTE — TELEPHONE ENCOUNTER
Re: Botox    Expedited appeal submitted via fax to [employer]. Fax included Dr. Taylor Parker supporting statement and clinical notes (2/12/19 & 10/22/18). Fax confirmation received 5/20/19. Pending status. Will update when determination is made.

## 2019-05-21 ENCOUNTER — TELEPHONE (OUTPATIENT)
Dept: NEUROLOGY | Age: 59
End: 2019-05-21

## 2019-05-21 NOTE — TELEPHONE ENCOUNTER
Re: Botox    Approval received from [facility]. Denial overturned.  now approved. Auth/Reference # [id number]. Auth good 5/21/19 - 8/21/19.    31831 no PA required per automated system @ [facility]. Services paid at 100% of allowable amount. Confirmation # [id number]. SPP is [employer]. Phone # is [phone]. Forward to nurse.

## 2019-05-21 NOTE — TELEPHONE ENCOUNTER
S/w Chica @ [facility] and answered her one clinical question. Per Tim Bowen, we will most likely hear from them this afternoon.

## 2019-05-29 ENCOUNTER — TELEPHONE (OUTPATIENT)
Dept: NEUROLOGY | Age: 59
End: 2019-05-29

## 2019-05-29 NOTE — TELEPHONE ENCOUNTER
[facility] pharmacy calling to notify that appeal request has been approved for botox.     * Prior authorization #: [id number]  * Dates good through: (3 month) 03/21/19 --> 08/21/2019  * [id number] - pharmacy benefits ID    [paid claim 05/25/19]

## 2019-05-30 ENCOUNTER — DOCUMENTATION ONLY (OUTPATIENT)
Dept: NEUROLOGY | Age: 59
End: 2019-05-30

## 2019-06-03 ENCOUNTER — HOSPITAL ENCOUNTER (OUTPATIENT)
Dept: MAMMOGRAPHY | Age: 59
Discharge: HOME | End: 2019-06-03
Attending: FAMILY MEDICINE
Payer: COMMERCIAL

## 2019-06-03 DIAGNOSIS — Z12.39 SCREENING BREAST EXAMINATION: ICD-10-CM

## 2019-06-03 PROCEDURE — 77067 SCR MAMMO BI INCL CAD: CPT

## 2019-06-06 ENCOUNTER — TELEPHONE (OUTPATIENT)
Dept: NEUROLOGY | Age: 59
End: 2019-06-06

## 2019-06-06 ENCOUNTER — OFFICE VISIT (OUTPATIENT)
Dept: NEUROLOGY | Age: 59
End: 2019-06-06

## 2019-06-06 VITALS
RESPIRATION RATE: 18 BRPM | BODY MASS INDEX: 21.97 KG/M2 | OXYGEN SATURATION: 97 % | HEIGHT: 59 IN | DIASTOLIC BLOOD PRESSURE: 64 MMHG | HEART RATE: 91 BPM | SYSTOLIC BLOOD PRESSURE: 100 MMHG | WEIGHT: 109 LBS

## 2019-06-06 DIAGNOSIS — G43.719 INTRACTABLE CHRONIC MIGRAINE WITHOUT AURA AND WITHOUT STATUS MIGRAINOSUS: Primary | ICD-10-CM

## 2019-06-06 RX ORDER — LAMOTRIGINE 150 MG/1
200 TABLET ORAL DAILY
COMMUNITY
End: 2022-10-10

## 2019-06-06 NOTE — PROGRESS NOTES
Pt here for botox. Pt says she can tell when her botox is due, but has no idea how many headaches she has had.

## 2019-06-06 NOTE — PROGRESS NOTES
Botox Injection Note     2019     Patient:  Mary Sheppard   YOB: 1960  Date of Visit: 2019      Indication: patient has chronic migraine headaches greater than 15 days per month lasting more than 4 hours each. She has failed or not tolerated 2 or more medication preventatives. Written consent was signed and verified by me. Risks and benefits were discussed to include possible cosmetic asymmetry which is not permament or life threatening. Patient name and  was confirmed prior to procedure. Time out performed. Procedure:   Botox concentration: 200 units in 2 ml of preservative-free normal saline. 1:1 dilution. LOT#: [id number]  EXP:  2021    Injections and distribution as follow :      Units/site  Sites Loc Subtotal    procerus 5 1 ML 5   corrugaters 2.5 2 BL 5   frontalis 5 8 BL 40   Temporalis 10 4 BL 40   Occipitalis 10 2 BL 20   Cervical paraspinals 5 4 BL 20   Trapezius 10 4 BL 40         200 units Botox were reconstituted, 170 units injected as above and the remainder was unavoidably wasted. Patient tolerated procedure well. Return in 3 months for repeat injections.     _____________________________   Lear Prim, DO  [address], ABPN

## 2019-07-15 ENCOUNTER — TELEPHONE (OUTPATIENT)
Dept: NEUROLOGY | Age: 59
End: 2019-07-15

## 2019-08-13 ENCOUNTER — OFFICE VISIT (OUTPATIENT)
Dept: NEUROLOGY | Age: 59
End: 2019-08-13

## 2019-08-13 VITALS
HEIGHT: 59 IN | RESPIRATION RATE: 14 BRPM | HEART RATE: 93 BPM | OXYGEN SATURATION: 99 % | BODY MASS INDEX: 23.59 KG/M2 | DIASTOLIC BLOOD PRESSURE: 80 MMHG | SYSTOLIC BLOOD PRESSURE: 136 MMHG | WEIGHT: 117 LBS

## 2019-08-13 DIAGNOSIS — G43.719 INTRACTABLE CHRONIC MIGRAINE WITHOUT AURA AND WITHOUT STATUS MIGRAINOSUS: Primary | ICD-10-CM
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
## 2019-08-13 NOTE — PROGRESS NOTES
Chief Complaint   Patient presents with    Migraine       HPI    Sorin Toro is a 53-year-old woman I have been seeing long-term for history of headaches and head trauma from biking. She has come a long way as far as headache control. She had Botox injections June 6 with good results. She has less than 7 severe breakthrough migraines utilizing Botox. She is failed multiple oral preventatives and multiple classes of medications. She does use Aimovig concomitantly as her Botox wears off before 12 weeks. She is only on 70 mg monthly with minimal side effects. She continues to see psychology psychiatry closely. She is contemplating activating her disability policies and she is unable to work. She continues to struggle with multiple emotional stressors particularly with family addiction. Her mood is still very poor internally. No suicidal ideation. She is also reporting new spontaneous tinnitus alternating between right and left ears. No recent trauma. Review of Systems   Constitutional: Positive for malaise/fatigue. HENT: Positive for hearing loss and tinnitus. Eyes: Negative for double vision. Neurological: Positive for headaches. Psychiatric/Behavioral: Positive for depression and memory loss. Negative for suicidal ideas. The patient is nervous/anxious. All other systems reviewed and are negative. Past Medical History:   Diagnosis Date    Anxiety disorder     Depression     Headache     Spinal stenosis     Thyroid disease     hypothyroid     History reviewed. No pertinent family history.   Social History     Socioeconomic History    Marital status:      Spouse name: Not on file    Number of children: Not on file    Years of education: Not on file    Highest education level: Not on file   Occupational History    Not on file   Social Needs    Financial resource strain: Not on file    Food insecurity:     Worry: Not on file     Inability: Not on file   [place]ou[place] needs: Medical: Not on file     Non-medical: Not on file   Tobacco Use    Smoking status: Never Smoker    Smokeless tobacco: Never Used   Substance and Sexual Activity    Alcohol use: Yes     Alcohol/week: 2.0 standard drinks     Types: 2 Glasses of wine per week     Comment: socially    Drug use: No    Sexual activity: Not on file   Lifestyle    Physical activity:     Days per week: Not on file     Minutes per session: Not on file    Stress: Not on file   Relationships    Social connections:     Talks on phone: Not on file     Gets together: Not on file     Attends Uats[employer] service: Not on file     Active member of club or organization: Not on file     Attends meetings of clubs or organizations: Not on file     Relationship status: Not on file    Intimate partner violence:     Fear of current or ex partner: Not on file     Emotionally abused: Not on file     Physically abused: Not on file     Forced sexual activity: Not on file   Other Topics Concern    Not on file   Social History Narrative    Not on file     No Known Allergies      Current Outpatient Medications   Medication Sig    lamoTRIgine (LAMICTAL) 150 mg tablet Take 200 mg by mouth daily.  AIMOVIG AUTOINJECTOR 70 mg/mL atIn INJECT SUBCUTANEOUSLY 1  SYRINGE (70MG) EVERY MONTH    onabotulinumtoxinA (BOTOX) 200 unit injection Inject selected muscles head and neck bilaterally every 12 weeks    vortioxetine (TRINTELLIX) 10 mg tablet Take  by mouth daily.  OTHER oupt PT/OT/ST, resume    ALPRAZolam (XANAX) 1 mg tablet Take 1 mg by mouth daily as needed for Anxiety.  levothyroxine (SYNTHROID) 75 mcg tablet Take 75 mcg by mouth Daily (before breakfast).  magnesium 250 mg tab Take 1 Tab by mouth nightly.  neomycin-bacitracnZn-polymyxnB (NEOSPORIN) 3.5-400-5,000 mg-unit-unit oipk ointment Apply 1 Packet to affected area two (2) times daily as needed (cuts).     temazepam (RESTORIL) 30 mg capsule Take  by mouth nightly as needed for Sleep.    clonazePAM (KLONOPIN) 1 mg tablet Take  by mouth two (2) times a day.  PARoxetine (PAXIL) 20 mg tablet Take  by mouth daily.  buPROPion XL (WELLBUTRIN XL) 150 mg tablet Take 150 mg by mouth every morning.  dextroamphetamine-amphetamine (ADDERALL) 20 mg tablet Take 20 mg by mouth two (2) times a day.  ALPRAZolam (XANAX XR) 3 mg XR tablet Take 3 mg by mouth daily. Pt takes 3 mg ER daily and 1 mg PRN     aspirin 81 mg chewable tablet Take 81 mg by mouth daily.  acetaminophen (TYLENOL) 325 mg tablet Take 650 mg by mouth every four (4) hours as needed for Pain.  cyclobenzaprine (FLEXERIL) 5 mg tablet Take 5 mg by mouth nightly as needed for Muscle Spasm(s). No current facility-administered medications for this visit. Neurologic Exam     Mental Status   WD/WN adult in NAD, normal grooming  VSS  A&O x 3, attention is poor, she changes subjects rapidly without clear relationship. PERRL, nonicteric  Face is symmetric, tongue midline  Speech is fluent and clear  No limb ataxia. No abnl movements. Moving all extemities spontaneously and symmetric  Normal gait    CVS RRR  Lungs nonlabored  Skin is warm and dry         Visit Vitals  /80   Pulse 93   Resp 14   Ht 4' 11\" (1.499 m)   Wt 53.1 kg (117 lb)   LMP 04/22/2013   SpO2 99%   BMI 23.63 kg/m²       Assessment and Plan   Diagnoses and all orders for this visit:    1. Intractable chronic migraine without aura and without status migrainosus      28-year-old woman who has a long history of intractable chronic migraine doing well clinically stable on Botox injections. Less than 7 severe breakthrough migraines and no emergency room or urgent care visits. We are using Aimovig as a bridge when Botox wears off which I recommend she continue as well.   We talked at length about the emotional situation she is going through as well as talking about her current psychologist.  We talked about the new spontaneous tinnitus she is having in alternating ears. Recommend she continue to see ENT for that issue. Her memory is somewhat poor as far short-term but I think that has a lot to do with her poor attention making it difficult to maintain and solidfy new memory. I will see her for Botox injections and for medication management. Greater than 50% of this 20 minute visit was spent counseling about her diagnosis, current treatments, new symptoms, plans moving forward. This clinical note was dictated with an electronic dictation software that can make unintentional errors. If there are any questions, please contact me directly for clarification. I reviewed and decided to continue the current medications.       2 [facility], [address]  Diplomate NATHALIEN

## 2019-08-13 NOTE — PROGRESS NOTES
Pt here to follow up on her headaches. Pt says they are worse with the high heat. She also is c/o about her tinnitus.

## 2019-08-27 ENCOUNTER — TELEPHONE (OUTPATIENT)
Dept: NEUROLOGY | Age: 59
End: 2019-08-27

## 2019-08-27 NOTE — TELEPHONE ENCOUNTER
Re: Botox PA  Submitted to [facility] w/ 8/13/19 office visit. (Key: [id number])    [facility] is reviewing your PA request. Typically an electronic response will be received within 72 hours. To check for an update later, open this request from your dashboard. Status is pending. Lizy Christensen,     I have included this message on the Botox request to [facility]. If it approves in the next day or two, there may be an opportunity to schedule her sooner than October. **PLEASE EXPEDITE AS PATIENT IS DUE FOR HER NEXT BOTOX VISIT ON 9/4/19. PLEASE USE START DATE OF 8/28/19 TO ALLOW TIME FOR [facility] TO PROCESS AND SHIP TO OUR OFFICE.

## 2019-09-24 ENCOUNTER — TELEPHONE (OUTPATIENT)
Dept: NEUROLOGY | Age: 59
End: 2019-09-24

## 2019-09-24 NOTE — TELEPHONE ENCOUNTER
Faxed appeal with additional documentation to [facility] att:     For status check may call:  [phone]    Fax [phone]    Case PA [id number]

## 2019-09-24 NOTE — TELEPHONE ENCOUNTER
Okay I reviewed her case again. I have been seeing this patient long-term. She is aware of medication overuse headache that is why I do not prescribe anything stronger for her than Tylenol. She cannot have triptans because she is a history of a carotid aneurysm that required repair and therefore is a stroke risk. She also has history of transient ischemic attack which precludes use of triptans. She has a history of medication overuse headache which has since resolved since she no longer takes excessive amounts of NSAIDs. Specifically acetaminophen or ibuprofen. She has been extremely well controlled on Botox now for a chronic period of time. She has failed oral preventatives to include topiramate, amitriptyline, fluoxetine, magnesium. She is on multiple psychotropics limiting the amount of medications we can attempt. She has refractory chronic migraine.

## 2019-09-24 NOTE — TELEPHONE ENCOUNTER
Botox - denied by [facility] for BCBS/Federal plan. Per the letter, for continuation of therapy :     Specific drug names must be documented -    1. Use of acute migraine medications, NSAIDS to be decreased since the start of Botox therapy    2. Continues to be monitored for Medication Overuse Headache [facility]). Letter scanned in Media. Forward to provider.

## 2019-09-25 ENCOUNTER — TELEPHONE (OUTPATIENT)
Dept: NEUROLOGY | Age: 59
End: 2019-09-25

## 2019-09-30 ENCOUNTER — OFFICE VISIT (OUTPATIENT)
Dept: ENDOCRINOLOGY | Age: 59
End: 2019-09-30

## 2019-09-30 VITALS
SYSTOLIC BLOOD PRESSURE: 155 MMHG | BODY MASS INDEX: 23.06 KG/M2 | HEIGHT: 59 IN | WEIGHT: 114.4 LBS | HEART RATE: 87 BPM | DIASTOLIC BLOOD PRESSURE: 92 MMHG

## 2019-09-30 DIAGNOSIS — E03.9 HYPOTHYROIDISM, ADULT: Primary | ICD-10-CM

## 2019-09-30 RX ORDER — ZOLPIDEM TARTRATE 10 MG/1
TABLET ORAL
Refills: 1 | COMMUNITY
Start: 2019-09-09

## 2019-09-30 RX ORDER — TOPIRAMATE 100 MG/1
TABLET, FILM COATED ORAL
COMMUNITY
Start: 2019-08-04 | End: 2021-06-03 | Stop reason: SDUPTHER

## 2019-09-30 NOTE — PATIENT INSTRUCTIONS
Hypothyroidism  - labs on 75 mcg.      Topiramate - consider taking ER/once daily version, Such as Trokendi

## 2019-09-30 NOTE — PROGRESS NOTES
No chief complaint on file. History of Present Illness: Roro Paige is a 66 y.o. female presents for evaluation of hypothyroidism. S/p thyroid surgery in 2015 - I do not have ultrasound or pathology reports. Reports having thyroid nodule removed  Started on levothyroxine after this    Had aneurysm in her brain stented and coiled linda [facility] in 2017. Still has problems with headaches and follows with neurology . Memory was worse after surgery. Also has problems with anxiety - taking Lamictal, Trintellix    Taking levothyroxine 75 mcg - been on same dose for several years. Weight was 108 lbs. Now 114lbs. Feels her muscle mass has decreased    Went through menopause at age 50. Had some hot flashes initially, no estrogen supplementation. Feels more cold natured. Does have sweats at nights. She has more trouble sleeping. Family hx - mother was hypothyroid    Social:  Used to work at [employer]. Worked as a  for 20 years. Enjoys mountain biking. Child has had trouble with heroin addiction. He is 27. Other two children - ages 21 and 27.  has type I     Past Medical History:   Diagnosis Date    Anxiety disorder     Depression     Headache     Spinal stenosis     Thyroid disease     hypothyroid     Past Surgical History:   Procedure Laterality Date    HX HEENT      HX LUMBAR FUSION  03/16/2018    HX ORTHOPAEDIC  2002    ACL repair, pt unsure which side    HX ORTHOPAEDIC  2013    left shoulder surgery    HX UROLOGICAL      IR OCCL TXCATH HEMMORAGE W SI  01/30/2019    OR COLONOSCOPY FLX DX W/COLLJ SPEC WHEN PFRMD  5/6/2013          Current Outpatient Medications   Medication Sig    BACLOFEN PO Take  by mouth.  lamoTRIgine (LAMICTAL) 150 mg tablet Take 200 mg by mouth daily.     AIMOVIG AUTOINJECTOR 70 mg/mL atIn INJECT SUBCUTANEOUSLY 1  SYRINGE (70MG) EVERY MONTH    onabotulinumtoxinA (BOTOX) 200 unit injection Inject selected muscles head and neck bilaterally every 12 weeks    vortioxetine (TRINTELLIX) 10 mg tablet Take  by mouth daily.  OTHER oupt PT/OT/ST, resume    ALPRAZolam (XANAX) 1 mg tablet Take 1 mg by mouth daily as needed for Anxiety.  dextroamphetamine-amphetamine (ADDERALL) 20 mg tablet Take 20 mg by mouth two (2) times a day.  levothyroxine (SYNTHROID) 75 mcg tablet Take 75 mcg by mouth Daily (before breakfast).  magnesium 250 mg tab Take 1 Tab by mouth nightly.  neomycin-bacitracnZn-polymyxnB (NEOSPORIN) 3.5-400-5,000 mg-unit-unit oipk ointment Apply 1 Packet to affected area two (2) times daily as needed (cuts).  topiramate (TOPAMAX) 100 mg tablet     zolpidem (AMBIEN) 10 mg tablet TAKE 1 TABLET BY MOUTH AT BEDTIME AS NEEDED FOR SLEEP    temazepam (RESTORIL) 30 mg capsule Take  by mouth nightly as needed for Sleep.  clonazePAM (KLONOPIN) 1 mg tablet Take  by mouth two (2) times a day.  PARoxetine (PAXIL) 20 mg tablet Take  by mouth daily.  buPROPion XL (WELLBUTRIN XL) 150 mg tablet Take 150 mg by mouth every morning.  ALPRAZolam (XANAX XR) 3 mg XR tablet Take 3 mg by mouth daily. Pt takes 3 mg ER daily and 1 mg PRN     aspirin 81 mg chewable tablet Take 81 mg by mouth daily.  acetaminophen (TYLENOL) 325 mg tablet Take 650 mg by mouth every four (4) hours as needed for Pain.  cyclobenzaprine (FLEXERIL) 5 mg tablet Take 5 mg by mouth nightly as needed for Muscle Spasm(s). No current facility-administered medications for this visit. No Known Allergies  No family history on file.   Social History     Socioeconomic History    Marital status:      Spouse name: Not on file    Number of children: Not on file    Years of education: Not on file    Highest education level: Not on file   Occupational History    Not on file   Social Needs    Financial resource strain: Not on file    Food insecurity:     Worry: Not on file     Inability: Not on file    Transportation needs:     Medical: Not on file     Non-medical: Not on file   Tobacco Use    Smoking status: Never Smoker    Smokeless tobacco: Never Used   Substance and Sexual Activity    Alcohol use: Yes     Alcohol/week: 2.0 standard drinks     Types: 2 Glasses of wine per week     Comment: socially    Drug use: No    Sexual activity: Not on file   Lifestyle    Physical activity:     Days per week: Not on file     Minutes per session: Not on file    Stress: Not on file   Relationships    Social connections:     Talks on phone: Not on file     Gets together: Not on file     Attends Latter-day service: Not on file     Active member of club or organization: Not on file     Attends meetings of clubs or organizations: Not on file     Relationship status: Not on file    Intimate partner violence:     Fear of current or ex partner: Not on file     Emotionally abused: Not on file     Physically abused: Not on file     Forced sexual activity: Not on file   Other Topics Concern    Not on file   Social History Narrative    Not on file       Review of Systems:  - Constitutional Symptoms: no fevers, chills, see HPI  - Eyes: Vision is stable.   Wears glasses  - Cardiovascular: no chest pain or palpitations  - Respiratory: no cough or shortness of breath  - Gastrointestinal: no dysphagia or abdominal pain  - Musculoskeletal: no joint pains or weakness  - Integumentary: no rashes  - Neurological: no numbness, tingling, or headaches  - Psychiatric: See HPI  Endocrine: See HPI  Physical Examination:  Visit Vitals  BP (!) 155/92 (BP 1 Location: Left arm, BP Patient Position: Sitting)   Pulse 87   Ht 4' 11\" (1.499 m)   Wt 114 lb 6.4 oz (51.9 kg)   LMP 04/22/2013   BMI 23.11 kg/m²   -   - General: pleasant, no distress,   - HEENT:No scleral/conjunctival injection, EOMI,  MMM  - Neck: supple, no thyromegaly, masses, lymph nodes, no supraclavicular or dorsocervical fat pads  - Cardiovascular: regular, normal rate  - Respiratory: normal effort  - Integumentary:  no edema  - Neurological: alert  - Psychiatric: normal mood and affect    Data Reviewed:   Lab Results   Component Value Date/Time    Hemoglobin A1c 6.4 08/02/2018 03:17 AM      Lab Results   Component Value Date/Time    Sodium 143 08/03/2018 03:38 AM    Potassium 3.8 08/03/2018 03:38 AM    Creatinine 0.90 08/03/2018 03:38 AM        Lab Results   Component Value Date/Time    Cholesterol, total 166 08/02/2018 03:17 AM    HDL Cholesterol 74 08/02/2018 03:17 AM    LDL, calculated 72.6 08/02/2018 03:17 AM    Triglyceride 97 08/02/2018 03:17 AM      Lab Results   Component Value Date/Time    TSH 1.84 08/02/2018 03:17 AM        Assessment/Plan:   1. Hypothyroidism, adult   Labs on 75 mcg. Adjust as indicated       Patient Instructions   Hypothyroidism  - labs on 75 mcg.      Topiramate - consider taking ER/once daily version, Such as Trokendi

## 2019-10-01 LAB
T4 FREE SERPL-MCNC: 1.49 NG/DL (ref 0.82–1.77)
TSH SERPL DL<=0.005 MIU/L-ACNC: 3.01 UIU/ML (ref 0.45–4.5)

## 2019-10-04 ENCOUNTER — TELEPHONE (OUTPATIENT)
Dept: ENDOCRINOLOGY | Age: 59
End: 2019-10-04

## 2019-10-04 ENCOUNTER — DOCUMENTATION ONLY (OUTPATIENT)
Dept: NEUROLOGY | Age: 59
End: 2019-10-04

## 2019-10-04 NOTE — PROGRESS NOTES
pts botox arrived in office today from [address] [place] [address]  (formerly [facility])    Lot #  [id number]   Exp  12/2021      pts appt is 10/10/19

## 2019-10-08 NOTE — TELEPHONE ENCOUNTER
Called pt mobile phone. X2 ID verified. Spoke with pt in regards to lab results per Dr. Jaquan Bernardo. Pt stated that she was thinking that Dr. Jaquan Bernardo would order more labs than just TSH labs. And wanted me to relay this message to Dr. Jaquan Bernardo. Advised Dr. Jaquan Bernardo of this verbally. No other questions or concerns voiced at this time.

## 2019-10-08 NOTE — TELEPHONE ENCOUNTER
10/8/2019  10:12 AM        Please see message from answering service regarding lab results, please advise if an appointment is needed.             Thanks

## 2019-10-10 ENCOUNTER — OFFICE VISIT (OUTPATIENT)
Dept: NEUROLOGY | Age: 59
End: 2019-10-10

## 2019-10-10 VITALS
DIASTOLIC BLOOD PRESSURE: 86 MMHG | BODY MASS INDEX: 22.98 KG/M2 | HEIGHT: 59 IN | RESPIRATION RATE: 14 BRPM | WEIGHT: 114 LBS | SYSTOLIC BLOOD PRESSURE: 124 MMHG | HEART RATE: 103 BPM | OXYGEN SATURATION: 98 %

## 2019-10-10 DIAGNOSIS — G43.719 INTRACTABLE CHRONIC MIGRAINE WITHOUT AURA AND WITHOUT STATUS MIGRAINOSUS: Primary | ICD-10-CM

## 2019-10-10 NOTE — PROGRESS NOTES
Pt here for her botox injection. Lately they have been bad (temples and back of neck)  About 20 headaches in the past month.

## 2019-10-10 NOTE — PROGRESS NOTES
Botox Injection Note     10/10/2019     Patient:  Riya Grande   YOB: 1960  Date of Visit: 10/10/2019      Indication: patient has chronic migraine headaches greater than 15 days per month lasting more than 4 hours each. She has failed or not tolerated 2 or more medication preventatives. Written consent was signed and verified by me. Risks and benefits were discussed to include possible cosmetic asymmetry which is not permament or life threatening. Patient name and  was confirmed prior to procedure. Time out performed. Procedure:   Botox concentration: 200 units in 2 ml of preservative-free normal saline. 1:1 dilution. LOT#: [id number]  EXP:  2021    Injections and distribution as follow :      Units/site  Sites Loc Subtotal    procerus 5 1 ML 5   corrugaters 2.5 2 BL 5   frontalis 5 8 BL 40   Temporalis 10 4 BL 40   Occipitalis 10 2 BL 20   Cervical paraspinals 5 4 BL 20   Trapezius 10 4 BL 40         200 units Botox were reconstituted, 170 units injected as above and the remainder was unavoidably wasted. Patient tolerated procedure well. Return in 3 months for repeat injections.     _____________________________   Natividad Lake, DO  [address], ABPN

## 2019-10-17 ENCOUNTER — TELEPHONE (OUTPATIENT)
Dept: NEUROLOGY | Age: 59
End: 2019-10-17

## 2019-10-17 NOTE — TELEPHONE ENCOUNTER
called pt and lm on her vm. .    Need to move her 1/9/20  botox appt to 1/10/20 at 2pm.      Is this ok with her?

## 2019-12-12 ENCOUNTER — TELEPHONE (OUTPATIENT)
Dept: NEUROLOGY | Age: 59
End: 2019-12-12

## 2019-12-12 NOTE — TELEPHONE ENCOUNTER
Re: Botox     Request for  submitted to [facility] via [facility]    Botox Delorse Peppers Auth#  [id number] approved through 03/12/2020 by [facility]  Botox 04975 no PA required. ELLEN Mccoy ph [phone]    Forwarding to nurse.

## 2019-12-30 ENCOUNTER — DOCUMENTATION ONLY (OUTPATIENT)
Dept: NEUROLOGY | Age: 59
End: 2019-12-30

## 2019-12-30 NOTE — PROGRESS NOTES
pts botox arrived on 12/27/19 from [address] [place] [address]    Lot#  [id number]   Exp:  6/30/22      pts appt is 1/10/19

## 2020-01-10 ENCOUNTER — OFFICE VISIT (OUTPATIENT)
Dept: NEUROLOGY | Age: 60
End: 2020-01-10

## 2020-01-10 VITALS
DIASTOLIC BLOOD PRESSURE: 72 MMHG | BODY MASS INDEX: 22.98 KG/M2 | WEIGHT: 114 LBS | TEMPERATURE: 99 F | SYSTOLIC BLOOD PRESSURE: 130 MMHG | RESPIRATION RATE: 16 BRPM | OXYGEN SATURATION: 97 % | HEART RATE: 97 BPM | HEIGHT: 59 IN

## 2020-01-10 DIAGNOSIS — G43.719 INTRACTABLE CHRONIC MIGRAINE WITHOUT AURA AND WITHOUT STATUS MIGRAINOSUS: Primary | ICD-10-CM

## 2020-01-10 NOTE — PROGRESS NOTES
Botox Injection Note     1/10/2020     Patient:  Adrián Garcia   YOB: 1960  Date of Visit: 1/10/2020      Indication: patient has chronic migraine headaches greater than 15 days per month lasting more than 4 hours each. She has failed or not tolerated 2 or more medication preventatives. Written consent was signed and verified by me. Risks and benefits were discussed to include possible cosmetic asymmetry which is not permament or life threatening. Patient name and  was confirmed prior to procedure. Time out performed. Procedure:   Botox concentration: 200 units in 2 ml of preservative-free normal saline. 1:1 dilution. LOT#: [id number]  EXP:  2022    Injections and distribution as follow :      Units/site  Sites Loc Subtotal    procerus 5 1 ML 5   corrugaters 2.5 2 BL 5   frontalis 5 8 BL 40   Temporalis 10 4 BL 40   Occipitalis 10 2 BL 20   Cervical paraspinals 5 4 BL 20   Trapezius 10 4 BL 40         200 units Botox were reconstituted, 170 units injected as above and the remainder was unavoidably wasted. Patient tolerated procedure well. Return in 3 months for repeat injections.     _____________________________   Nupur Martines DO  [address], ABPN

## 2020-01-10 NOTE — PROGRESS NOTES
1. Have you been to the ER, urgent care clinic since your last visit? Hospitalized since your last visit? No    2. Have you seen or consulted any other health care providers outside of the [address] [place] [address] since your last visit? Include any pap smears or colon screening.  No       Chief Complaint   Patient presents with    Injection     Visit Vitals  /72 (BP 1 Location: Left arm, BP Patient Position: Sitting)   Pulse 97   Temp 99 °F (37.2 °C) (Oral)   Resp 16   Ht 4' 11\" (1.499 m)   Wt 114 lb (51.7 kg)   SpO2 97%   BMI 23.03 kg/m²

## 2020-02-17 ENCOUNTER — TELEPHONE (OUTPATIENT)
Dept: NEUROLOGY | Age: 60
End: 2020-02-17

## 2020-02-17 NOTE — TELEPHONE ENCOUNTER
----- Message from Angela Mosquera sent at 2/17/2020  1:05 PM EST -----  Regarding: Dr Ervin Sanchez  Medication Refill    Caller (if not patient):      Relationship of caller (if not patient):      Best contact number(s):[phone]      Name of medication and dosage if known:Botox      Is patient out of this medication (yes/no):  yes    Pharm[facility] name[facility] [address] listed in chart? (yes/no):yes  Pharmacy phone number:  Number listed in her chart     Details to clarify the request:      Angela Mosquera

## 2020-03-09 ENCOUNTER — TELEPHONE (OUTPATIENT)
Dept: NEUROLOGY | Age: 60
End: 2020-03-09

## 2020-03-11 ENCOUNTER — OFFICE VISIT (OUTPATIENT)
Dept: NEUROLOGY | Age: 60
End: 2020-03-11

## 2020-03-11 VITALS
HEART RATE: 78 BPM | DIASTOLIC BLOOD PRESSURE: 80 MMHG | HEIGHT: 59 IN | WEIGHT: 112.4 LBS | SYSTOLIC BLOOD PRESSURE: 124 MMHG | BODY MASS INDEX: 22.66 KG/M2 | RESPIRATION RATE: 18 BRPM | OXYGEN SATURATION: 97 %

## 2020-03-11 DIAGNOSIS — G43.719 INTRACTABLE CHRONIC MIGRAINE WITHOUT AURA AND WITHOUT STATUS MIGRAINOSUS: ICD-10-CM

## 2020-03-11 DIAGNOSIS — G43.709 CHRONIC MIGRAINE W/O AURA W/O STATUS MIGRAINOSUS, NOT INTRACTABLE: Primary | ICD-10-CM

## 2020-03-11 RX ORDER — ERENUMAB-AOOE 70 MG/ML
70 INJECTION SUBCUTANEOUS ONCE
Qty: 1 EACH | Refills: 0 | Status: SHIPPED | COMMUNITY
Start: 2020-03-11 | End: 2020-03-11

## 2020-03-11 RX ORDER — ERENUMAB-AOOE 140 MG/ML
140 INJECTION, SOLUTION SUBCUTANEOUS ONCE
Qty: 1 EACH | Refills: 5 | Status: SHIPPED | OUTPATIENT
Start: 2020-03-11 | End: 2020-03-11

## 2020-03-11 NOTE — PROGRESS NOTES
Chief Complaint   Patient presents with    Follow-up     Patient notes she has had [facility]HA  for more than half the month.  Pt had a MRI notes a bone spur to neck, had cortisone shot    Migraine     Visit Vitals  /80 (BP 1 Location: Left arm, BP Patient Position: Sitting)   Pulse 78   Resp 18   Ht 4' 11\" (1.499 m)   Wt 51 kg (112 lb 6.4 oz)   SpO2 97%   BMI 22.70 kg/m²

## 2020-03-11 NOTE — PROGRESS NOTES
Date:  20     Name:  Goyo Parker  :  1960  MRN:  [id number]     PCP:  Ximena Grove MD    Chief Complaint   Patient presents with    Follow-up     Patient notes she has had MHA  for more than half the month. Pt had a MRI notes a bone spur to neck, had cortisone shot    Migraine       HISTORY OF PRESENT ILLNESS:Follow up visit for migraines. She reports that her migraines were stable up until about 2 weeks ago. She has noticed an increase in migraines. She recently had her aimovig injection yesterday. She continues to get Botox every 3 months, the next injection day in April. She recently had an increase in neck pain as well x-rays were done and she had a bone spur cortisone injections were given. Review of Systems   Constitutional: Negative for chills and fever. Eyes: Negative for double vision. Musculoskeletal: Positive for neck pain. Neurological: Positive for headaches. Current Outpatient Medications   Medication Sig    erenumab-aooe (Aimovig Autoinjector) 70 mg/mL injection 1 mL by SubCUTAneous route once for 1 dose.  erenumab-aooe (Aimovig Autoinjector) 140 mg/mL injection 1 mL by SubCUTAneous route once for 1 dose.  onabotulinumtoxinA (BOTOX) 200 unit injection Inject selected muscles head and neck bilaterally every 12 weeks, 155 units  Indications: migraine prevention    levothyroxine (SYNTHROID) 75 mcg tablet Take 1 Tab by mouth Daily (before breakfast).  topiramate (TOPAMAX) 100 mg tablet     zolpidem (AMBIEN) 10 mg tablet TAKE 1 TABLET BY MOUTH AT BEDTIME AS NEEDED FOR SLEEP    BACLOFEN PO Take  by mouth.  lamoTRIgine (LAMICTAL) 150 mg tablet Take 200 mg by mouth daily.  AIMOVIG AUTOINJECTOR 70 mg/mL atIn INJECT SUBCUTANEOUSLY 1  SYRINGE (70MG) EVERY MONTH    vortioxetine (TRINTELLIX) 10 mg tablet Take 20 mg by mouth daily.  ALPRAZolam (XANAX) 1 mg tablet Take 1 mg by mouth daily as needed for Anxiety.     dextroamphetamine-amphetamine (ADDERALL) 20 mg tablet Take 20 mg by mouth two (2) times a day.  neomycin-bacitracnZn-polymyxnB (NEOSPORIN) 3.5-400-5,000 mg-unit-unit oipk ointment Apply 1 Packet to affected area two (2) times daily as needed (cuts).  OTHER oupt PT/OT/ST, resume    magnesium 250 mg tab Take 1 Tab by mouth nightly. No current facility-administered medications for this visit. No Known Allergies  Past Medical History:   Diagnosis Date    Anxiety disorder     Depression     Headache     Spinal stenosis     Thyroid disease     hypothyroid     Past Surgical History:   Procedure Laterality Date    HX HEENT      HX LUMBAR FUSION  03/16/2018    HX ORTHOPAEDIC  2002    ACL repair, pt unsure which side    HX ORTHOPAEDIC  2013    left shoulder surgery    HX UROLOGICAL      IR OCCL TXCATH HEMMORAGE W SI  01/30/2019    LA COLONOSCOPY FLX DX W/COLLJ SPEC WHEN PFRMD  5/6/2013          Social History     Socioeconomic History    Marital status:      Spouse name: Not on file    Number of children: Not on file    Years of education: Not on file    Highest education level: Not on file   Occupational History    Not on file   Social Needs    Financial resource strain: Not on file    Food insecurity     Worry: Not on file     Inability: Not on file   [employer] needs     Medical: Not on file     Non-medical: Not on file   Tobacco Use    Smoking status: Never Smoker    Smokeless tobacco: Never Used   Substance and Sexual Activity    Alcohol use:  Yes     Alcohol/week: 2.0 standard drinks     Types: 2 Glasses of wine per week     Comment: socially    Drug use: No    Sexual activity: Not on file   Lifestyle    Physical activity     Days per week: Not on file     Minutes per session: Not on file    Stress: Not on file   Relationships    Social connections     Talks on phone: Not on file     Gets together: Not on file     Attends Shinto service: Not on file     Active member of club or organization: Not on file     Attends meetings of clubs or organizations: Not on file     Relationship status: Not on file    Intimate partner violence     Fear of current or ex partner: Not on file     Emotionally abused: Not on file     Physically abused: Not on file     Forced sexual activity: Not on file   Other Topics Concern    Not on file   Social History Narrative    Not on file     No family history on file. PHYSICAL EXAMINATION:    Visit Vitals  /80 (BP 1 Location: Left arm, BP Patient Position: Sitting)   Pulse 78   Resp 18   Ht 4' 11\" (1.499 m)   Wt 51 kg (112 lb 6.4 oz)   SpO2 97%   BMI 22.70 kg/m²   General: Well defined, nourished, and groomed individual in no acute distress. Neck: Supple, nontender, no bruits, no pain with resistance to active range of motion. Heart: Regular rate and rhythm, no murmurs, rub, or gallop. Normal S1S2. Lungs: Clear to auscultation bilaterally with equal chest expansion, no cough, no wheeze  Musculoskeletal: Extremities revealed no edema and had full range of motion of joints. Psych: Good mood and bright affect      NEUROLOGICAL EXAMINATION:   Mental Status: Alert and oriented to person, place, and time       Cranial Nerves:   II, III, IV, VI: Visual acuity grossly intact. Visual fields are normal.   Pupils are equal, round, and reactive to light and accommodation. Extra-ocular movements are full and fluid. Fundoscopic exam was benign, no ptosis or nystagmus. V-XII: Hearing is grossly intact. Facial features are symmetric, with normal sensation and strength. The palate rises symmetrically and the tongue protrudes midline. Sternocleidomastoids 5/5.       Motor Examination: Normal tone, bulk, and strength, 5/5 muscle strength throughout.       Coordination: No resting or intention tremor      Gait and Station: Steady while walking. Normal arm swing. No pronator drift.  No muscle wasting or fasiculations noted.       Reflexes: DTRs 2+ throughout. ASSESSMENT AND PLAN    ICD-10-CM ICD-9-CM    1. Chronic migraine w/o aura w/o status migrainosus, not intractable G43.709 346.70 erenumab-aooe (Aimovig Autoinjector) 70 mg/mL injection      erenumab-aooe (Aimovig Autoinjector) 140 mg/mL injection   2. Intractable chronic migraine without aura and without status migrainosus G43.719 346.71 erenumab-aooe (Aimovig Autoinjector) 70 mg/mL injection      erenumab-aooe (Aimovig Autoinjector) 140 mg/mL injection     82-year-old female with chronic migraines. Noticed increase in migraines closer to aimovig injection day. Will increase Aimovig injections  140 mg monthly. We will give her a 70 mg dose today given that she just had her injection yesterday. We will continue Botox next appointment in April. Follow-up in 4 months  This note will not be viewable in [address]   Julienne Aquino NP

## 2020-04-07 ENCOUNTER — DOCUMENTATION ONLY (OUTPATIENT)
Dept: NEUROLOGY | Age: 60
End: 2020-04-07

## 2020-04-07 NOTE — PROGRESS NOTES
pts botox arrived in office today from [address]   Exp:  8/2022      Pts appt is 4/17/20 however due to COVID-19  All botox appts being moved to end of May 2020.

## 2020-05-21 ENCOUNTER — TELEPHONE (OUTPATIENT)
Dept: NEUROLOGY | Age: 60
End: 2020-05-21

## 2020-05-21 NOTE — TELEPHONE ENCOUNTER
Re: Tamar Oas PA request to [facility] via [facility]    Per plan: This medication or product is on your plan's list of covered drugs. Prior authorization is not required at this time. Called [facility] to clarify and per [facility], clinical staff needs to to clarify script, needs to know directions and frequency.    Please call [facility] at [phone]   Order # [id number]

## 2020-05-26 ENCOUNTER — OFFICE VISIT (OUTPATIENT)
Dept: NEUROLOGY | Age: 60
End: 2020-05-26

## 2020-05-26 VITALS
BODY MASS INDEX: 22.58 KG/M2 | SYSTOLIC BLOOD PRESSURE: 160 MMHG | HEART RATE: 88 BPM | DIASTOLIC BLOOD PRESSURE: 98 MMHG | WEIGHT: 112 LBS | RESPIRATION RATE: 14 BRPM | OXYGEN SATURATION: 100 % | HEIGHT: 59 IN

## 2020-05-26 DIAGNOSIS — G43.719 INTRACTABLE CHRONIC MIGRAINE WITHOUT AURA AND WITHOUT STATUS MIGRAINOSUS: Primary | ICD-10-CM

## 2020-05-26 RX ORDER — CLONAZEPAM 0.5 MG/1
TABLET ORAL
COMMUNITY
Start: 2020-05-22 | End: 2022-10-10

## 2020-05-26 RX ORDER — ERENUMAB-AOOE 140 MG/ML
140 INJECTION, SOLUTION SUBCUTANEOUS ONCE
Qty: 1 EACH | Refills: 0 | Status: SHIPPED | COMMUNITY
Start: 2020-05-26 | End: 2020-05-26

## 2020-05-26 RX ORDER — ERENUMAB-AOOE 140 MG/ML
140 INJECTION, SOLUTION SUBCUTANEOUS
Qty: 1 EACH | Refills: 4 | Status: CANCELLED | OUTPATIENT
Start: 2020-05-26

## 2020-05-26 NOTE — PROGRESS NOTES
Botox Injection Note     2020     Patient:  Maximo Orellana   YOB: 1960  Date of Visit: 2020      Indication: patient has chronic migraine headaches greater than 15 days per month lasting more than 4 hours each. She has failed or not tolerated 2 or more medication preventatives. Written consent was signed and verified by me. Risks and benefits were discussed to include possible cosmetic asymmetry which is not permament or life threatening. Patient name and  was confirmed prior to procedure. Time out performed. Procedure:   Botox concentration: 200 units in 2 ml of preservative-free normal saline. 1:1 dilution. LOT#: [id number]  EXP:  2022    Injections and distribution as follow :      Units/site  Sites Loc Subtotal    procerus 5 1 ML 5   corrugaters 2.5 2 BL 5   frontalis 5 8 BL 40   Temporalis 10 4 BL 40   Occipitalis 10 2 BL 20   Cervical paraspinals 5 4 BL 20   Trapezius 10 4 BL 40         200 units Botox were reconstituted, 170 units injected as above and the remainder was unavoidably wasted. Patient tolerated procedure well. Return in 3 months for repeat injections.     _____________________________   Uvaldo Mcarthur DO  [address], ABPN

## 2020-05-26 NOTE — PROGRESS NOTES
Visit Vitals  BP (!) 160/98   Pulse 88   Resp 14   Ht 4' 11\" (1.499 m)   Wt 50.8 kg (112 lb)   SpO2 100%   BMI 22.62 kg/m²     Chief Complaint   Patient presents with    Procedure     Botox late due to COVID-19, 5 times per week patient feels alot of tension and stress     Patient B/P is high she states due to pain Dr. Justina Sandifer notified. Explained to patient to let PCP know about increased B/P. Patient stated understanding.

## 2020-05-26 NOTE — TELEPHONE ENCOUNTER
Order placed forAimovig 140mg SQ every 30 days, per Verbal Order from Dr. Frances Perry on 5/26/2020 due to chronic migraine.

## 2020-06-16 ENCOUNTER — TELEPHONE (OUTPATIENT)
Dept: NEUROLOGY | Age: 60
End: 2020-06-16

## 2020-06-16 NOTE — TELEPHONE ENCOUNTER
----- Message from Armen Martinez sent at 6/16/2020  2:49 PM EDT -----  Regarding: Dr Malka Zarate  General Message/Vendor Calls    Caller's first and last name:      Reason for call:Pt is reporting that she has had a migraine for three days and is requesting a Rx called in to [address] [facility] for a medication to break the headache.       Callback required yes/no and why: yes      Best contact number(s):[phone]      Details to clarify the request:      Armen Martinez

## 2020-06-17 RX ORDER — METHYLPREDNISOLONE 4 MG/1
TABLET ORAL
Qty: 1 DOSE PACK | Refills: 0 | Status: SHIPPED | OUTPATIENT
Start: 2020-06-17 | End: 2022-06-16

## 2020-06-29 ENCOUNTER — TELEPHONE (OUTPATIENT)
Dept: NEUROLOGY | Age: 60
End: 2020-06-29

## 2020-06-29 NOTE — TELEPHONE ENCOUNTER
Called to speak with the patient she requested I call her back tomorrow 06/30/2020 as she could not talk right now.

## 2020-06-30 NOTE — TELEPHONE ENCOUNTER
Yes ok with plan I will call Dr. Velma Schaefer. Please let the patient know. Left message on his cell.

## 2020-06-30 NOTE — TELEPHONE ENCOUNTER
Patient calling stating she is seeing Barber Garrison at [facility], for depression and would like to try the medication Spravato, she states Dr. El Lang would like Dr. Jesus Guevara to approve if the patient can be on this. [phone].  Dr. El Lang could be reached on his cell, [phone]

## 2020-07-01 NOTE — TELEPHONE ENCOUNTER
I talked to Dr. Ramon Postin yesterday afternoon. It turns out she cannot have this new medication. I have also messaged him the same. she has a carotid aneurysm that we know has been there for quite a few years. Because of that abnormality she cannot take this new medication.

## 2020-07-01 NOTE — TELEPHONE ENCOUNTER
Spoke with the patient, she became very tearful and stated \"it doesn't matter,\" I advised her following up with Dr. Kathy Rodriguez to discuss alternatives, she repeatedly stated \"No it doesn't matter, it won't help anything. \" Stated she didn't have any other questions and ended call.

## 2020-07-20 ENCOUNTER — TELEPHONE (OUTPATIENT)
Dept: NEUROLOGY | Age: 60
End: 2020-07-20

## 2020-07-20 NOTE — TELEPHONE ENCOUNTER
Re: Botox approved    Approval rec'd from [facility] via [address]  Auth#  [id number] approved through 10/20/20  Botox 04660 no PA required.      SSP is [facility] [phone]

## 2020-07-22 RX ORDER — LEVOTHYROXINE SODIUM 75 UG/1
TABLET ORAL
Qty: 90 TAB | Refills: 1 | Status: SHIPPED | OUTPATIENT
Start: 2020-07-22

## 2020-08-06 ENCOUNTER — HOSPITAL ENCOUNTER (OUTPATIENT)
Dept: MAMMOGRAPHY | Age: 60
Discharge: HOME | End: 2020-08-06
Attending: FAMILY MEDICINE
Payer: MEDICARE

## 2020-08-06 DIAGNOSIS — Z12.31 VISIT FOR SCREENING MAMMOGRAM: ICD-10-CM

## 2020-08-06 PROCEDURE — 77063 BREAST TOMOSYNTHESIS BI: CPT

## 2020-09-02 ENCOUNTER — DOCUMENTATION ONLY (OUTPATIENT)
Dept: NEUROLOGY | Facility: CLINIC | Age: 60
End: 2020-09-02

## 2020-09-03 ENCOUNTER — OFFICE VISIT (OUTPATIENT)
Dept: NEUROLOGY | Facility: CLINIC | Age: 60
End: 2020-09-03
Payer: MEDICARE

## 2020-09-03 VITALS
DIASTOLIC BLOOD PRESSURE: 82 MMHG | HEIGHT: 59 IN | SYSTOLIC BLOOD PRESSURE: 142 MMHG | OXYGEN SATURATION: 98 % | HEART RATE: 84 BPM | WEIGHT: 107 LBS | BODY MASS INDEX: 21.57 KG/M2

## 2020-09-03 DIAGNOSIS — G43.719 INTRACTABLE CHRONIC MIGRAINE WITHOUT AURA AND WITHOUT STATUS MIGRAINOSUS: Primary | ICD-10-CM

## 2020-09-03 PROCEDURE — 64615 CHEMODENERV MUSC MIGRAINE: CPT | Performed by: PSYCHIATRY & NEUROLOGY

## 2020-09-03 NOTE — PROGRESS NOTES
Botox Injection Note     9/3/2020     Patient:  Gerrianne Lennox   YOB: 1960  Date of Visit: 9/3/2020      Indication: patient has chronic migraine headaches greater than 15 days per month lasting more than 4 hours each. She has failed or not tolerated 2 or more medication preventatives. Written consent was signed and verified by me. Risks and benefits were discussed to include possible cosmetic asymmetry which is not permament or life threatening. Patient name and  was confirmed prior to procedure. Time out performed. Procedure:   Botox concentration: 200 units in 2 ml of preservative-free normal saline. 1:1 dilution. LOT#: [id number]  EXP:  2023    Injections and distribution as follow :      Units/site  Sites Loc Subtotal    procerus 5 1 ML 5   corrugaters 2.5 2 BL 5   frontalis 5 8 BL 40   Temporalis 10 4 BL 40   Occipitalis 10 2 BL 20   Cervical paraspinals 5 4 BL 20   Trapezius 10 4 BL 40         200 units Botox were reconstituted, 170 units injected as above and the remainder was unavoidably wasted. Patient tolerated procedure well. Return in 3 months for repeat injections.     _____________________________   [place] Filler, DO  [address], ABPN

## 2020-11-12 ENCOUNTER — TELEPHONE (OUTPATIENT)
Dept: NEUROLOGY | Age: 60
End: 2020-11-12

## 2020-11-12 NOTE — TELEPHONE ENCOUNTER
Re: Botox approved    Approval rec'd from [facility] via [facility].    Dolly Dura     Effective 11/12/21-02/12/21  PA # [id number]    Botox [id number] no PA required.      SSP is [facility] [phone]

## 2020-11-30 ENCOUNTER — TELEPHONE (OUTPATIENT)
Dept: NEUROLOGY | Age: 60
End: 2020-11-30

## 2020-11-30 NOTE — TELEPHONE ENCOUNTER
Patient call and advised that a fax was received from patient's specialty pharmacy advising that they have been unable to reach the patient to schedule a BOTOX shipment. Patient given the phone number on the fax and stated she will call today.

## 2020-12-01 ENCOUNTER — TELEPHONE (OUTPATIENT)
Dept: NEUROLOGY | Age: 60
End: 2020-12-01

## 2020-12-03 ENCOUNTER — OFFICE VISIT (OUTPATIENT)
Dept: NEUROLOGY | Age: 60
End: 2020-12-03
Payer: MEDICARE

## 2020-12-03 VITALS
HEIGHT: 59 IN | BODY MASS INDEX: 22.78 KG/M2 | OXYGEN SATURATION: 98 % | DIASTOLIC BLOOD PRESSURE: 78 MMHG | WEIGHT: 113 LBS | SYSTOLIC BLOOD PRESSURE: 118 MMHG | HEART RATE: 80 BPM

## 2020-12-03 DIAGNOSIS — G43.719 INTRACTABLE CHRONIC MIGRAINE WITHOUT AURA AND WITHOUT STATUS MIGRAINOSUS: Primary | ICD-10-CM

## 2020-12-03 PROCEDURE — 64615 CHEMODENERV MUSC MIGRAINE: CPT | Performed by: PSYCHIATRY & NEUROLOGY

## 2020-12-03 NOTE — PROGRESS NOTES
Botox Injection Note     12/3/2020     Patient:  Cosmo Hartman   YOB: 1960  Date of Visit: 12/3/2020      Indication: patient has chronic migraine headaches greater than 15 days per month lasting more than 4 hours each. She has failed or not tolerated 2 or more medication preventatives. Written consent was signed and verified by me. Risks and benefits were discussed to include possible cosmetic asymmetry which is not permament or life threatening. Patient name and  was confirmed prior to procedure. Time out performed. Procedure:   Botox concentration: 200 units in 2 ml of preservative-free normal saline. 1:1 dilution. LOT#: [id number]  EXP:  2023    Injections and distribution as follow :      Units/site  Sites Loc Subtotal    procerus 5 1 ML 5   corrugaters 2.5 2 BL 5   frontalis 5 8 BL 40   Temporalis 10 4 BL 40   Occipitalis 10 2 BL 20   Cervical paraspinals 5 4 BL 20   Trapezius 10 4 BL 40         200 units Botox were reconstituted, 170 units injected as above and the remainder was unavoidably wasted. Patient tolerated procedure well. Return in 3 months for repeat injections.     _____________________________   Coco Cords, DO  [address], ABPN

## 2020-12-03 NOTE — PROGRESS NOTES
Chief Complaint   Patient presents with    Migraine     Follow up, here for BOTOX     Visit Vitals  /78 (BP 1 Location: Left arm, BP Patient Position: Sitting)   Pulse 80   Ht 4' 11\" (1.499 m)   Wt 113 lb (51.3 kg)   SpO2 98%   BMI 22.82 kg/m²

## 2021-01-03 ENCOUNTER — TELEPHONE (OUTPATIENT)
Dept: ENDOCRINOLOGY | Age: 61
End: 2021-01-03

## 2021-01-04 RX ORDER — LEVOTHYROXINE SODIUM 75 UG/1
TABLET ORAL
Qty: 90 TAB | Refills: 3 | OUTPATIENT
Start: 2021-01-04

## 2021-01-04 NOTE — TELEPHONE ENCOUNTER
Please send a fax to [facility] and have them stop sending refills for her levothyroxine. she is no longer under Dr. Nae Chu care since he left our practice on 1/19/20 and she will need to contact her PCP or new endocrinologist for further refills of this medication.

## 2021-01-19 ENCOUNTER — TELEPHONE (OUTPATIENT)
Dept: ENDOCRINOLOGY | Age: 61
End: 2021-01-19

## 2021-01-20 RX ORDER — LEVOTHYROXINE SODIUM 75 UG/1
TABLET ORAL
Qty: 90 TAB | Refills: 3 | OUTPATIENT
Start: 2021-01-20

## 2021-02-11 ENCOUNTER — DOCUMENTATION ONLY (OUTPATIENT)
Dept: NEUROLOGY | Age: 61
End: 2021-02-11

## 2021-02-11 ENCOUNTER — TELEPHONE (OUTPATIENT)
Dept: NEUROLOGY | Age: 61
End: 2021-02-11

## 2021-02-11 RX ORDER — ONABOTULINUMTOXINA 200 [USP'U]/1
INJECTION, POWDER, LYOPHILIZED, FOR SOLUTION INTRADERMAL; INTRAMUSCULAR
Qty: 200 UNITS | Refills: 3 | Status: SHIPPED | OUTPATIENT
Start: 2021-02-11 | End: 2021-12-09

## 2021-02-11 NOTE — TELEPHONE ENCOUNTER
Re: Botox approved    Approval rec'd from [facility] via [facility].   CharmaineEduaralfreda Joãoshawna    Effective 02/11/21-05/11/21  PA # [id number]    Botox 49153 no PA required.      SSP is [facility] [phone]

## 2021-03-03 ENCOUNTER — TELEPHONE (OUTPATIENT)
Dept: NEUROLOGY | Age: 61
End: 2021-03-03

## 2021-03-03 NOTE — TELEPHONE ENCOUNTER
----- Message from Saleem Marsh sent at 3/3/2021 12:31 PM EST -----  Regarding: Dr Crystal Lakhani is calling to set up delivery for Botox Injection, please call [phone]

## 2021-03-03 NOTE — TELEPHONE ENCOUNTER
Needing new script for botox, stated old one  in February. Stated that they were speaking to [place] earlier but call dropped. Please call. Needing script by noon so they can reach out to pt and get it out the door today for pt's appt tomorrow.

## 2021-03-04 ENCOUNTER — OFFICE VISIT (OUTPATIENT)
Dept: NEUROLOGY | Age: 61
End: 2021-03-04
Payer: MEDICARE

## 2021-03-04 ENCOUNTER — TELEPHONE (OUTPATIENT)
Dept: NEUROLOGY | Age: 61
End: 2021-03-04

## 2021-03-04 VITALS
HEIGHT: 59 IN | DIASTOLIC BLOOD PRESSURE: 82 MMHG | HEART RATE: 82 BPM | WEIGHT: 119 LBS | OXYGEN SATURATION: 98 % | SYSTOLIC BLOOD PRESSURE: 128 MMHG | BODY MASS INDEX: 23.99 KG/M2

## 2021-03-04 DIAGNOSIS — G43.719 INTRACTABLE CHRONIC MIGRAINE WITHOUT AURA AND WITHOUT STATUS MIGRAINOSUS: Primary | ICD-10-CM

## 2021-03-04 PROCEDURE — 64615 CHEMODENERV MUSC MIGRAINE: CPT | Performed by: PSYCHIATRY & NEUROLOGY

## 2021-03-04 RX ORDER — LITHIUM CARBONATE 300 MG/1
TABLET, FILM COATED, EXTENDED RELEASE ORAL
COMMUNITY
Start: 2021-02-12 | End: 2022-10-10

## 2021-03-04 NOTE — PROGRESS NOTES
Chief Complaint   Patient presents with    Procedure     Botox     Visit Vitals  /82 (BP 1 Location: Left upper arm, BP Patient Position: Sitting)   Pulse 82   Ht 4' 11\" (1.499 m)   Wt 119 lb (54 kg)   SpO2 98%   BMI 24.04 kg/m²

## 2021-03-04 NOTE — TELEPHONE ENCOUNTER
03/02/2021 Patient called, verified and notified that [facility] was called to inquire as to why BOTOX still has not been received. Patient stated she was advised BOTOX would arrive 03/02/2021. Patient very frustrated stating, \"I have never had to deal with them like this,\" I offered the patient to reschedule, patient stated she would\" like to keep appointment in case it does show up before. \"    03/03/2021 Called and LVM for the patient to advise that BOTOX had not arrived as of yet.

## 2021-05-25 ENCOUNTER — DOCUMENTATION ONLY (OUTPATIENT)
Dept: NEUROLOGY | Age: 61
End: 2021-05-25

## 2021-05-25 NOTE — PROGRESS NOTES
Re: Botox approved     Approval rec'd from [facility] via [facility].   Key:[id number]      Effective 05/24/21-08/24/2021  PA #  [id number]  Botox [id number] no PA required.      SSP is [facility] [phone]

## 2021-05-28 ENCOUNTER — DOCUMENTATION ONLY (OUTPATIENT)
Dept: NEUROLOGY | Age: 61
End: 2021-05-28

## 2021-06-03 ENCOUNTER — OFFICE VISIT (OUTPATIENT)
Dept: NEUROLOGY | Age: 61
End: 2021-06-03
Payer: MEDICARE

## 2021-06-03 VITALS — HEIGHT: 59 IN | BODY MASS INDEX: 24.04 KG/M2

## 2021-06-03 DIAGNOSIS — G43.719 INTRACTABLE CHRONIC MIGRAINE WITHOUT AURA AND WITHOUT STATUS MIGRAINOSUS: Primary | ICD-10-CM

## 2021-06-03 PROCEDURE — 64615 CHEMODENERV MUSC MIGRAINE: CPT | Performed by: PSYCHIATRY & NEUROLOGY

## 2021-06-03 RX ORDER — ERENUMAB-AOOE 70 MG/ML
INJECTION SUBCUTANEOUS
Qty: 3 ML | Refills: 3 | Status: SHIPPED | OUTPATIENT
Start: 2021-06-03 | End: 2021-06-28 | Stop reason: DRUGHIGH

## 2021-06-03 RX ORDER — TROSPIUM CHLORIDE 20 MG/1
TABLET, FILM COATED ORAL
COMMUNITY
Start: 2021-03-24

## 2021-06-03 RX ORDER — TOPIRAMATE 100 MG/1
100 TABLET, FILM COATED ORAL DAILY
Qty: 90 TABLET | Refills: 3 | Status: SHIPPED | OUTPATIENT
Start: 2021-06-03 | End: 2022-04-01

## 2021-06-03 NOTE — PROGRESS NOTES
Here for Botox. Excellent results with less than 7 breakthrough. She would like to resume topiramate as well since she ran out. She was taking 100 mg daily. Resume at 50 mg daily for 5 days then increase to 100 mg daily if needed. Immediate release topiramate. She is also on Aimovig.

## 2021-06-03 NOTE — PROGRESS NOTES
Botox Injection Note     6/3/2021     Patient:  Rajwinder Albarran   YOB: 1960  Date of Visit: 6/3/2021      Indication: patient has chronic migraine headaches greater than 15 days per month lasting more than 4 hours each. She has failed or not tolerated 2 or more medication preventatives. Written consent was signed and verified by me. Risks and benefits were discussed to include possible cosmetic asymmetry which is not permament or life threatening. Patient name and  was confirmed prior to procedure. Time out performed. Procedure:   Botox concentration: 200 units in 2 ml of preservative-free normal saline. 1:1 dilution. LOT#: [id number]  EXP:  2024    Injections and distribution as follow :      Units/site  Sites Loc Subtotal    procerus 5 1 ML 5   corrugaters 2.5 2 BL 5   frontalis 5 8 BL 40   Temporalis 10 4 BL 40   Occipitalis 10 2 BL 20   Cervical paraspinals 5 4 BL 20   Trapezius 10 4 BL 40         200 units Botox were reconstituted, 170 units injected as above and the remainder was unavoidably wasted. Patient tolerated procedure well. Return in 3 months for repeat injections.     _____________________________   Jaqueline Randhawa,   [address], ABPN

## 2021-06-21 ENCOUNTER — TELEPHONE (OUTPATIENT)
Dept: NEUROLOGY | Age: 61
End: 2021-06-21

## 2021-06-21 RX ORDER — ERENUMAB-AOOE 140 MG/ML
140 INJECTION, SOLUTION SUBCUTANEOUS ONCE
Qty: 1 EACH | Refills: 11 | Status: SHIPPED | OUTPATIENT
Start: 2021-06-21 | End: 2021-06-21

## 2021-06-21 NOTE — TELEPHONE ENCOUNTER
Pt calling stating her Aimovig was a smaller dose and she said she isn't sure why. She said she's been having a migraine and she can't seem to get rid of it.  Please call back

## 2021-06-21 NOTE — TELEPHONE ENCOUNTER
Patient called back, verified states \" the pharmacy sent me 3 of the 70mg ones, it was $100, what am I supposed to do with them. I had a 70mg shot 10 days ago, but I cannot break this headache, should I take another 70mg? \"

## 2021-06-21 NOTE — TELEPHONE ENCOUNTER
Okay I sent the 140 mg dose to her mail order. Have her come in to get an injection of one of the 70 mg syringes.

## 2021-06-21 NOTE — TELEPHONE ENCOUNTER
Patient called back, verified and notified, she stated she will call back to let us know if she wants the steroid pack sent in.

## 2021-06-21 NOTE — TELEPHONE ENCOUNTER
She is supposed to take 140 mg so go ahead and take 1 additional shot to be counted for this month. I ordered the 140s and she should use that next injection as scheduled. If she would like a steroid pack let me know.

## 2021-06-24 ENCOUNTER — TELEPHONE (OUTPATIENT)
Dept: NEUROLOGY | Age: 61
End: 2021-06-24

## 2021-06-24 NOTE — TELEPHONE ENCOUNTER
----- Message from Mendel Lands sent at 6/24/2021 12:13 PM EDT -----  Regarding: Dr. Lindy Payne Message/Vendor Calls    Caller's first and last name: Pt      Reason for call: Alyce Clark needing approval from Dr. Phillip Nur required yes/no and why: Yes      Best contact number(s):[phone]      Details to clarify the request: Pt is requesting Dr. Libra Sorto to each out to [facility]. They are needing clarification in order to fill her Aimovig. The order number they provided to pt is [id number]. [facility] has been trying to get in touch with the office to get this medication approved for pt. Please advise.        Mendel Lands

## 2021-06-25 NOTE — TELEPHONE ENCOUNTER
Re: Kelly Smyth in [facility]    Per message: This medication or product is on your plan's list of covered drugs. Prior authorization is not required at this time. This must be like what happened last year per notes    Called [facility] to clarify and per [facility], clinical staff needs to to clarify script, needs to know directions and frequency. Please call [facility] at [phone]     The order number they provided to pt is [id number].     Sent to provider since nurse it out

## 2021-06-28 RX ORDER — ERENUMAB-AOOE 140 MG/ML
140 INJECTION, SOLUTION SUBCUTANEOUS ONCE
Qty: 3 EACH | Refills: 3 | Status: SHIPPED | OUTPATIENT
Start: 2021-06-28 | End: 2021-06-28

## 2021-07-08 RX ORDER — LEVOTHYROXINE SODIUM 75 UG/1
TABLET ORAL
Qty: 90 TABLET | Refills: 1 | Status: CANCELLED | OUTPATIENT
Start: 2021-07-08

## 2021-07-08 NOTE — TELEPHONE ENCOUNTER
Spoke with pt to make her aware that on her last rx of July 2020, she was made aware that she will need to established new care with a new endo or she will need to contact her PCP for refills. Her prescription at that time was for a 90 day supply with additional  3 refills. Pt was then made aware that she will need to contact her pcp for refills. I also contacted her PCP's office and spoke with Karely Darling to make her aware that Dr Cristobal Reddy is no longer a provider of [facility] and that  we are no longer able to renew  Mrs Samantha Gomez thyroid rx. Karely Darling stated that there was a note regarding this in their sytem and Dr Neal Juan nurse was working on getting the pt in to be seen by her pcp. Pt was made aware and she stated that she is out of medication and due to it being awhile since she saw her PCP, she will have to wait too long to be seen. Pt was asked why she never scheduled a f/u appt at our office or with a new endo since her lov and she stated that she \"did not care for Dr Yadiel Henson" and did not have a reason why she did not f/u with a new endo.

## 2021-07-27 ENCOUNTER — TELEPHONE (OUTPATIENT)
Dept: NEUROLOGY | Age: 61
End: 2021-07-27

## 2021-08-09 ENCOUNTER — TELEPHONE (OUTPATIENT)
Dept: NEUROLOGY | Age: 61
End: 2021-08-09

## 2021-08-09 NOTE — TELEPHONE ENCOUNTER
Re: Botox    Rcvd expiration notice, next appt is on 09/02/21, unable to locate active auth info. Entered pt into Botox 1 and submitted a BV request. Awaiting update.

## 2021-08-13 NOTE — TELEPHONE ENCOUNTER
Rcvd BV back, scanned to chart. Per document cpt [id number] NO PA needed,  PD recommended.  Submitted PA request through [facility],     [address] [place] [address],[address] Specialty [phone]

## 2021-08-18 NOTE — TELEPHONE ENCOUNTER
Rchamida PA approval for btox effective through 11/13/2021. Sent update to nurse. [facility],[address] Specialty [phone]

## 2021-09-02 ENCOUNTER — OFFICE VISIT (OUTPATIENT)
Dept: NEUROLOGY | Age: 61
End: 2021-09-02
Payer: MEDICARE

## 2021-09-02 VITALS
DIASTOLIC BLOOD PRESSURE: 78 MMHG | SYSTOLIC BLOOD PRESSURE: 118 MMHG | HEIGHT: 59 IN | BODY MASS INDEX: 21.57 KG/M2 | WEIGHT: 107 LBS | HEART RATE: 72 BPM | OXYGEN SATURATION: 99 %

## 2021-09-02 DIAGNOSIS — G43.719 INTRACTABLE CHRONIC MIGRAINE WITHOUT AURA AND WITHOUT STATUS MIGRAINOSUS: Primary | ICD-10-CM

## 2021-09-02 PROCEDURE — 64615 CHEMODENERV MUSC MIGRAINE: CPT | Performed by: PSYCHIATRY & NEUROLOGY

## 2021-09-02 RX ORDER — HYDROXYZINE 25 MG/1
TABLET, FILM COATED ORAL
COMMUNITY
Start: 2021-08-17 | End: 2022-10-10

## 2021-09-02 RX ORDER — ERENUMAB-AOOE 140 MG/ML
INJECTION, SOLUTION SUBCUTANEOUS
COMMUNITY
Start: 2021-06-29 | End: 2022-04-18

## 2021-09-02 RX ORDER — METOPROLOL SUCCINATE 25 MG/1
TABLET, EXTENDED RELEASE ORAL
COMMUNITY
Start: 2021-08-06

## 2021-09-02 NOTE — PROGRESS NOTES
Botox Injection Note     2021     Patient:  Trevor Pritchett   YOB: 1960  Date of Visit: 2021      Indication: patient has chronic migraine headaches greater than 15 days per month lasting more than 4 hours each. She has failed or not tolerated 2 or more medication preventatives. Written consent was signed and verified by me. Risks and benefits were discussed to include possible cosmetic asymmetry which is not permament or life threatening. Patient name and  was confirmed prior to procedure. Time out performed. Procedure:   Botox concentration: 200 units in 2 ml of preservative-free normal saline. 1:1 dilution. LOT#: [id number]  EXP:  2024    Injections and distribution as follow :      Units/site  Sites Loc Subtotal    procerus 5 1 ML 5   corrugaters 2.5 2 BL 5   frontalis 5 8 BL 40   Temporalis 10 4 BL 40   Occipitalis 10 2 BL 20   Cervical paraspinals 5 4 BL 20   Trapezius 10 4 BL 40         200 units Botox were reconstituted, 170 units injected as above and the remainder was unavoidably wasted. Patient tolerated procedure well. Return in 3 months for repeat injections.     _____________________________   Victor Manuel Pillai,   [address] [facility] [address], ABPN

## 2021-09-02 NOTE — PROGRESS NOTES
Chief Complaint   Patient presents with    Procedure     BOTOX     Visit Vitals  /78 (BP 1 Location: Left upper arm, BP Patient Position: Sitting)   Pulse 72   Ht 4' 11\" (1.499 m)   Wt 107 lb (48.5 kg)   SpO2 99%   BMI 21.61 kg/m²

## 2021-11-04 ENCOUNTER — TELEPHONE (OUTPATIENT)
Dept: NEUROLOGY | Age: 61
End: 2021-11-04

## 2021-11-04 NOTE — TELEPHONE ENCOUNTER
Re: Botox    Auth terms in a week, created new case through Miguel Jansen and awaiting update, SSP would be [facility].

## 2021-11-05 ENCOUNTER — TELEPHONE (OUTPATIENT)
Dept: NEUROLOGY | Age: 61
End: 2021-11-05

## 2021-11-09 ENCOUNTER — TELEPHONE (OUTPATIENT)
Dept: NEUROLOGY | Age: 61
End: 2021-11-09

## 2021-11-23 NOTE — TELEPHONE ENCOUNTER
Re: Botox    Rcvd PA approval effective 11/04/21-02/04/22    Auth# [id number]    SSP is [facility]    [id number] NO PA Needed

## 2021-12-02 ENCOUNTER — OFFICE VISIT (OUTPATIENT)
Dept: NEUROLOGY | Age: 61
End: 2021-12-02
Payer: MEDICARE

## 2021-12-02 VITALS
OXYGEN SATURATION: 99 % | HEIGHT: 59 IN | BODY MASS INDEX: 23.18 KG/M2 | SYSTOLIC BLOOD PRESSURE: 128 MMHG | HEART RATE: 68 BPM | DIASTOLIC BLOOD PRESSURE: 80 MMHG | WEIGHT: 115 LBS

## 2021-12-02 DIAGNOSIS — G43.719 INTRACTABLE CHRONIC MIGRAINE WITHOUT AURA AND WITHOUT STATUS MIGRAINOSUS: Primary | ICD-10-CM

## 2021-12-02 PROCEDURE — 64615 CHEMODENERV MUSC MIGRAINE: CPT | Performed by: PSYCHIATRY & NEUROLOGY

## 2021-12-02 RX ORDER — MELOXICAM 15 MG/1
TABLET ORAL
COMMUNITY
Start: 2021-11-17

## 2021-12-02 RX ORDER — PERMETHRIN 50 MG/G
CREAM TOPICAL
COMMUNITY
Start: 2021-09-21

## 2021-12-02 RX ORDER — TRIAMCINOLONE ACETONIDE 1 MG/G
CREAM TOPICAL
COMMUNITY
Start: 2021-10-09

## 2021-12-02 NOTE — PROGRESS NOTES
Botox Injection Note     2021     Patient:  Raeann Marshall   YOB: 1960  Date of Visit: 2021      Indication: patient has chronic migraine headaches greater than 15 days per month lasting more than 4 hours each. She has failed or not tolerated 2 or more medication preventatives. Written consent was signed and verified by me. Risks and benefits were discussed to include possible cosmetic asymmetry which is not permament or life threatening. Patient name and  was confirmed prior to procedure. Time out performed. Procedure:   Botox concentration: 200 units in 2 ml of preservative-free normal saline. 1:1 dilution. LOT#: [id number]  EXP:  2024    Injections and distribution as follow :      Units/site  Sites Loc Subtotal    procerus 5 1 ML 5   corrugaters 2.5 2 BL 5   frontalis 5 8 BL 40   Temporalis 10 4 BL 40   Occipitalis 10 2 BL 20   Cervical paraspinals 5 4 BL 20   Trapezius 10 4 BL 40         200 units Botox were reconstituted, 170 units injected as above and the remainder was unavoidably wasted. Patient tolerated procedure well. Return in 3 months for repeat injections.     _____________________________   Leena Andersen, DO  [address], ABPN

## 2021-12-02 NOTE — PROGRESS NOTES
Chief Complaint   Patient presents with    Procedure     BOTOX     Visit Vitals  /80 (BP 1 Location: Left upper arm, BP Patient Position: Sitting)   Pulse 68   Ht 4' 11\" (1.499 m)   Wt 115 lb (52.2 kg)   SpO2 99%   BMI 23.23 kg/m² yes

## 2021-12-16 ENCOUNTER — TELEPHONE (OUTPATIENT)
Dept: ENDOCRINOLOGY | Age: 61
End: 2021-12-16

## 2021-12-16 RX ORDER — LEVOTHYROXINE SODIUM 75 UG/1
TABLET ORAL
Qty: 90 TABLET | Refills: 3 | OUTPATIENT
Start: 2021-12-16

## 2021-12-16 NOTE — TELEPHONE ENCOUNTER
Please send a fax to [facility] and have them stop sending refills for her levothyroxine. she is no longer under Dr. Madrigal Son care since he left our practice on 1/19/20 and she will need to contact her PCP or new endocrinologist for further refills of this medication.

## 2021-12-17 NOTE — TELEPHONE ENCOUNTER
Faxed and confirmation received. I spoke with patient and she stated she had requested them to send it to Dr. Anthony Herrera so she will contact them again.

## 2022-01-24 ENCOUNTER — TELEPHONE (OUTPATIENT)
Dept: NEUROLOGY | Age: 62
End: 2022-01-24

## 2022-01-24 NOTE — TELEPHONE ENCOUNTER
Re: botox    Rcvd expiration notice,  expres on 02/03/22, created care in [facility] Key# [id number]    SSP would be [facility]. [id number] no PA needed.

## 2022-01-24 NOTE — TELEPHONE ENCOUNTER
Re: Botox    Rcvd PA approval effective 01/24/22-04/24/22    Scanned to chart.     CPT 67845 NO PA NEEDED  SSP is [facility]

## 2022-02-25 ENCOUNTER — TELEPHONE (OUTPATIENT)
Dept: NEUROLOGY | Age: 62
End: 2022-02-25

## 2022-03-18 PROBLEM — F33.9 RECURRENT DEPRESSION (HCC): Status: ACTIVE | Noted: 2018-01-16

## 2022-03-19 PROBLEM — R51.9 HEADACHE: Status: ACTIVE | Noted: 2017-03-11

## 2022-03-19 PROBLEM — R00.1 SINUS BRADYCARDIA: Status: ACTIVE | Noted: 2018-08-03

## 2022-03-19 PROBLEM — I65.23 BILATERAL CAROTID ARTERY STENOSIS: Status: ACTIVE | Noted: 2018-08-02

## 2022-03-20 PROBLEM — G45.9 TIA (TRANSIENT ISCHEMIC ATTACK): Status: ACTIVE | Noted: 2018-08-01

## 2022-03-24 ENCOUNTER — OFFICE VISIT (OUTPATIENT)
Dept: NEUROLOGY | Age: 62
End: 2022-03-24
Payer: MEDICARE

## 2022-03-24 VITALS
BODY MASS INDEX: 22.98 KG/M2 | HEART RATE: 96 BPM | HEIGHT: 59 IN | SYSTOLIC BLOOD PRESSURE: 126 MMHG | DIASTOLIC BLOOD PRESSURE: 82 MMHG | WEIGHT: 114 LBS | OXYGEN SATURATION: 98 %

## 2022-03-24 DIAGNOSIS — G43.719 INTRACTABLE CHRONIC MIGRAINE WITHOUT AURA AND WITHOUT STATUS MIGRAINOSUS: Primary | ICD-10-CM

## 2022-03-24 PROCEDURE — 64615 CHEMODENERV MUSC MIGRAINE: CPT | Performed by: PSYCHIATRY & NEUROLOGY

## 2022-03-24 NOTE — PROGRESS NOTES
Chief Complaint   Patient presents with    Procedure     BOTOX     Visit Vitals  /82 (BP 1 Location: Left upper arm, BP Patient Position: Sitting)   Pulse 96   Ht 4' 11\" (1.499 m)   Wt 114 lb (51.7 kg)   SpO2 98%   BMI 23.03 kg/m²

## 2022-03-24 NOTE — PROGRESS NOTES
Botox Injection Note     3/24/2022     Patient:  Shira Valdes   YOB: 1960  Date of Visit: 3/24/2022      Indication: patient has chronic migraine headaches greater than 15 days per month lasting more than 4 hours each. She has failed or not tolerated 2 or more medication preventatives. Written consent was signed and verified by me. Risks and benefits were discussed to include possible cosmetic asymmetry which is not permament or life threatening. Patient name and  was confirmed prior to procedure. Time out performed. Procedure:   Botox concentration: 200 units in 2 ml of preservative-free normal saline. 1:1 dilution. LOT#: [id number]  EXP:  2024    Injections and distribution as follow :      Units/site  Sites Loc Subtotal    procerus 5 1 ML 5   corrugaters 2.5 2 BL 5   frontalis 5 8 BL 40   Temporalis 10 4 BL 40   Occipitalis 10 2 BL 20   Cervical paraspinals 5 4 BL 20   Trapezius 10 4 BL 40         200 units Botox were reconstituted, 170 units injected as above and the remainder was unavoidably wasted. Patient tolerated procedure well. Return in 3 months for repeat injections.     _____________________________   Fabi Schwartz,   [address], ABPN

## 2022-04-18 RX ORDER — ERENUMAB-AOOE 140 MG/ML
INJECTION, SOLUTION SUBCUTANEOUS
Qty: 3 ML | Refills: 3 | Status: SHIPPED | OUTPATIENT
Start: 2022-04-18

## 2022-05-06 ENCOUNTER — TELEPHONE (OUTPATIENT)
Dept: NEUROLOGY | Age: 62
End: 2022-05-06

## 2022-05-06 NOTE — TELEPHONE ENCOUNTER
Re: Botox    See PA termed, located PA request through [facility], Key# [id number]    Submitted and awaiting update. [id number] NO PA Needed.

## 2022-05-27 ENCOUNTER — DOCUMENTATION ONLY (OUTPATIENT)
Dept: NEUROLOGY | Age: 62
End: 2022-05-27

## 2022-06-16 ENCOUNTER — OFFICE VISIT (OUTPATIENT)
Dept: NEUROLOGY | Age: 62
End: 2022-06-16
Payer: MEDICARE

## 2022-06-16 VITALS
HEIGHT: 59 IN | OXYGEN SATURATION: 99 % | HEART RATE: 86 BPM | DIASTOLIC BLOOD PRESSURE: 74 MMHG | BODY MASS INDEX: 22.18 KG/M2 | WEIGHT: 110 LBS | SYSTOLIC BLOOD PRESSURE: 118 MMHG

## 2022-06-16 DIAGNOSIS — G43.719 INTRACTABLE CHRONIC MIGRAINE WITHOUT AURA AND WITHOUT STATUS MIGRAINOSUS: Primary | ICD-10-CM

## 2022-06-16 PROCEDURE — 64615 CHEMODENERV MUSC MIGRAINE: CPT | Performed by: PSYCHIATRY & NEUROLOGY

## 2022-06-16 NOTE — PROGRESS NOTES
Chief Complaint   Patient presents with    Procedure     Botox     Visit Vitals  /74 (BP 1 Location: Left upper arm, BP Patient Position: Sitting)   Pulse 86   Ht 4' 11\" (1.499 m)   Wt 110 lb (49.9 kg)   SpO2 99%   BMI 22.22 kg/m²

## 2022-06-16 NOTE — PROGRESS NOTES
Botox Injection Note     2022     Patient:  Arabella Thornton   YOB: 1960  Date of Visit: 2022      Indication: patient has chronic migraine headaches greater than 15 days per month lasting more than 4 hours each. She has failed or not tolerated 2 or more medication preventatives. Written consent was signed and verified by me. Risks and benefits were discussed to include possible cosmetic asymmetry which is not permament or life threatening. Patient name and  was confirmed prior to procedure. Time out performed. Procedure:   Botox concentration: 200 units in 2 ml of preservative-free normal saline. 1:1 dilution. LOT#: [id number]  EXP:  2025    Injections and distribution as follow :      Units/site  Sites Loc Subtotal    procerus 5 1 ML 5   corrugaters 2.5 2 BL 5   frontalis 5 8 BL 40   Temporalis 10 4 BL 40   Occipitalis 10 2 BL 20   Cervical paraspinals 5 4 BL 20   Trapezius 10 4 BL 40         200 units Botox were reconstituted, 170 units injected as above and the remainder was unavoidably wasted. Patient tolerated procedure well. Return in 3 months for repeat injections.     _____________________________   Joanie Thompson DO  [address], ABPN

## 2022-06-28 ENCOUNTER — HOSPITAL ENCOUNTER (OUTPATIENT)
Dept: MAMMOGRAPHY | Age: 62
Discharge: HOME | End: 2022-06-28
Payer: MEDICARE

## 2022-06-28 DIAGNOSIS — Z12.31 VISIT FOR SCREENING MAMMOGRAM: ICD-10-CM

## 2022-06-28 PROCEDURE — 77067 SCR MAMMO BI INCL CAD: CPT
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
## 2022-08-11 ENCOUNTER — TELEPHONE (OUTPATIENT)
Dept: NEUROLOGY | Age: 62
End: 2022-08-11

## 2022-08-11 NOTE — TELEPHONE ENCOUNTER
Re: botox    Rcvd fax from [facility] saying they have been unable to  reach pt to fill order. See PA request has termed. Created new PA request in [address] [place] [address], awaiting update. Per review cpt 02564 no PA needed.

## 2022-08-15 ENCOUNTER — TELEPHONE (OUTPATIENT)
Dept: NEUROLOGY | Age: 62
End: 2022-08-15

## 2022-08-30 NOTE — TELEPHONE ENCOUNTER
Re: botox    Follow up on PA and see PA is approved effective 08/11/22-11/11/22 auth# [id number]    Called [facility] to see if pt has reached out bc I rcvd a fax dated 08/26/22 that they have been unable to reach pt for consent. Attempted 3 way call to pt and no answer, left v/m for pt to call [facility].

## 2022-09-01 ENCOUNTER — TELEPHONE (OUTPATIENT)
Dept: NEUROLOGY | Age: 62
End: 2022-09-01

## 2022-09-06 ENCOUNTER — DOCUMENTATION ONLY (OUTPATIENT)
Dept: NEUROLOGY | Age: 62
End: 2022-09-06

## 2022-09-06 NOTE — PROGRESS NOTES
Received 1 box of botox (200 units) from [facility].      LOT: [id number]  EXP: 02/28/2022  MANU: [place]

## 2022-10-06 ENCOUNTER — OFFICE VISIT (OUTPATIENT)
Dept: NEUROLOGY | Age: 62
End: 2022-10-06
Payer: MEDICARE

## 2022-10-06 VITALS
WEIGHT: 114 LBS | SYSTOLIC BLOOD PRESSURE: 136 MMHG | DIASTOLIC BLOOD PRESSURE: 62 MMHG | HEART RATE: 81 BPM | HEIGHT: 59 IN | RESPIRATION RATE: 16 BRPM | BODY MASS INDEX: 22.98 KG/M2 | OXYGEN SATURATION: 97 %

## 2022-10-06 DIAGNOSIS — G43.719 INTRACTABLE CHRONIC MIGRAINE WITHOUT AURA AND WITHOUT STATUS MIGRAINOSUS: Primary | ICD-10-CM

## 2022-10-06 PROCEDURE — 64615 CHEMODENERV MUSC MIGRAINE: CPT | Performed by: PSYCHIATRY & NEUROLOGY

## 2022-10-06 NOTE — PROGRESS NOTES
Chief Complaint   Patient presents with    Procedure     Botox.      Visit Vitals  /62 (BP 1 Location: Left upper arm, BP Patient Position: Sitting)   Pulse 81   Resp 16   Ht 4' 11\" (1.499 m)   Wt 114 lb (51.7 kg)   SpO2 97%   BMI 23.03 kg/m²

## 2022-10-06 NOTE — PROGRESS NOTES
Botox Injection Note     10/6/2022     Patient:  Lisa Carlin   YOB: 1960  Date of Visit: 10/6/2022      Indication: patient has chronic migraine headaches greater than 15 days per month lasting more than 4 hours each. She has failed or not tolerated 2 or more medication preventatives. Written consent was signed and verified by me. Risks and benefits were discussed to include possible cosmetic asymmetry which is not permament or life threatening. Patient name and  was confirmed prior to procedure. Time out performed. Procedure:   Botox concentration: 200 units in 2 ml of preservative-free normal saline. 1:1 dilution. LOT#: [id number]  EXP:  2025    Injections and distribution as follow :      Units/site  Sites Loc Subtotal    procerus 5 1 ML 5   corrugaters 2.5 2 BL 5   frontalis 5 8 BL 40   Temporalis 10 4 BL 40   Occipitalis 10 2 BL 20   Cervical paraspinals 5 4 BL 20   Trapezius 10 4 BL 40         200 units Botox were reconstituted, 170 units injected as above and the remainder was unavoidably wasted. Patient tolerated procedure well. Return in 3 months for repeat injections.     _____________________________   Edith Mckay, DO  [address], ABPN

## 2022-10-10 ENCOUNTER — OFFICE VISIT (OUTPATIENT)
Dept: NEUROLOGY | Age: 62
End: 2022-10-10
Payer: MEDICARE

## 2022-10-10 VITALS
OXYGEN SATURATION: 98 % | HEIGHT: 59 IN | WEIGHT: 114 LBS | DIASTOLIC BLOOD PRESSURE: 84 MMHG | SYSTOLIC BLOOD PRESSURE: 124 MMHG | HEART RATE: 78 BPM | BODY MASS INDEX: 22.98 KG/M2

## 2022-10-10 DIAGNOSIS — Z63.4 GRIEF AT LOSS OF CHILD: ICD-10-CM

## 2022-10-10 DIAGNOSIS — F43.21 GRIEF AT LOSS OF CHILD: ICD-10-CM

## 2022-10-10 DIAGNOSIS — Z87.820 HISTORY OF TRAUMATIC BRAIN INJURY: ICD-10-CM

## 2022-10-10 DIAGNOSIS — G43.719 INTRACTABLE CHRONIC MIGRAINE WITHOUT AURA AND WITHOUT STATUS MIGRAINOSUS: Primary | ICD-10-CM

## 2022-10-10 DIAGNOSIS — F43.0 ACUTE STRESS REACTION: ICD-10-CM

## 2022-10-10 DIAGNOSIS — R00.2 PALPITATIONS: ICD-10-CM

## 2022-10-10 PROCEDURE — 99215 OFFICE O/P EST HI 40 MIN: CPT | Performed by: PSYCHIATRY & NEUROLOGY

## 2022-10-10 PROCEDURE — G8427 DOCREV CUR MEDS BY ELIG CLIN: HCPCS | Performed by: PSYCHIATRY & NEUROLOGY

## 2022-10-10 PROCEDURE — 3017F COLORECTAL CA SCREEN DOC REV: CPT | Performed by: PSYCHIATRY & NEUROLOGY

## 2022-10-10 PROCEDURE — G8420 CALC BMI NORM PARAMETERS: HCPCS | Performed by: PSYCHIATRY & NEUROLOGY

## 2022-10-10 PROCEDURE — G9717 DOC PT DX DEP/BP F/U NT REQ: HCPCS | Performed by: PSYCHIATRY & NEUROLOGY

## 2022-10-10 PROCEDURE — G9899 SCRN MAM PERF RSLTS DOC: HCPCS | Performed by: PSYCHIATRY & NEUROLOGY

## 2022-10-10 RX ORDER — MIRTAZAPINE 30 MG/1
30 TABLET, FILM COATED ORAL
Qty: 30 TABLET | Refills: 5 | Status: SHIPPED | OUTPATIENT
Start: 2022-10-10

## 2022-10-10 RX ORDER — KETOCONAZOLE 20 MG/G
CREAM TOPICAL
COMMUNITY
Start: 2022-09-26

## 2022-10-10 SDOH — SOCIAL STABILITY - SOCIAL INSECURITY: DISSAPEARANCE AND DEATH OF FAMILY MEMBER: Z63.4

## 2022-11-14 ENCOUNTER — TELEPHONE (OUTPATIENT)
Dept: NEUROLOGY | Age: 62
End: 2022-11-14

## 2022-11-23 NOTE — TELEPHONE ENCOUNTER
Re: botox    Rcvd PA request, see PA termed 11/11/22, submitted PA request in [facility] and rcvd approval effective 11/23/22-2/23/23. Auth# [id number]    Sent update to nurse.

## 2022-11-28 ENCOUNTER — TELEPHONE (OUTPATIENT)
Dept: NEUROLOGY | Age: 62
End: 2022-11-28

## 2022-12-14 ENCOUNTER — TELEPHONE (OUTPATIENT)
Dept: NEUROLOGY | Age: 62
End: 2022-12-14

## 2022-12-14 RX ORDER — ONABOTULINUMTOXINA 200 [USP'U]/1
INJECTION, POWDER, LYOPHILIZED, FOR SOLUTION INTRADERMAL; INTRAMUSCULAR
Qty: 1 EACH | Refills: 3 | Status: SHIPPED | OUTPATIENT
Start: 2022-12-14

## 2022-12-14 NOTE — TELEPHONE ENCOUNTER
I am not sure if I should approve this if we cannot continue this. Will I be able to give her an injection next month?

## 2022-12-19 ENCOUNTER — TELEPHONE (OUTPATIENT)
Dept: NEUROLOGY | Age: 62
End: 2022-12-19

## 2022-12-21 ENCOUNTER — DOCUMENTATION ONLY (OUTPATIENT)
Dept: NEUROLOGY | Age: 62
End: 2022-12-21

## 2023-01-12 ENCOUNTER — OFFICE VISIT (OUTPATIENT)
Dept: NEUROLOGY | Age: 63
End: 2023-01-12
Payer: MEDICARE

## 2023-01-12 VITALS
OXYGEN SATURATION: 99 % | BODY MASS INDEX: 23.79 KG/M2 | WEIGHT: 118 LBS | HEIGHT: 59 IN | DIASTOLIC BLOOD PRESSURE: 82 MMHG | RESPIRATION RATE: 16 BRPM | SYSTOLIC BLOOD PRESSURE: 130 MMHG | HEART RATE: 72 BPM

## 2023-01-12 DIAGNOSIS — G43.719 INTRACTABLE CHRONIC MIGRAINE WITHOUT AURA AND WITHOUT STATUS MIGRAINOSUS: Primary | ICD-10-CM

## 2023-01-12 PROCEDURE — 64615 CHEMODENERV MUSC MIGRAINE: CPT | Performed by: PSYCHIATRY & NEUROLOGY

## 2023-01-12 RX ORDER — LITHIUM CARBONATE 300 MG/1
TABLET, FILM COATED, EXTENDED RELEASE ORAL
COMMUNITY
Start: 2022-12-05

## 2023-01-12 RX ORDER — CLONAZEPAM 0.5 MG/1
TABLET ORAL
COMMUNITY
Start: 2023-01-03

## 2023-01-12 RX ORDER — BALOXAVIR MARBOXIL 40 MG/1
TABLET, FILM COATED ORAL
COMMUNITY
Start: 2022-11-08

## 2023-01-12 RX ORDER — PIMECROLIMUS 10 MG/G
CREAM TOPICAL
COMMUNITY
Start: 2023-01-09

## 2023-01-12 RX ORDER — ESCITALOPRAM OXALATE 10 MG/1
TABLET ORAL
COMMUNITY
Start: 2022-12-05

## 2023-01-12 NOTE — PROGRESS NOTES
Chief Complaint   Patient presents with    Procedure     Botox     Visit Vitals  /82 (BP 1 Location: Left upper arm, BP Patient Position: Sitting)   Pulse 72   Resp 16   Ht 4' 11\" (1.499 m)   Wt 118 lb (53.5 kg)   SpO2 99%   BMI 23.83 kg/m²

## 2023-01-12 NOTE — PROGRESS NOTES
Botox Injection Note     2023     Patient:  Papo Sweet   YOB: 1960  Date of Visit: 2023      Indication: patient has chronic migraine headaches greater than 15 days per month lasting more than 4 hours each. She has failed or not tolerated 2 or more medication preventatives. Written consent was signed and verified by me. Risks and benefits were discussed to include possible cosmetic asymmetry which is not permament or life threatening. Patient name and  was confirmed prior to procedure. Time out performed. Procedure:   Botox concentration: 200 units in 2 ml of preservative-free normal saline. 1:1 dilution. LOT#: [id number]  EXP:  2025    Injections and distribution as follow :      Units/site  Sites Loc Subtotal    procerus 5 1 ML 5   corrugaters 2.5 2 BL 5   frontalis 5 8 BL 40   Temporalis 10 4 BL 40   Occipitalis 10 2 BL 20   Cervical paraspinals 5 4 BL 20   Trapezius 10 4 BL 40         200 units Botox were reconstituted, 170 units injected as above and the remainder was unavoidably wasted. Patient tolerated procedure well. Return in 3 months for repeat injections.     _____________________________   Sridhar Range, DO  [address], ABPN

## 2023-02-02 ENCOUNTER — TELEPHONE (OUTPATIENT)
Dept: NEUROLOGY | Age: 63
End: 2023-02-02

## 2023-02-02 NOTE — TELEPHONE ENCOUNTER
Re: Botox    Rcvd message that pt is continuing botox with NP, sent message to have script sent to [facility].

## 2023-02-20 ENCOUNTER — TELEPHONE (OUTPATIENT)
Dept: NEUROLOGY | Age: 63
End: 2023-02-20

## 2023-02-21 ENCOUNTER — TELEPHONE (OUTPATIENT)
Dept: NEUROLOGY | Age: 63
End: 2023-02-21

## 2023-02-22 ENCOUNTER — TELEPHONE (OUTPATIENT)
Dept: NEUROLOGY | Age: 63
End: 2023-02-22

## 2023-02-22 NOTE — TELEPHONE ENCOUNTER
Called the Pt. Had to leave a voice mail trying to find out if the Pt.  Is wanting to see Dr. Satnam Duong for Botox or Zak Delatorre NP.

## 2023-02-23 ENCOUNTER — TELEPHONE (OUTPATIENT)
Dept: NEUROLOGY | Age: 63
End: 2023-02-23

## 2023-02-28 ENCOUNTER — TELEPHONE (OUTPATIENT)
Dept: NEUROLOGY | Age: 63
End: 2023-02-28

## 2023-02-28 NOTE — TELEPHONE ENCOUNTER
Pt returning nurse call in reference to scheduling Botox appt with Dr Adriana Bustillos. Please contact pt.

## 2023-04-04 ENCOUNTER — OFFICE VISIT (OUTPATIENT)
Dept: NEUROLOGY | Age: 63
End: 2023-04-04
Payer: MEDICARE

## 2023-04-04 PROCEDURE — 64615 CHEMODENERV MUSC MIGRAINE: CPT | Performed by: NURSE PRACTITIONER

## 2023-04-04 RX ORDER — ERENUMAB-AOOE 140 MG/ML
INJECTION, SOLUTION SUBCUTANEOUS
Qty: 3 ML | Refills: 3 | Status: SHIPPED
Start: 2023-04-04

## 2023-04-04 RX ORDER — ESZOPICLONE 3 MG/1
3 TABLET, FILM COATED ORAL
Qty: 90 TABLET | Refills: 1 | Status: SHIPPED
Start: 2023-04-04

## 2023-04-04 RX ORDER — TOPIRAMATE 100 MG/1
100 TABLET, FILM COATED ORAL DAILY
Qty: 90 TABLET | Refills: 3 | Status: SHIPPED
Start: 2023-04-04

## 2023-04-04 NOTE — PROGRESS NOTES
[facility]  OFFICE PROCEDURE PROGRESS NOTE        Chart reviewed for the following:   I, [de-identified] M VILMA Ngo, have reviewed the History, Physical and updated the Allergic reactions for [place] performed immediately prior to start of procedure:   I, [de-identified] M VILMA Ngo, have performed the following reviews on Kathy Craven prior to the start of the procedure:            * Patient was identified by name and date of birth   * Agreement on procedure being performed was verified  * Risks and Benefits explained to the patient  * Procedure site verified and marked as necessary  * Patient was positioned for comfort  * Consent was signed and verified     Time: 1000      Date of procedure: 4/4/2023    Procedure performed by:  Glen Moeller NP    Provider assisted by: None    Patient assisted by: None    How tolerated by patient: tolerated the procedure well with no complications    Post Procedural Pain Scale: 2 - Hurts Little Bit    Comments: None      Botox Injection Note       Indication: patient has chronic recurrent migraine, has 7-10 less migraine days per month with botox injections    Procedure:   Botox concentration: 200 units in 4 ml of preservative-free normal saline. 31 sites injections, distribution as follow      Units/site  Sites Sides Subtotal    Procerus 5 1 1 5    5 1 2 10   Frontalis 5 2 2 20   Temporalis 5 4 2 40   Occipitalis 5 3 2 30   Upper cervical paraspinalis 5 2 2 20   Trapezius 5 3 2 30         200 units Botox were reconstituted, 155 units injected as above and the remainder was unavoidably wasted.      Patient tolerated procedure well.     _____________________________   Leah Hill

## 2023-04-17 ENCOUNTER — TELEPHONE (OUTPATIENT)
Dept: NEUROLOGY | Age: 63
End: 2023-04-17

## 2023-06-21 ENCOUNTER — TELEPHONE (OUTPATIENT)
Age: 63
End: 2023-06-21

## 2023-07-07 ENCOUNTER — HOSPITAL ENCOUNTER (OUTPATIENT)
Facility: HOSPITAL | Age: 63
Discharge: HOME | End: 2023-07-07
Payer: COMMERCIAL

## 2023-07-07 DIAGNOSIS — Z12.31 VISIT FOR SCREENING MAMMOGRAM: ICD-10-CM

## 2023-07-07 PROCEDURE — 77067 SCR MAMMO BI INCL CAD: CPT

## 2023-07-13 ENCOUNTER — OFFICE VISIT (OUTPATIENT)
Age: 63
End: 2023-07-13

## 2023-07-13 VITALS — DIASTOLIC BLOOD PRESSURE: 90 MMHG | SYSTOLIC BLOOD PRESSURE: 132 MMHG

## 2023-07-13 DIAGNOSIS — G43.719 CHRONIC MIGRAINE WITHOUT AURA, INTRACTABLE, WITHOUT STATUS MIGRAINOSUS: ICD-10-CM

## 2023-07-13 DIAGNOSIS — G47.00 INSOMNIA, UNSPECIFIED TYPE: Primary | ICD-10-CM

## 2023-07-13 DIAGNOSIS — M79.18 MYOFASCIAL PAIN: ICD-10-CM

## 2023-07-13 RX ORDER — ESZOPICLONE 3 MG/1
3 TABLET, FILM COATED ORAL NIGHTLY PRN
Qty: 30 TABLET | Refills: 3 | Status: SHIPPED | OUTPATIENT
Start: 2023-07-13 | End: 2023-10-11

## 2023-07-13 RX ORDER — BACLOFEN 10 MG/1
10 TABLET ORAL 3 TIMES DAILY
Qty: 90 TABLET | Refills: 5 | Status: SHIPPED | OUTPATIENT
Start: 2023-07-13

## 2023-07-13 RX ORDER — METHYLPREDNISOLONE 4 MG/1
TABLET ORAL
COMMUNITY
Start: 2023-07-11

## 2023-07-14 NOTE — PROGRESS NOTES
Would like to discuss sleep aid  MHA have been poor, needs refills on PRN meds  Monthly has 4+ migraines
normal.    Impression  IMPRESSION: No acute intracranial hemorrhage, mass or infarct. EEG Result:      Carotid Doppler:        Recent Labs:  No results found for: WBC, HGB, HCT, MCV, PLT  No results found for: NA, K, CL, CO2, BUN, CREATININE, GLUCOSE, CALCIUM, PROT, LABALBU, BILITOT, ALKPHOS, AST, ALT, LABGLOM, GFRAA, AGRATIO, GLOB    No results found for: CHOL  No results found for: TRIG  No results found for: HDL  No results found for: LDLCHOLESTEROL, LDLCALC  No results found for: LABVLDL, VLDL  No results found for: CHOLHDLRATIO    No results found for: SEDRATE    No results found for: LABA1C  No results found for: MARK             1. Insomnia, unspecified type  -     eszopiclone 3 MG TABS; Take 1 tablet by mouth nightly as needed (insomnia) for up to 90 days. Max Daily Amount: 3 mg, Disp-30 tablet, R-3Normal  2. Chronic migraine without aura, intractable, without status migrainosus  3. Myofascial pain       BOTOX neck week   Keep Aimovig for now   Lunesta 3 mg for sleep QHS   Baclofen for PRN- spasms, neck, headaches.            This note will not be viewable in MyChart

## 2023-07-18 ENCOUNTER — PROCEDURE VISIT (OUTPATIENT)
Age: 63
End: 2023-07-18
Payer: MEDICARE

## 2023-07-18 ENCOUNTER — TELEPHONE (OUTPATIENT)
Age: 63
End: 2023-07-18

## 2023-07-18 DIAGNOSIS — M54.16 LUMBAR RADICULOPATHY: ICD-10-CM

## 2023-07-18 DIAGNOSIS — G43.719 CHRONIC MIGRAINE WITHOUT AURA, INTRACTABLE, WITHOUT STATUS MIGRAINOSUS: Primary | ICD-10-CM

## 2023-07-18 DIAGNOSIS — R20.2 PARESTHESIA OF BILATERAL LEGS: ICD-10-CM

## 2023-07-18 DIAGNOSIS — R29.898 WEAKNESS OF BOTH LEGS: ICD-10-CM

## 2023-07-18 PROCEDURE — 64615 CHEMODENERV MUSC MIGRAINE: CPT | Performed by: NURSE PRACTITIONER

## 2023-07-18 NOTE — TELEPHONE ENCOUNTER
RE:Eszopiclone  Key: [id number]      Rcvd notification via CMM that medication was denied         Eszopiclone is denied because it is not on your plan's Drug List (formulary). Medication  authorization requires the following:  (1) You need to try five (5) of these covered drugs:  (a) Belsomra. (b) Chantal Bath. (c) Durenda Alter. (d) Ramelteon or brand Rozerem. (e) Trazodone. (2) OR your doctor needs to give us specific medical reasons why five (5) of the covered  drug(s) are not appropriate for you.     Nurse notified

## 2023-07-18 NOTE — PROGRESS NOTES
OFFICE PROCEDURE PROGRESS NOTE        Chart reviewed for the following:   Kike KAPOOR APRN - NP, have reviewed the History, Physical and updated the Allergic reactions for [address] performed immediately prior to start of procedure:   Jamin KAPOOR APRN - NP, have performed the following reviews on Lord Cosme prior to the start of the procedure:            * Patient was identified by name and date of birth   * Agreement on procedure being performed was verified  * Risks and Benefits explained to the patient  * Procedure site verified and marked as necessary  * Patient was positioned for comfort  * Consent was signed and verified     Time: 0940      Date of procedure: 7/18/2023    Procedure performed by:  JUAN Salguero NP    Provider assisted by: None    Patient assisted by: None    How tolerated by patient: tolerated the procedure well with no complications    Post Procedural Pain Scale: 2 - Hurts Little Bit    Comments: None    TSM:[id number]  EXP: 10/2025          Botox Injection Note       Indication: patient has chronic recurrent migraine, has 7-10 less migraine days per month with botox injections    Procedure:   Botox concentration: 200 units in 4 ml of preservative-free normal saline. 31 sites injections, distribution as follow      Units/site  Sites Sides Subtotal    Procerus 5 1 1 5    5 1 2 10   Frontalis 5 2 2 20   Temporalis 5 4 2 40   Occipitalis 5 3 2 30   Upper cervical paraspinalis 5 2 2 20   Trapezius 5 3 2 30         200 units Botox were reconstituted, 155 units injected as above and the remainder was unavoidably wasted.      Patient tolerated procedure well.     _____________________________   Pratima Brazil

## 2023-07-21 NOTE — TELEPHONE ENCOUNTER
Botox  overturned on appeal.     Approval 9/25/19 - 12/25/19 for 200 units every 3 months. Ref  [id number]. Botox [id number] no PA required. [facility]  [phone]. Bb      Forward to Ange Donovan to set up shipment and to notify patient to expect call from their specialty pharmacy.
Great, thanks!
Satisfactory
English

## 2023-07-30 ENCOUNTER — HOSPITAL ENCOUNTER (OUTPATIENT)
Facility: HOSPITAL | Age: 63
Discharge: HOME | End: 2023-08-02
Payer: MEDICARE

## 2023-07-30 DIAGNOSIS — R20.2 PARESTHESIA OF BILATERAL LEGS: ICD-10-CM

## 2023-07-30 DIAGNOSIS — R29.898 WEAKNESS OF BOTH LEGS: ICD-10-CM

## 2023-07-30 DIAGNOSIS — M54.16 LUMBAR RADICULOPATHY: ICD-10-CM

## 2023-07-30 PROCEDURE — A9579 GAD-BASE MR CONTRAST NOS,1ML: HCPCS | Performed by: NURSE PRACTITIONER

## 2023-07-30 PROCEDURE — 72158 MRI LUMBAR SPINE W/O & W/DYE: CPT

## 2023-07-30 PROCEDURE — 6360000004 HC RX CONTRAST MEDICATION: Performed by: NURSE PRACTITIONER

## 2023-07-30 RX ADMIN — GADOTERIDOL 10 ML: 279.3 INJECTION, SOLUTION INTRAVENOUS at 08:53

## 2023-10-09 ENCOUNTER — CLINICAL DOCUMENTATION (OUTPATIENT)
Age: 63
End: 2023-10-09

## 2023-10-09 NOTE — PROGRESS NOTES
BOTOX DEL[facility]ED FROM [facility]  [phone]  RX: [id number]  ( 0 ) REFILLS REMAIN  Lot #[id number]  EXP:03/2026

## 2023-10-25 ENCOUNTER — TELEPHONE (OUTPATIENT)
Age: 63
End: 2023-10-25

## 2023-10-25 NOTE — TELEPHONE ENCOUNTER
Patient needs to reschedule Botox appt (11.02.23)  [place] [address], waiting for part. Can you squeeze her in sometime on Nov. 15th , 16th (afer 11:00) or 17th? If not, can you squeeze her in anytime, any day the following week Nov. 20-24?     Pls call Gaetano Phoenix  [phone]

## 2023-11-16 ENCOUNTER — PROCEDURE VISIT (OUTPATIENT)
Age: 63
End: 2023-11-16

## 2023-11-16 DIAGNOSIS — G43.719 CHRONIC MIGRAINE WITHOUT AURA, INTRACTABLE, WITHOUT STATUS MIGRAINOSUS: Primary | ICD-10-CM

## 2023-11-17 ENCOUNTER — HOSPITAL ENCOUNTER (OUTPATIENT)
Facility: HOSPITAL | Age: 63
End: 2023-11-17
Payer: MEDICARE

## 2023-11-17 ENCOUNTER — PROCEDURE VISIT (OUTPATIENT)
Age: 63
End: 2023-11-17
Payer: MEDICARE

## 2023-11-17 DIAGNOSIS — M79.601 RIGHT ARM PAIN: ICD-10-CM

## 2023-11-17 DIAGNOSIS — M79.601 PAIN IN BOTH UPPER EXTREMITIES: ICD-10-CM

## 2023-11-17 DIAGNOSIS — M79.601 RIGHT ARM PAIN: Primary | ICD-10-CM

## 2023-11-17 DIAGNOSIS — G43.719 CHRONIC MIGRAINE WITHOUT AURA, INTRACTABLE, WITHOUT STATUS MIGRAINOSUS: ICD-10-CM

## 2023-11-17 DIAGNOSIS — M79.602 PAIN IN BOTH UPPER EXTREMITIES: ICD-10-CM

## 2023-11-17 PROCEDURE — G8428 CUR MEDS NOT DOCUMENT: HCPCS | Performed by: NURSE PRACTITIONER

## 2023-11-17 PROCEDURE — G8484 FLU IMMUNIZE NO ADMIN: HCPCS | Performed by: NURSE PRACTITIONER

## 2023-11-17 PROCEDURE — 3017F COLORECTAL CA SCREEN DOC REV: CPT | Performed by: NURSE PRACTITIONER

## 2023-11-17 PROCEDURE — 73060 X-RAY EXAM OF HUMERUS: CPT

## 2023-11-17 PROCEDURE — 64615 CHEMODENERV MUSC MIGRAINE: CPT | Performed by: NURSE PRACTITIONER

## 2023-11-17 PROCEDURE — 99214 OFFICE O/P EST MOD 30 MIN: CPT | Performed by: NURSE PRACTITIONER

## 2023-11-17 PROCEDURE — G8420 CALC BMI NORM PARAMETERS: HCPCS | Performed by: NURSE PRACTITIONER

## 2023-11-17 PROCEDURE — 1036F TOBACCO NON-USER: CPT | Performed by: NURSE PRACTITIONER

## 2023-11-21 NOTE — PROGRESS NOTES
OFFICE PROCEDURE PROGRESS NOTE        Chart reviewed for the following:   Kike KAPOOR APRN - NP, have reviewed the History, Physical and updated the Allergic reactions for [address] performed immediately prior to start of procedure:   Gege KAPOOR APRN - NP, have performed the following reviews on Brittany Jolly prior to the start of the procedure:            * Patient was identified by name and date of birth   * Agreement on procedure being performed was verified  * Risks and Benefits explained to the patient  * Procedure site verified and marked as necessary  * Patient was positioned for comfort  * Consent was signed and verified     Time: 1200      Date of procedure: 11/16/2023    Procedure performed by:  JUAN Horne NP    Provider assisted by: None    Patient assisted by: None    How tolerated by patient: tolerated the procedure well with no complications    Post Procedural Pain Scale: 2 - Hurts Little Bit    Comments: None          Botox Injection Note       Indication: patient has chronic recurrent migraine, has 7-10 less migraine days per month with botox injections    Procedure:   Botox concentration: 200 units in 4 ml of preservative-free normal saline. 31 sites injections, distribution as follow      Units/site  Sites Sides Subtotal    Procerus 5 1 1 5    5 1 2 10   Frontalis 5 2 2 20   Temporalis 5 4 2 40   Occipitalis 5 3 2 30   Upper cervical paraspinalis 5 2 2 20   Trapezius 5 3 2 30         200 units Botox were reconstituted, 155 units injected as above and the remainder was unavoidably wasted.      Patient tolerated procedure well.     _____________________________   Meli Olivia
standardized protocol  tailored for this examination and automatic exposure control for dose  modulation. Direct comparison is made to prior CT dated March 2017. FINDINGS: There are embolization coils in the expected location of the cavernous  portion of the right internal carotid artery. There is no acute intracranial  hemorrhage, mass, mass effect or herniation. Ventricular system is normal. The  gray-white matter differentiation is well-preserved. The mastoid air cells are  well pneumatized. The visualized paranasal sinuses are normal.    Impression  IMPRESSION: No acute intracranial hemorrhage, mass or infarct. EEG Result:      Carotid Doppler:        Recent Labs:  No results found for: \"WBC\", \"HGB\", \"HCT\", \"MCV\", \"PLT\"  No results found for: \"NA\", \"K\", \"CL\", \"CO2\", \"BUN\", \"CREATININE\", \"GLUCOSE\", \"CALCIUM\", \"PROT\", \"LABALBU\", \"BILITOT\", \"ALKPHOS\", \"AST\", \"ALT\", \"LABGLOM\", \"GFRAA\", \"AGRATIO\", \"GLOB\"    No results found for: \"CHOL\"  No results found for: \"TRIG\"  No results found for: \"HDL\"  No results found for: \"LDLCHOLESTEROL\", \"LDLCALC\"  No results found for: \"LABVLDL\", \"VLDL\"  No results found for: \"CHOLHDLRATIO\"    No results found for: \"SEDRATE\"    No results found for: \"LABA1C\"  No results found for: \"MARK\"             1. Right arm pain  -     XR HUMERUS RIGHT (MIN 2 VIEWS); Future  2. Pain in both upper extremities  -     EMG LIMITED; Future  3. Chronic migraine without aura, intractable, without status migrainosus  -     CHEMODERVATE FACIAL/TRIGEM/CERV MUSC MIGRAINE     EMG BOTH ARMS to look at radiculopathy vs. CTS  Will get a XR RIGHT arm to look at the bicep mass closer. Can consider US if needed but will start here.                This note will not be viewable in [facility]

## 2024-01-05 ENCOUNTER — PROCEDURE VISIT (OUTPATIENT)
Age: 64
End: 2024-01-05

## 2024-01-05 DIAGNOSIS — R20.2 PARESTHESIA: Primary | ICD-10-CM

## 2024-01-05 NOTE — PROGRESS NOTES
EMG/ NCS Report  [facility] [address], [address]  [place]  [address]   Ph: [phone]   FAX: [phone]/ [phone]  Test Date:  2019      Test Date:  2024    Patient: DAWSON CARTER : 1960 Physician: Josias Rubi MD   Sex: Female Height: ' \" Ref Phys: ALENA GRIMES   ID#: [id number] Weight:  lbs. Technician: Carmita Trevino     Patient History / Exam:  CC: SALVADOR. pain in both shoulders, R worse than L, symptoms x 1 yr.    Patient is coming for neuropathy evaluation.      EMG & NCV Findings:  Evaluation of the left median motor and the right median motor nerves showed normal distal onset latency (L3.7, R4.3 ms), normal amplitude (L8.8, R7.3 mV), and normal conduction velocity (Elbow-Wrist, L55, R55 m/s).  The left ulnar motor and the right ulnar motor nerves showed normal distal onset latency (L2.4, R2.4 ms), normal amplitude (L8.0, R8.2 mV), normal conduction velocity (B Elbow-Wrist, L61, R63 m/s), and normal conduction velocity (A Elbow-B Elbow, L56, R50 m/s).  The left median sensory, the right median sensory, the left radial sensory, the right radial sensory, the left ulnar sensory, and the right ulnar sensory nerves showed normal distal peak latency (L3.6, R4.0, L1.8, R1.9, L3.2, R2.9 ms) and normal amplitude (L44.6, R17.4, L66.9, R59.8, L36.6, R17.0 µV).  The left median/ulnar (palm) comparison nerve showed normal distal onset latency (Median Palm, 1.6 ms), normal distal peak latency (Median Palm, 2.1 ms), normal amplitude (Median Palm, 23.0 µV), normal distal onset latency (Ulnar Palm, 1.4 ms), normal distal peak latency (Ulnar Palm, 1.7 ms), and normal amplitude (Ulnar Palm, 10.1 µV).  The right median/ulnar (palm) comparison nerve showed normal distal onset latency (Median Palm, 1.8 ms), prolonged distal peak latency (Median Palm, 2.5 ms), normal amplitude (Median Palm, 15.3 µV), normal distal onset latency (Ulnar

## 2024-01-30 RX ORDER — TOPIRAMATE 100 MG/1
100 TABLET, FILM COATED ORAL DAILY
Qty: 90 TABLET | Refills: 3 | Status: SHIPPED | OUTPATIENT
Start: 2024-01-30

## 2024-01-30 RX ORDER — ERENUMAB-AOOE 140 MG/ML
INJECTION, SOLUTION SUBCUTANEOUS
Qty: 3 ML | Refills: 3 | Status: SHIPPED | OUTPATIENT
Start: 2024-01-30

## 2024-02-08 ENCOUNTER — PROCEDURE VISIT (OUTPATIENT)
Age: 64
End: 2024-02-08

## 2024-02-08 DIAGNOSIS — G43.709 CHRONIC MIGRAINE WITHOUT AURA WITHOUT STATUS MIGRAINOSUS, NOT INTRACTABLE: Primary | ICD-10-CM

## 2024-02-08 NOTE — PROGRESS NOTES
OFFICE PROCEDURE PROGRESS NOTE        Chart reviewed for the following:   Alena KAPOOR APRN - NP, have reviewed the History, Physical and updated the Allergic reactions for Kenzie Torres     TIME OUT performed immediately prior to start of procedure:   Alena KAPOOR APRN - NP, have performed the following reviews on Kenzie Torres prior to the start of the procedure:            * Patient was identified by name and date of birth   * Agreement on procedure being performed was verified  * Risks and Benefits explained to the patient  * Procedure site verified and marked as necessary  * Patient was positioned for comfort  * Consent was signed and verified     Time: 1400      Date of procedure: 2/8/2024    Procedure performed by:  JUAN Baez NP    Provider assisted by: None    Patient assisted by: None    How tolerated by patient: tolerated the procedure well with no complications    Post Procedural Pain Scale: 2 - Hurts Little Bit    Comments: None    LOT:[id number]  EXP: 03/2026          Botox Injection Note       Indication: patient has chronic recurrent migraine, has 7-10 less migraine days per month with botox injections    Procedure:   Botox concentration: 200 units in 4 ml of preservative-free normal saline.   31 sites injections, distribution as follow      Units/site  Sites Sides Subtotal    Procerus 5 1 1 5    5 1 2 10   Frontalis 5 2 2 20   Temporalis 5 4 2 40   Occipitalis 5 3 2 30   Upper cervical paraspinalis 5 2 2 20   Trapezius 5 3 2 30         200 units Botox were reconstituted, 155 units injected as above and the remainder was unavoidably wasted.     Patient tolerated procedure well.     _____________________________   ALENA PERAZA     Comfortable with where things are  She does have to get a 1 level lumbar fusion done by Dr. Chavez he can well soon  She is going to be trying to go to [place] for a reggae festival and is excited for this

## 2024-03-01 ENCOUNTER — TELEPHONE (OUTPATIENT)
Age: 64
End: 2024-03-01

## 2024-03-01 NOTE — TELEPHONE ENCOUNTER
Drug Acquisition: BUY AND BILL  Drug: BOTOX 200 units Dx: G43.709  Insurance: [facility]  Submission Type: [facility] Portal  [facility]:   Reference #: [id number]   Approval Range: 03/01/24 to 03/01/25

## 2024-04-03 NOTE — PROGRESS NOTES
NEURO PROGRESS NOTE     Chief Complaint: headache    Overnight heart rated was bradycardic but patient was asymptomatic. Headache was minimal until partook in physical therapy. States the pain 10/10 and throbbing with photophobia and a sense of not feeling right. No weakness or sensory loss. Can't say if her headache is worse on standing.  is with her at bed side. Assesment and Plan    1. Acute intractable headache, unspecified headache type  Continue medrol  May need another scan if her headache does not improve and her distress does not resolve    2. Right internal carotid artery aneurysm  Will be addressed outpatient  Currently asymptomatic    3. Dysthymia  conitnue  paxil    4. Hypothyroidism  Continue levothyroxine.        Allergies  Percocet [oxycodone-acetaminophen]     Medications  Current Facility-Administered Medications   Medication Dose Route Frequency    HYDROmorphone (PF) (DILAUDID) injection 1 mg  1 mg IntraVENous Q6H PRN    morphine injection 2 mg  2 mg IntraVENous Q3H PRN    FLUoxetine (PROzac) capsule 20 mg  20 mg Oral DAILY    cyclobenzaprine (FLEXERIL) tablet 5 mg  5 mg Oral TID    methylPREDNISolone (MEDROL) tablet 4 mg  4 mg Oral TID WITH MEALS    Followed by   Joe Peter methylPREDNISolone (MEDROL) tablet 8 mg  8 mg Oral ONCE    Followed by   Sayra Abbasi ON 3/14/2017] methylPREDNISolone (MEDROL) tablet 4 mg  4 mg Oral QID WITH MEALS    Followed by   Sayra Abbasi ON 3/15/2017] methylPREDNISolone (MEDROL) tablet 4 mg  4 mg Oral TID WITH MEALS    Followed by   Sayra Abbasi ON 3/16/2017] methylPREDNISolone (MEDROL) tablet 4 mg  4 mg Oral ACB/HS    Followed by   Sayra Abbasi ON 3/17/2017] methylPREDNISolone (MEDROL) tablet 4 mg  4 mg Oral ACB    sodium chloride (NS) flush 5-10 mL  5-10 mL IntraVENous Q8H    sodium chloride (NS) flush 5-10 mL  5-10 mL IntraVENous PRN    0.9% sodium chloride infusion  125 mL/hr IntraVENous CONTINUOUS    ondansetron (ZOFRAN) injection 4 mg  4 mg IntraVENous Q4H PRN    Subjective:     Patient ID: Jhoana Call is a 43 y.o. female  PCP: Jean Carlos Alford MD  Referring Provider: No ref. provider found    HPI  Patient presents to the office today with the following complaints: Follow-up      Pt seen today for follow up after MRI abdomen MRCP.  Chronic pancreatitis noted on imaging.  She was prescribed Creon at that time for abdominal cramping, diarrhea.    She just started this on Monday.  She continues to have abdominal/back pain, 5 days a week, pain will last all day.  She continues to have diarrhea post prandial.  Denies any nausea or vomiting in the last 2 days.  She is requesting increase on dose of reflux medication.  She continues to have breakthrough heartburn with Protonix 40 mg daily.          Last EGD 3/5/2024 - gastritis, no h pylori  Last Colonoscopy 3/5/2024 - Moderate-large amount of retained thick liquid and semi-solid stool throughout the colon, able to reach cecum, 2 yr recall   Family hx colon polyps - Mother    3/19/2024  Pt seen today for follow up, abdominal pain.  EGD and Colonoscopy completed on 3/5/2024.  Pt was seen in ER on 3/16/2024.  GI panel negative.  She c/o watery diarrhea, up to 30-40 times a day.  She c/o gurgling, cramping in abdomen.  She c/o thoracic back pain, severe at times.  She was recently in [place] for work, she was seen in ER there as well.  Denies any recent ETOH use.  She will use Imodium prn.  Post prandial diarrhea, after liquids.  Nocturnal diarrhea.   This is affecting her life    2/28/2024  Pt seen today for follow up after recent hospitalization.  Pt presented to ED on 2/22/2024 with c/o nausea and vomiting.  Pt reports \"food poisoning\" in November.  Ever since then she continues to have nausea, vomiting, diarrhea.  \"I am either constipated or having diarrhea.\"  She will go 2-3 days wihtout a BM.  She will use Magnesium pill if needed.  Stools are watery.  Denies any blood in stool.  Nausea is constant, vomiting daily.  She is  bisacodyl (DULCOLAX) tablet 5 mg  5 mg Oral DAILY PRN    levothyroxine (SYNTHROID) tablet 25 mcg  25 mcg Oral ACB    hydrALAZINE (APRESOLINE) 20 mg/mL injection 10 mg  10 mg IntraVENous Q6H PRN    acetaminophen (TYLENOL) tablet 650 mg  650 mg Oral Q6H PRN        Medical History  Past Medical History:   Diagnosis Date    Spinal stenosis     Thyroid disease     hypothyroid     Review of Systems   Constitutional: Negative for chills and fever. HENT: Positive for tinnitus. Negative for ear pain. Eyes: Positive for blurred vision and pain. Negative for discharge. Respiratory: Negative for cough and hemoptysis. Cardiovascular: Negative for chest pain and claudication. Gastrointestinal: Negative for constipation and diarrhea. Genitourinary: Negative for flank pain and hematuria. Musculoskeletal: Negative for back pain and myalgias. Skin: Negative for itching and rash. Neurological: Positive for dizziness and headaches. Endo/Heme/Allergies: Negative for environmental allergies. Does not bruise/bleed easily. Psychiatric/Behavioral: Positive for depression. Negative for hallucinations, substance abuse and suicidal ideas      Exam:    Visit Vitals    /88 (BP 1 Location: Right arm, BP Patient Position: At rest)    Pulse (!) 45    Temp 97.2 °F (36.2 °C)    Resp 16    Wt 122 lb 5.7 oz (55.5 kg)    LMP 04/22/2013    SpO2 98%    BMI 24.71 kg/m2      Physical Exam   Constitutional: She is oriented to person, place, and time. She appears distressed. HENT:   Head: Normocephalic and atraumatic. Eyes: Conjunctivae and EOM are normal. Pupils are equal, round, and reactive to light. Neck: Neck supple. No JVD present. Carotid bruit is not present. No thyromegaly present. Cardiovascular: Normal rate, regular rhythm and normal heart sounds. Pulmonary/Chest: Effort normal and breath sounds normal. No respiratory distress. She has no wheezes. She has no rales. Abdominal: Soft.  Bowel sounds are normal. She exhibits no distension. There is no tenderness. Musculoskeletal: Normal range of motion. Neurological: She is alert and oriented to person, place, and time. She has normal strength, normal reflexes and intact cranial nerves. No cranial nerve deficit. She exhibits normal muscle tone. Coordination normal.   Reflex Scores:       Tricep reflexes are 2+ on the right side and 2+ on the left side. Bicep reflexes are 2+ on the right side and 2+ on the left side. Brachioradialis reflexes are 2+ on the right side. Patellar reflexes are 2+ on the right side and 2+ on the left side. Achilles reflexes are 2+ on the right side and 2+ on the left side. Skin: No rash noted. No erythema. Nursing note and vitals reviewed. Imaging    CT Results (most recent):    Results from Hospital Encounter encounter on 03/08/17   CTA HEAD NECK W CONT    IMPRESSION:  5 x 4 mm right cavernous internal carotid artery aneurysm. No significant flow-limiting stenosis involving cervical carotid arteries. Patent vertebrobasilar system. No major large vessel intracranial vascular occlusive change. Recommend consult interventional neuroradiology.         Lab Review    Lab Results   Component Value Date/Time    WBC 6.3 03/12/2017 01:11 AM    HCT 34.7 03/12/2017 01:11 AM    HGB 11.0 03/12/2017 01:11 AM    PLATELET 835 26/07/4140 01:11 AM       Lab Results   Component Value Date/Time    Sodium 145 03/12/2017 01:11 AM    Potassium 4.1 03/12/2017 01:11 AM    Chloride 108 03/12/2017 01:11 AM    CO2 27 03/12/2017 01:11 AM    Glucose 194 03/12/2017 01:11 AM    BUN 9 03/12/2017 01:11 AM    Creatinine 0.74 03/12/2017 01:11 AM    Calcium 7.2 03/12/2017 01:11 AM

## 2024-05-09 ENCOUNTER — OFFICE VISIT (OUTPATIENT)
Age: 64
End: 2024-05-09
Payer: MEDICARE

## 2024-05-09 DIAGNOSIS — G43.709 CHRONIC MIGRAINE WITHOUT AURA WITHOUT STATUS MIGRAINOSUS, NOT INTRACTABLE: Primary | ICD-10-CM

## 2024-05-09 PROCEDURE — 64615 CHEMODENERV MUSC MIGRAINE: CPT | Performed by: NURSE PRACTITIONER

## 2024-05-09 NOTE — PROGRESS NOTES
OFFICE PROCEDURE PROGRESS NOTE        Chart reviewed for the following:   Alean KAPOOR APRN - NP, have reviewed the History, Physical and updated the Allergic reactions for Kenzie Torres     TIME OUT performed immediately prior to start of procedure:   Alena KAPOOR APRN - NP, have performed the following reviews on Kenzie Torres prior to the start of the procedure:            * Patient was identified by name and date of birth   * Agreement on procedure being performed was verified  * Risks and Benefits explained to the patient  * Procedure site verified and marked as necessary  * Patient was positioned for comfort  * Consent was signed and verified     Time: 1000      Date of procedure: 5/9/2024    Procedure performed by:  JUAN Baez NP    Provider assisted by: None    Patient assisted by: None    How tolerated by patient: tolerated the procedure well with no complications    Post Procedural Pain Scale: 2 - Hurts Little Bit    Comments: None          Botox Injection Note       Indication: patient has chronic recurrent migraine, has 7-10 less migraine days per month with botox injections    Procedure:   Botox concentration: 200 units in 4 ml of preservative-free normal saline.   31 sites injections, distribution as follow      Units/site  Sites Sides Subtotal    Procerus 5 1 1 5    5 1 2 10   Frontalis 5 2 2 20   Temporalis 5 4 2 40   Occipitalis 5 3 2 30   Upper cervical paraspinalis 5 2 2 20   Trapezius 5 3 2 30         200 units Botox were reconstituted, 155 units injected as above and the remainder was unavoidably wasted.     Patient tolerated procedure well.     _____________________________   ALENA PERAZA

## 2024-08-02 ENCOUNTER — OFFICE VISIT (OUTPATIENT)
Age: 64
End: 2024-08-02

## 2024-08-02 DIAGNOSIS — G43.719 CHRONIC MIGRAINE WITHOUT AURA, INTRACTABLE, WITHOUT STATUS MIGRAINOSUS: Primary | ICD-10-CM

## 2024-08-02 NOTE — PROGRESS NOTES
OFFICE PROCEDURE PROGRESS NOTE        Chart reviewed for the following:   Alena KAPOOR APRN - NP, have reviewed the History, Physical and updated the Allergic reactions for Kenzie Torres     TIME OUT performed immediately prior to start of procedure:   Alena KAPOOR APRN - NP, have performed the following reviews on Kenzie Torres prior to the start of the procedure:            * Patient was identified by name and date of birth   * Agreement on procedure being performed was verified  * Risks and Benefits explained to the patient  * Procedure site verified and marked as necessary  * Patient was positioned for comfort  * Consent was signed and verified     Time: 1100      Date of procedure: 8/2/2024    Procedure performed by:  JUAN Baez NP    Provider assisted by: None    Patient assisted by: None    How tolerated by patient: tolerated the procedure well with no complications    Post Procedural Pain Scale: 2 - Hurts Little Bit    Comments: None            Botox Injection Note       Indication: patient has chronic recurrent migraine, has 7-10 less migraine days per month with botox injections    Procedure:   Botox concentration: 200 units in 4 ml of preservative-free normal saline.   31 sites injections, distribution as follow      Units/site  Sites Sides Subtotal    Procerus 5 1 1 5    5 1 2 10   Frontalis 5 2 2 20   Temporalis 5 4 2 40   Occipitalis 5 3 2 30   Upper cervical paraspinalis 5 2 2 20   Trapezius 5 3 2 30         200 units Botox were reconstituted, 155 units injected as above and the remainder was unavoidably wasted.     Patient tolerated procedure well.     _____________________________   ALENA PERAZA

## 2024-08-07 ENCOUNTER — HOSPITAL ENCOUNTER (OUTPATIENT)
Facility: HOSPITAL | Age: 64
Discharge: HOME | End: 2024-08-10
Payer: MEDICARE

## 2024-08-07 VITALS — HEIGHT: 59 IN | BODY MASS INDEX: 23.79 KG/M2 | WEIGHT: 118 LBS

## 2024-08-07 DIAGNOSIS — Z12.31 VISIT FOR SCREENING MAMMOGRAM: ICD-10-CM

## 2024-08-07 PROCEDURE — 77063 BREAST TOMOSYNTHESIS BI: CPT

## 2024-10-09 ENCOUNTER — TELEPHONE (OUTPATIENT)
Age: 64
End: 2024-10-09

## 2024-10-09 NOTE — TELEPHONE ENCOUNTER
Spoke with patient, notified her that botox appointment has been changed to 11/1/2024 arrival of 10:40 am due to Kike being out on 11/7/2024. Patient states she will be going out of town that day, asking for a call to discuss.

## 2024-10-16 RX ORDER — UBROGEPANT 100 MG/1
TABLET ORAL
Qty: 10 TABLET | Refills: 4 | Status: SHIPPED | OUTPATIENT
Start: 2024-10-16

## 2024-10-21 NOTE — PROGRESS NOTES
Cardiology consult was called to Dr. Jin Perez office and spoke to one of the NP Patient transported to X-ray     Gabo [place] RN  10/21/24 [id number]

## 2024-10-31 ENCOUNTER — OFFICE VISIT (OUTPATIENT)
Age: 64
End: 2024-10-31

## 2024-10-31 DIAGNOSIS — G43.719 CHRONIC MIGRAINE WITHOUT AURA, INTRACTABLE, WITHOUT STATUS MIGRAINOSUS: Primary | ICD-10-CM

## 2024-11-01 NOTE — PROGRESS NOTES
OFFICE PROCEDURE PROGRESS NOTE        Chart reviewed for the following:   Alena KAPOOR APRN - NP, have reviewed the History, Physical and updated the Allergic reactions for Kenzie Torres     TIME OUT performed immediately prior to start of procedure:   Alena KAPOOR APRN - NP, have performed the following reviews on Kenzie Torres prior to the start of the procedure:            * Patient was identified by name and date of birth   * Agreement on procedure being performed was verified  * Risks and Benefits explained to the patient  * Procedure site verified and marked as necessary  * Patient was positioned for comfort  * Consent was signed and verified     Time: 1100      Date of procedure: 10/31/2024    Procedure performed by:  JUAN Baez NP    Provider assisted by: None    Patient assisted by: None    How tolerated by patient: tolerated the procedure well with no complications    Post Procedural Pain Scale: 2 - Hurts Little Bit    Comments: None          Botox Injection Note       Indication: patient has chronic recurrent migraine, has 7-10 less migraine days per month with botox injections    Procedure:   Botox concentration: 200 units in 4 ml of preservative-free normal saline.   31 sites injections, distribution as follow      Units/site  Sites Sides Subtotal    Procerus 5 1 1 5    5 1 2 10   Frontalis 5 2 2 20   Temporalis 5 4 2 40   Occipitalis 5 3 2 30   Upper cervical paraspinalis 5 2 2 20   Trapezius 5 3 2 30         200 units Botox were reconstituted, 155 units injected as above and the remainder was unavoidably wasted.     Patient tolerated procedure well.     _____________________________   ALENA PERAZA

## 2024-12-10 RX ORDER — TOPIRAMATE 100 MG/1
100 TABLET, FILM COATED ORAL DAILY
Qty: 90 TABLET | Refills: 3 | Status: SHIPPED | OUTPATIENT
Start: 2024-12-10

## 2025-01-02 DIAGNOSIS — G43.719 CHRONIC MIGRAINE WITHOUT AURA, INTRACTABLE, WITHOUT STATUS MIGRAINOSUS: Primary | ICD-10-CM

## 2025-01-02 RX ORDER — UBROGEPANT 100 MG/1
TABLET ORAL
Qty: 10 TABLET | Refills: 4 | Status: SHIPPED | OUTPATIENT
Start: 2025-01-02

## 2025-01-16 ENCOUNTER — OFFICE VISIT (OUTPATIENT)
Age: 65
End: 2025-01-16
Payer: MEDICARE

## 2025-01-16 DIAGNOSIS — G43.719 CHRONIC MIGRAINE WITHOUT AURA, INTRACTABLE, WITHOUT STATUS MIGRAINOSUS: Primary | ICD-10-CM

## 2025-01-16 PROCEDURE — 64615 CHEMODENERV MUSC MIGRAINE: CPT | Performed by: NURSE PRACTITIONER

## 2025-01-16 NOTE — PROGRESS NOTES
OFFICE PROCEDURE PROGRESS NOTE        Chart reviewed for the following:   Alena KAPOOR APRN - NP, have reviewed the History, Physical and updated the Allergic reactions for Kenzie Torres     TIME OUT performed immediately prior to start of procedure:   Alena KAPOOR APRN - NP, have performed the following reviews on Kenzie Torres prior to the start of the procedure:            * Patient was identified by name and date of birth   * Agreement on procedure being performed was verified  * Risks and Benefits explained to the patient  * Procedure site verified and marked as necessary  * Patient was positioned for comfort  * Consent was signed and verified     Time: 1400      Date of procedure: 1/16/2025    Procedure performed by:  JUAN Baez NP    Provider assisted by: None    Patient assisted by: None    How tolerated by patient: tolerated the procedure well with no complications    Post Procedural Pain Scale: 2 - Hurts Little Bit    Comments: None    Botox Injection Note       Indication: patient has chronic recurrent migraine, has 7-10 less migraine days per month with botox injections    Procedure:   Botox concentration: 200 units in 4 ml of preservative-free normal saline.   31 sites injections, distribution as follow      Units/site  Sites Sides Subtotal    Procerus 5 1 1 5    5 1 2 10   Frontalis 5 2 2 20   Temporalis 5 4 2 40   Occipitalis 5 3 2 30   Upper cervical paraspinalis 5 2 2 20   Trapezius 5 3 2 30         200 units Botox were reconstituted, 155 units injected as above and the remainder was unavoidably wasted.     Patient tolerated procedure well.     _____________________________   ALENA PERAZA

## 2025-04-15 ENCOUNTER — TELEPHONE (OUTPATIENT)
Age: 65
End: 2025-04-15

## 2025-04-15 NOTE — TELEPHONE ENCOUNTER
Botox Drug Acquisition: buy and bill  POS [address]   Dx: G43.709  Insurance: [facility]  Submission Type: [facility] Portal  [facility]:   CPT: 93395 ( no auth required per call ref# [id number])  Reference #: [id number]  Approval Range: [place] Approved 04/15/25-04/15/26 (4 visits)

## 2025-04-25 ENCOUNTER — OFFICE VISIT (OUTPATIENT)
Age: 65
End: 2025-04-25

## 2025-04-25 DIAGNOSIS — G43.709 CHRONIC MIGRAINE WITHOUT AURA WITHOUT STATUS MIGRAINOSUS, NOT INTRACTABLE: Primary | ICD-10-CM

## 2025-04-25 NOTE — PROGRESS NOTES
OFFICE PROCEDURE PROGRESS NOTE      No chief complaint on file.            Chart reviewed for the following:   Alena KAPOOR APRN - NP, have reviewed the History, Physical and updated the Allergic reactions for Kenzie Torres     TIME OUT performed immediately prior to start of procedure:   Alena KAPOOR APRN - NP, have performed the following reviews on Kenzie Torres prior to the start of the procedure:            * Patient was identified by name and date of birth   * Agreement on procedure being performed was verified  * Risks and Benefits explained to the patient  * Procedure site verified and marked as necessary  * Patient was positioned for comfort  * Consent was signed and verified     Time: 1100      Date of procedure: 4/25/2025    Procedure performed by:  JUAN Baez NP    Provider assisted by: None    Patient assisted by: None    How tolerated by patient: tolerated the procedure well with no complications    Post Procedural Pain Scale: 2 - Hurts Little Bit    Comments: None      Botox Injection Note       Indication: patient has chronic recurrent migraine, has 7-10 less migraine days per month with botox injections    Procedure:   Botox concentration: 200 units in 4 ml of preservative-free normal saline.   31 sites injections, distribution as follow      Units/site  Sites Sides Subtotal    Procerus 5 1 1 5    5 1 2 10   Frontalis 5 2 2 20   Temporalis 5 4 2 40   Occipitalis 5 3 2 30   Upper cervical paraspinalis 5 2 2 20   Trapezius 5 3 2 30         200 units Botox were reconstituted, 155 units injected as above and the remainder was unavoidably wasted.     Patient tolerated procedure well.     _____________________________   ALENA PERAZA

## 2025-04-28 NOTE — ED PROVIDER NOTES
HPI Comments: 49 y.o. female with past medical history significant for hypothyroidism and spinal stenosis who presents from home accompanied by her  with chief complaint of headache. Pt reports with worsening headache, neck pain, chills, dizziness, and nausea. Pt woke up this morning with a brief episode of blurry vision and right arm numbness that resolved on its own. 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 Pt was referred to the ED by a woman named Angelique Oro from Dr. Jamar Morley office. Pt denies vomiting and fever. There are no other acute medical concerns at this time. Social hx: ; denies tobacco use; uses EtOH socially; denies illicit drug use; mountain bike instructor; works at [employer]  PCP: Guilherme Freed MD    Note written by Wallace Celis, as dictated by Adelaida Acevedo MD 3:08 PM      The history is provided by the patient and the spouse. Past Medical History:   Diagnosis Date    Spinal stenosis     Thyroid disease     hypothyroid       Past Surgical History:   Procedure Laterality Date    HX ORTHOPAEDIC  2002    ACL repair, pt unsure which side    HX ORTHOPAEDIC  2013    left shoulder surgery    TN COLONOSCOPY FLX DX W/COLLJ SPEC WHEN PFRMD  5/6/2013              History reviewed. No pertinent family history. Social History     Social History    Marital status:      Spouse name: N/A    Number of children: N/A    Years of education: N/A     Occupational History    Not on file.      Social History Main Topics    Smoking status: Never Smoker    Smokeless tobacco: Not on file    Alcohol use Yes      Comment: socially    Drug use: Not on file    Sexual activity: Not on file     Other Topics Concern    Not on file     Social History Narrative         ALLERGIES: Percocet [oxycodone-acetaminophen]    Review of Systems   Constitutional: Positive Refill request sent to provider    for chills. Negative for activity change, appetite change, fatigue and fever. HENT: Negative for ear pain, facial swelling, sore throat and trouble swallowing. Eyes: Negative for pain, discharge and visual disturbance. Respiratory: Negative for chest tightness, shortness of breath and wheezing. Cardiovascular: Negative for chest pain and palpitations. Gastrointestinal: Positive for nausea. Negative for abdominal pain, blood in stool and vomiting. Genitourinary: Negative for difficulty urinating, flank pain and hematuria. Musculoskeletal: Positive for neck pain. Negative for arthralgias, joint swelling and myalgias. Skin: Negative for color change and rash. Neurological: Positive for dizziness and headaches. Negative for weakness and numbness. Hematological: Negative for adenopathy. Does not bruise/bleed easily. Psychiatric/Behavioral: Negative for behavioral problems, confusion and sleep disturbance. All other systems reviewed and are negative. Vitals:    03/09/17 1445 03/09/17 1500 03/09/17 1515 03/09/17 1530   BP: 111/65 124/70 130/64    Pulse: 71 72 65 74   Resp: 18 14 17 21   Temp:       SpO2: 93% 96% 97% 94%   Weight:       Height:                Physical Exam   Constitutional: She is oriented to person, place, and time. She appears well-developed and well-nourished. She appears ill. No distress. Vital signs normal.   HENT:   Head: Normocephalic and atraumatic. Nose: Nose normal.   Mouth/Throat: Oropharynx is clear and moist.   With headache. No sinus tenderness. Eyes: Conjunctivae and EOM are normal. Pupils are equal, round, and reactive to light. No scleral icterus. Neck: Normal range of motion. Neck supple. No JVD present. No tracheal deviation present. No thyromegaly present. No carotid bruits noted. Neck uncomfortable with movement. Cardiovascular: Normal rate, regular rhythm, normal heart sounds and intact distal pulses.   Exam reveals no gallop and no friction rub.    No murmur heard. Pulmonary/Chest: Effort normal and breath sounds normal. No respiratory distress. She has no wheezes. She has no rales. She exhibits no tenderness. Abdominal: Soft. Bowel sounds are normal. She exhibits no distension and no mass. There is no tenderness. There is no rebound and no guarding. Musculoskeletal: Normal range of motion. She exhibits no edema or tenderness. Lymphadenopathy:     She has no cervical adenopathy. Neurological: She is alert and oriented to person, place, and time. She has normal reflexes. No cranial nerve deficit. Coordination normal.   Straight leg raise negative. Skin: Skin is warm and dry. No rash noted. No erythema. Psychiatric: She has a normal mood and affect. Her behavior is normal. Judgment and thought content normal.   Nursing note and vitals reviewed. Note written by Candance Salinas, Scribe, as dictated by Mikayla Cheng MD 3:08 PM  MDM  Number of Diagnoses or Management Options  Headache, unspecified headache type: established and worsening     Amount and/or Complexity of Data Reviewed  Clinical lab tests: ordered and reviewed  Decide to obtain previous medical records or to obtain history from someone other than the patient: yes  Review and summarize past medical records: yes  Discuss the patient with other providers: yes    Risk of Complications, Morbidity, and/or Mortality  Presenting problems: high  Diagnostic procedures: high  Management options: high    Patient Progress  Patient progress: stable    ED Course       Procedures     Radiologist is not concerned with the carotid findings with the patient's current presentationl. CONSULT NOTE:  3:36 PM Mikayla Cheng MD spoke with Santos Garcia for IR. Discussed available diagnostic tests and clinical findings. She is in agreement with care plans as outlined. [facility] [id number] states they are not overly concerned about the findings yesterday.     CONSULT NOTE:  3:51 PM Mikayla Cheng MD spoke with Dr. Gautam Fuentes, Consult for Neurology. Discussed available diagnostic tests and clinical findings. She is in agreement with care plans as outlined. Dr. Gautam Fuentes recommends treating the patient for a migraine headache. The patient was treated in the ED. No symptoms suggestive of post tap headache. No fever. Supple neck. No neurological deficits. No tachycardia. Patient improved with symptomatic treatment and was discharged with  Continued conservative treatment to be followed by own MD. Return if symptoms worsen.

## 2025-05-13 ENCOUNTER — PATIENT MESSAGE (OUTPATIENT)
Age: 65
End: 2025-05-13

## 2025-05-13 RX ORDER — ERENUMAB-AOOE 140 MG/ML
140 INJECTION, SOLUTION SUBCUTANEOUS
Qty: 3 ML | Refills: 3 | Status: ACTIVE | OUTPATIENT
Start: 2025-05-13

## 2025-07-08 ENCOUNTER — OFFICE VISIT (OUTPATIENT)
Age: 65
End: 2025-07-08
Payer: MEDICARE

## 2025-07-08 VITALS
RESPIRATION RATE: 18 BRPM | DIASTOLIC BLOOD PRESSURE: 72 MMHG | TEMPERATURE: 97.7 F | SYSTOLIC BLOOD PRESSURE: 122 MMHG | HEART RATE: 72 BPM | OXYGEN SATURATION: 99 %

## 2025-07-08 DIAGNOSIS — G89.29 CHRONIC BILATERAL LOW BACK PAIN WITHOUT SCIATICA: Primary | ICD-10-CM

## 2025-07-08 DIAGNOSIS — M54.50 CHRONIC BILATERAL LOW BACK PAIN WITHOUT SCIATICA: Primary | ICD-10-CM

## 2025-07-08 DIAGNOSIS — G43.719 CHRONIC MIGRAINE WITHOUT AURA, INTRACTABLE, WITHOUT STATUS MIGRAINOSUS: ICD-10-CM

## 2025-07-08 PROCEDURE — 99215 OFFICE O/P EST HI 40 MIN: CPT | Performed by: NURSE PRACTITIONER

## 2025-07-08 PROCEDURE — G8420 CALC BMI NORM PARAMETERS: HCPCS | Performed by: NURSE PRACTITIONER

## 2025-07-08 PROCEDURE — 3017F COLORECTAL CA SCREEN DOC REV: CPT | Performed by: NURSE PRACTITIONER

## 2025-07-08 PROCEDURE — G8427 DOCREV CUR MEDS BY ELIG CLIN: HCPCS | Performed by: NURSE PRACTITIONER

## 2025-07-08 PROCEDURE — 1036F TOBACCO NON-USER: CPT | Performed by: NURSE PRACTITIONER

## 2025-07-08 RX ORDER — METHOCARBAMOL 750 MG/1
TABLET, FILM COATED ORAL
Qty: 90 TABLET | Refills: 5 | Status: SHIPPED | OUTPATIENT
Start: 2025-07-08

## 2025-07-08 RX ORDER — UBROGEPANT 100 MG/1
TABLET ORAL
Qty: 10 TABLET | Refills: 11 | Status: ACTIVE | OUTPATIENT
Start: 2025-07-08

## 2025-07-08 NOTE — PROGRESS NOTES
Kenzie Torres is a 64 y.o. female who presents with the following  Chief Complaint   Patient presents with    Migraine     Patient has had  3-4  migraines since her botox. She states botox is working well.        HPI    Comes in for follow-up for Botox  She has been only having about 3-4 migraines a month since starting Botox  Before Botox she was having about 25 to 30 days a month  Lasting an hour     She has seen a significant improvement with these    She does state they are continue to be unilateral in the back of the head  She has hypersensitivity to lights and sounds and smells  She does use the Ubrelvy as needed and likely needs a new PA  This works quick and well  She is also on Topamax    She is staying more active by riding her bike  She is going to [place] again this weekend to do this and is excited    She is having some low back pain as she is being more active so she is interested in talking about this more    Allergies   Allergen Reactions    Codeine      Other reaction(s): Unknown (comments)       Current Outpatient Medications   Medication Sig Dispense Refill    Ubrogepant (UBRELVY) 100 MG TABS 1 at HA onset and repeat in 2 hours if needed.  Max 2 in 24 hours 10 tablet 11    methocarbamol (ROBAXIN) 750 MG tablet 1 tablet by mouth TID 90 tablet 5    Erenumab-aooe (AIMOVIG) 140 MG/ML SOAJ Inject 140 mg into the skin every 30 days 3 mL 3    topiramate (TOPAMAX) 100 MG tablet TAKE 1 TABLET BY MOUTH DAILY 90 tablet 3    Onabotulinumtoxin A (BOTOX) 200 units injection INJECT 155 UNITS  INTRAMUSCULARLY EVERY 12  WEEKS (DISCARD UNUSED AFTER FIRST USE) 1 each 4    levothyroxine (SYNTHROID) 75 MCG tablet Take 1 tab daily--MUST ESTABLISH WITH NEW ENDOCRINOLOGIST OR CONTACT PCP FOR FURTHER REFILLS      meloxicam (MOBIC) 15 MG tablet ceived the following from [facility]: Outside name: meloxicam (MOBIC) 15 mg tablet      zolpidem (AMBIEN) 10 MG tablet TAKE 1 TABLET BY MOUTH AT

## 2025-07-24 ENCOUNTER — OFFICE VISIT (OUTPATIENT)
Age: 65
End: 2025-07-24
Payer: MEDICARE

## 2025-07-24 DIAGNOSIS — G43.719 CHRONIC MIGRAINE WITHOUT AURA, INTRACTABLE, WITHOUT STATUS MIGRAINOSUS: Primary | ICD-10-CM

## 2025-07-24 PROCEDURE — 64615 CHEMODENERV MUSC MIGRAINE: CPT | Performed by: NURSE PRACTITIONER

## 2025-07-24 NOTE — PROGRESS NOTES
OFFICE PROCEDURE PROGRESS NOTE            Chart reviewed for the following:   Alena KAPOOR DNP, have reviewed the History, Physical and updated the Allergic reactions for Kenzie Torres     TIME OUT performed immediately prior to start of procedure:   Alena KAPOOR DNP, have performed the following reviews on Kenzie Torres prior to the start of the procedure:            * Patient was identified by name and date of birth   * Agreement on procedure being performed was verified  * Risks and Benefits explained to the patient  * Procedure site verified and marked as necessary  * Patient was positioned for comfort  * Consent was signed and verified     Time: 1400      Date of procedure: 7/24/2025    Procedure performed by:  Alena Padgett DNP    Provider assisted by: None    Patient assisted by: None    How tolerated by patient: tolerated the procedure well with no complications    Post Procedural Pain Scale: 2 - Hurts Little Bit    Comments: None        Botox Injection Note       Indication: patient has chronic recurrent migraine, has 7-10 less migraine days per month with botox injections    Procedure:   Botox concentration: 200 units in 4 ml of preservative-free normal saline.   31 sites injections, distribution as follow      Units/site  Sites Sides Subtotal    Procerus 5 1 1 5    5 1 2 10   Frontalis 5 2 2 20   Temporalis 5 4 2 40   Occipitalis 5 3 2 30   Upper cervical paraspinalis 5 2 2 20   Trapezius 5 3 2 30         200 units Botox were reconstituted, 155 units injected as above and the remainder was unavoidably wasted.     Patient tolerated procedure well.     _____________________________   ALENA PERAZA